# Patient Record
Sex: MALE | Race: WHITE | NOT HISPANIC OR LATINO | Employment: OTHER | URBAN - METROPOLITAN AREA
[De-identification: names, ages, dates, MRNs, and addresses within clinical notes are randomized per-mention and may not be internally consistent; named-entity substitution may affect disease eponyms.]

---

## 2019-05-17 DIAGNOSIS — Z00.00 HEALTH CARE MAINTENANCE: Primary | ICD-10-CM

## 2019-05-18 LAB
ALBUMIN SERPL-MCNC: 3.8 G/DL (ref 3.5–5.2)
ALBUMIN/GLOB SERPL: 1.6 G/DL
ALP SERPL-CCNC: 58 U/L (ref 39–117)
ALT SERPL-CCNC: 15 U/L (ref 1–41)
AST SERPL-CCNC: 17 U/L (ref 1–40)
BILIRUB SERPL-MCNC: 0.9 MG/DL (ref 0.2–1.2)
BUN SERPL-MCNC: 30 MG/DL (ref 8–23)
BUN/CREAT SERPL: 20 (ref 7–25)
CALCIUM SERPL-MCNC: 10.3 MG/DL (ref 8.6–10.5)
CHLORIDE SERPL-SCNC: 102 MMOL/L (ref 98–107)
CHOLEST SERPL-MCNC: 109 MG/DL (ref 0–200)
CHOLEST/HDLC SERPL: 2.95 {RATIO}
CO2 SERPL-SCNC: 27.8 MMOL/L (ref 22–29)
CREAT SERPL-MCNC: 1.5 MG/DL (ref 0.76–1.27)
GLOBULIN SER CALC-MCNC: 2.4 GM/DL
GLUCOSE SERPL-MCNC: 104 MG/DL (ref 65–99)
HDLC SERPL-MCNC: 37 MG/DL (ref 40–60)
LDLC SERPL CALC-MCNC: 53 MG/DL (ref 0–100)
POTASSIUM SERPL-SCNC: 4.4 MMOL/L (ref 3.5–5.2)
PROT SERPL-MCNC: 6.2 G/DL (ref 6–8.5)
SODIUM SERPL-SCNC: 139 MMOL/L (ref 136–145)
TRIGL SERPL-MCNC: 95 MG/DL (ref 0–150)
VLDLC SERPL CALC-MCNC: 19 MG/DL

## 2019-05-20 ENCOUNTER — OFFICE VISIT (OUTPATIENT)
Dept: INTERNAL MEDICINE | Facility: CLINIC | Age: 79
End: 2019-05-20

## 2019-05-20 VITALS
BODY MASS INDEX: 28.89 KG/M2 | HEIGHT: 70 IN | DIASTOLIC BLOOD PRESSURE: 72 MMHG | TEMPERATURE: 97.9 F | SYSTOLIC BLOOD PRESSURE: 112 MMHG | WEIGHT: 201.8 LBS | RESPIRATION RATE: 20 BRPM

## 2019-05-20 DIAGNOSIS — N28.9 RENAL INSUFFICIENCY: ICD-10-CM

## 2019-05-20 DIAGNOSIS — E34.9 INCREASED PTH LEVEL: ICD-10-CM

## 2019-05-20 DIAGNOSIS — E78.5 HYPERLIPIDEMIA, UNSPECIFIED HYPERLIPIDEMIA TYPE: ICD-10-CM

## 2019-05-20 DIAGNOSIS — G47.33 OSA ON CPAP: ICD-10-CM

## 2019-05-20 DIAGNOSIS — Z99.89 OSA ON CPAP: ICD-10-CM

## 2019-05-20 DIAGNOSIS — I10 ESSENTIAL HYPERTENSION: ICD-10-CM

## 2019-05-20 DIAGNOSIS — K21.9 GASTROESOPHAGEAL REFLUX DISEASE, ESOPHAGITIS PRESENCE NOT SPECIFIED: ICD-10-CM

## 2019-05-20 DIAGNOSIS — I25.10 CORONARY ARTERY DISEASE INVOLVING NATIVE CORONARY ARTERY OF NATIVE HEART WITHOUT ANGINA PECTORIS: Primary | ICD-10-CM

## 2019-05-20 PROBLEM — R79.89 INCREASED PTH LEVEL: Status: ACTIVE | Noted: 2019-05-20

## 2019-05-20 PROBLEM — I21.9 MYOCARDIAL INFARCTION (HCC): Status: ACTIVE | Noted: 2019-05-20

## 2019-05-20 PROCEDURE — 99214 OFFICE O/P EST MOD 30 MIN: CPT | Performed by: INTERNAL MEDICINE

## 2019-05-20 RX ORDER — OMEPRAZOLE 20 MG/1
20 CAPSULE, DELAYED RELEASE ORAL DAILY
COMMUNITY
Start: 2015-12-16 | End: 2021-01-29 | Stop reason: HOSPADM

## 2019-05-20 RX ORDER — ATENOLOL 25 MG/1
25 TABLET ORAL DAILY
COMMUNITY
Start: 2015-12-16 | End: 2020-06-09 | Stop reason: SDUPTHER

## 2019-05-20 RX ORDER — RANOLAZINE 1000 MG/1
1000 TABLET, EXTENDED RELEASE ORAL 2 TIMES DAILY
Refills: 0 | COMMUNITY
Start: 2019-03-30 | End: 2019-05-30 | Stop reason: SDUPTHER

## 2019-05-20 RX ORDER — ATORVASTATIN CALCIUM 80 MG/1
80 TABLET, FILM COATED ORAL DAILY
COMMUNITY
Start: 2019-05-17 | End: 2020-02-17

## 2019-05-20 RX ORDER — NITROGLYCERIN 0.4 MG/1
0.4 TABLET SUBLINGUAL AS NEEDED
Status: ON HOLD | COMMUNITY
Start: 2015-12-16 | End: 2021-05-12 | Stop reason: SDUPTHER

## 2019-05-20 RX ORDER — LOSARTAN POTASSIUM 50 MG/1
50 TABLET ORAL DAILY
COMMUNITY
End: 2020-06-09 | Stop reason: SDUPTHER

## 2019-05-20 RX ORDER — CHOLECALCIFEROL (VITAMIN D3) 125 MCG
2000 CAPSULE ORAL DAILY
COMMUNITY

## 2019-05-20 RX ORDER — CHLORAL HYDRATE 500 MG
CAPSULE ORAL
Status: ON HOLD | COMMUNITY
End: 2021-01-28

## 2019-05-20 RX ORDER — FUROSEMIDE 20 MG/1
20 TABLET ORAL DAILY
COMMUNITY
Start: 2015-12-16 | End: 2019-09-03 | Stop reason: SDUPTHER

## 2019-05-20 RX ORDER — ASPIRIN 81 MG/1
81 TABLET ORAL DAILY
COMMUNITY
Start: 2015-12-16

## 2019-05-20 RX ORDER — ISOSORBIDE MONONITRATE 30 MG/1
30 TABLET, EXTENDED RELEASE ORAL DAILY
COMMUNITY
Start: 2016-08-30 | End: 2019-08-26 | Stop reason: SDUPTHER

## 2019-05-20 NOTE — PROGRESS NOTES
Subjective        Chief Complaint   Patient presents with   • Follow-up     fup labs med refills   • Hypertension   • Hyperlipidemia       PHQ-2 Depression Screening  Little interest or pleasure in doing things? 0   Feeling down, depressed, or hopeless? 0   PHQ-2 Total Score 0         Kenn Brito is a 78 y.o. male who presents for    Patient Active Problem List   Diagnosis   • CAD (coronary artery disease)   • GERD (gastroesophageal reflux disease)   • Hx of CABG   • Hyperlipidemia   • Hypertension   • Renal insufficiency   • Increased PTH level   • EVELINA on CPAP       History of Present Illness     He was working on his camper and he twisted his left back some three days ago. He denies reflux, chest pain, dyspnea, nausea, abdominal pain, melena or hematochezia. He has not been checking his BP. He sees derm once per year. He started a CPAP machine this year; he is not snoring as much.  No Known Allergies    Current Outpatient Medications on File Prior to Visit   Medication Sig Dispense Refill   • aspirin 81 MG EC tablet Take 81 mg by mouth Daily.     • atenolol (TENORMIN) 25 MG tablet Take 25 mg by mouth Daily.     • atorvastatin (LIPITOR) 80 MG tablet Take 80 mg by mouth Daily.     • Cholecalciferol (VITAMIN D3) 2000 units tablet Take  by mouth.     • furosemide (LASIX) 20 MG tablet Take 20 mg by mouth Daily.     • isosorbide mononitrate (IMDUR) 30 MG 24 hr tablet Take 30 mg by mouth Daily.     • losartan (COZAAR) 50 MG tablet Take 50 mg by mouth Daily.     • Multiple Vitamin (MULTI-VITAMIN DAILY PO) Take  by mouth.     • nitroglycerin (NITROSTAT) 0.4 MG SL tablet Place 0.4 mg under the tongue As Needed.     • Omega-3 Fatty Acids (FISH OIL) 1000 MG capsule capsule Take  by mouth Daily With Breakfast.     • omeprazole (priLOSEC) 20 MG capsule Take 20 mg by mouth Daily.     • ranolazine (RANEXA) 1000 MG 12 hr tablet Take 1,000 mg by mouth 2 (Two) Times a Day.  0     No current facility-administered medications on  file prior to visit.        Past Medical History:   Diagnosis Date   • CAD (coronary artery disease)     stent in    • GERD (gastroesophageal reflux disease)    • Herpes zoster    • Hypercalcemia    • Hyperlipidemia    • Hypertension    • Increased PTH level    • Renal cyst    • Renal insufficiency    • Sciatica    • Tubular adenoma of colon 2014    X3       Past Surgical History:   Procedure Laterality Date   • CARDIAC CATHETERIZATION  2016   • COLONOSCOPY  10/29/2014   • CORONARY ARTERY BYPASS GRAFT         Family History   Problem Relation Age of Onset   • Throat cancer Father    • Peripheral vascular disease Brother    • Diabetes Brother    • Heart disease Brother    • Crohn's disease Daughter        Social History     Socioeconomic History   • Marital status:      Spouse name: Not on file   • Number of children: Not on file   • Years of education: Not on file   • Highest education level: Not on file   Tobacco Use   • Smoking status: Former Smoker     Packs/day: 0.50     Years: 20.00     Pack years: 10.00     Types: Cigarettes     Last attempt to quit: 1988     Years since quittin.4   • Smokeless tobacco: Never Used   Substance and Sexual Activity   • Alcohol use: Yes     Frequency: Monthly or less   • Drug use: No   • Sexual activity: Defer           The following portions of the patient's history were reviewed and updated as appropriate: problem list, allergies, current medications, past medical history, past family history, past social history and past surgical history.    Review of Systems   Respiratory: Negative for shortness of breath.    Cardiovascular: Negative for chest pain.       Immunization History   Administered Date(s) Administered   • Flu Vaccine High Dose PF 65YR+ 2018   • Pneumococcal Conjugate 13-Valent (PCV13) 2018   • Pneumococcal Polysaccharide (PPSV23) 2019   • Tdap 2018       Objective   Vitals:    19 1414   BP: 112/72   Resp: 20  "  Temp: 97.9 °F (36.6 °C)   TempSrc: Oral   Weight: 91.5 kg (201 lb 12.8 oz)   Height: 177.8 cm (70\")     Physical Exam   Constitutional: He appears well-developed and well-nourished.   HENT:   Head: Normocephalic and atraumatic.   Neck: Carotid bruit is not present.   Cardiovascular: Normal rate, regular rhythm, S1 normal, S2 normal and normal heart sounds.   Pulmonary/Chest: Effort normal and breath sounds normal.   Neurological: He is alert.   Skin: Skin is warm.   Psychiatric: He has a normal mood and affect.   Vitals reviewed.      Procedures    Assessment/Plan   Kenn was seen today for follow-up, hypertension and hyperlipidemia.    Diagnoses and all orders for this visit:    Coronary artery disease involving native coronary artery of native heart without angina pectoris    Hyperlipidemia, unspecified hyperlipidemia type    Essential hypertension    Gastroesophageal reflux disease, esophagitis presence not specified    Renal insufficiency  -     Basic Metabolic Panel; Future    Increased PTH level  -     PTH, Intact; Future    EVELINA on CPAP        BP is excellent. He saw cards recently. LDL is at goal. Cr is better; aware to avoid NSAIDS. He is using his CPAP now. He does not want to repeat a cscope.He saw cards in the last month. He is using his CPAP nightly now. Recc hep A at drug store. He is not having reflux with Prilosec.         Return in about 6 months (around 11/20/2019).  "

## 2019-05-30 RX ORDER — RANOLAZINE 1000 MG/1
TABLET, EXTENDED RELEASE ORAL
Qty: 180 TABLET | Refills: 0 | Status: SHIPPED | OUTPATIENT
Start: 2019-05-30 | End: 2019-10-29 | Stop reason: SDUPTHER

## 2019-08-23 ENCOUNTER — OFFICE VISIT (OUTPATIENT)
Dept: INTERNAL MEDICINE | Facility: CLINIC | Age: 79
End: 2019-08-23

## 2019-08-23 VITALS
HEIGHT: 71 IN | DIASTOLIC BLOOD PRESSURE: 76 MMHG | WEIGHT: 203.8 LBS | SYSTOLIC BLOOD PRESSURE: 136 MMHG | BODY MASS INDEX: 28.53 KG/M2

## 2019-08-23 DIAGNOSIS — M79.89 SWELLING OF LEFT FOOT: Primary | ICD-10-CM

## 2019-08-23 PROCEDURE — 99212 OFFICE O/P EST SF 10 MIN: CPT | Performed by: INTERNAL MEDICINE

## 2019-08-23 NOTE — PROGRESS NOTES
Subjective        Chief Complaint   Patient presents with   • Foot Injury     lt foot pain           Kenn Brito is a 78 y.o. male who presents for    Patient Active Problem List   Diagnosis   • CAD (coronary artery disease)   • GERD (gastroesophageal reflux disease)   • Hx of CABG   • Hyperlipidemia   • Hypertension   • Renal insufficiency   • Increased PTH level   • EVELINA on CPAP   • Swelling of left foot       History of Present Illness     He noticed some swelling over the lateral aspect of his left foot for two days; it has gotten better. He denies pain in the foot of chest pain. He did not injure. He has no calf pain or swelling.  No Known Allergies    Current Outpatient Medications on File Prior to Visit   Medication Sig Dispense Refill   • aspirin 81 MG EC tablet Take 81 mg by mouth Daily.     • atenolol (TENORMIN) 25 MG tablet Take 25 mg by mouth Daily.     • atorvastatin (LIPITOR) 80 MG tablet Take 80 mg by mouth Daily.     • Cholecalciferol (VITAMIN D3) 2000 units tablet Take  by mouth.     • furosemide (LASIX) 20 MG tablet Take 20 mg by mouth Daily.     • isosorbide mononitrate (IMDUR) 30 MG 24 hr tablet Take 30 mg by mouth Daily.     • losartan (COZAAR) 50 MG tablet Take 50 mg by mouth Daily.     • Multiple Vitamin (MULTI-VITAMIN DAILY PO) Take  by mouth.     • nitroglycerin (NITROSTAT) 0.4 MG SL tablet Place 0.4 mg under the tongue As Needed.     • Omega-3 Fatty Acids (FISH OIL) 1000 MG capsule capsule Take  by mouth Daily With Breakfast.     • omeprazole (priLOSEC) 20 MG capsule Take 20 mg by mouth Daily.     • ranolazine (RANEXA) 1000 MG 12 hr tablet TAKE 1 TABLET BY MOUTH EVERY 12 HOURS 180 tablet 0     No current facility-administered medications on file prior to visit.        Past Medical History:   Diagnosis Date   • 3-vessel CAD    • CAD (coronary artery disease)     stent in 2012   • GERD (gastroesophageal reflux disease)    • Herpes zoster    • Hypercalcemia    • Hyperlipidemia    •  "Hypertension    • Increased PTH level    • Renal cyst    • Renal insufficiency    • Sciatica    • Tubular adenoma of colon 2014    X3       Past Surgical History:   Procedure Laterality Date   • CARDIAC CATHETERIZATION  2016   • COLONOSCOPY  10/29/2014   • CORONARY ARTERY BYPASS GRAFT         Family History   Problem Relation Age of Onset   • Throat cancer Father    • Peripheral vascular disease Brother    • Diabetes Brother    • Heart disease Brother    • Crohn's disease Daughter        Social History     Socioeconomic History   • Marital status:      Spouse name: Not on file   • Number of children: Not on file   • Years of education: Not on file   • Highest education level: Not on file   Tobacco Use   • Smoking status: Former Smoker     Packs/day: 0.50     Years: 20.00     Pack years: 10.00     Types: Cigarettes     Last attempt to quit: 1988     Years since quittin.6   • Smokeless tobacco: Never Used   Substance and Sexual Activity   • Alcohol use: Yes     Frequency: Monthly or less   • Drug use: No   • Sexual activity: Defer           The following portions of the patient's history were reviewed and updated as appropriate: problem list, allergies, current medications, past medical history, past family history, past social history and past surgical history.    Review of Systems   Cardiovascular: Negative for chest pain.   Musculoskeletal:        Swelling on top of his left foot.       Immunization History   Administered Date(s) Administered   • Flu Vaccine High Dose PF 65YR+ 2018   • Pneumococcal Conjugate 13-Valent (PCV13) 2018   • Pneumococcal Polysaccharide (PPSV23) 2019   • Tdap 2018       Objective   Vitals:    19 1440   BP: 136/76   Weight: 92.4 kg (203 lb 12.8 oz)   Height: 180.3 cm (71\")     Physical Exam   Constitutional: He appears well-developed and well-nourished.   Musculoskeletal:   Top left foot is not swollen or tender       Vitals " reviewed.      Procedures    Assessment/Plan   Kenn was seen today for foot injury.    Diagnoses and all orders for this visit:    Swelling of left foot             The swelling has resolved. To call if reoccurs.    No Follow-up on file.

## 2019-08-26 RX ORDER — ISOSORBIDE MONONITRATE 30 MG/1
TABLET, EXTENDED RELEASE ORAL
Qty: 90 TABLET | Refills: 0 | Status: SHIPPED | OUTPATIENT
Start: 2019-08-26 | End: 2019-11-22 | Stop reason: SDUPTHER

## 2019-09-03 RX ORDER — FUROSEMIDE 20 MG/1
20 TABLET ORAL DAILY
Qty: 90 TABLET | Refills: 1 | Status: SHIPPED | OUTPATIENT
Start: 2019-09-03 | End: 2020-03-04

## 2019-10-29 RX ORDER — RANOLAZINE 1000 MG/1
TABLET, EXTENDED RELEASE ORAL
Qty: 180 TABLET | Refills: 1 | Status: SHIPPED | OUTPATIENT
Start: 2019-10-29 | End: 2020-04-29

## 2019-11-19 ENCOUNTER — RESULTS ENCOUNTER (OUTPATIENT)
Dept: INTERNAL MEDICINE | Facility: CLINIC | Age: 79
End: 2019-11-19

## 2019-11-19 DIAGNOSIS — E34.9 INCREASED PTH LEVEL: ICD-10-CM

## 2019-11-19 DIAGNOSIS — N28.9 RENAL INSUFFICIENCY: ICD-10-CM

## 2019-11-21 ENCOUNTER — TELEPHONE (OUTPATIENT)
Dept: INTERNAL MEDICINE | Facility: CLINIC | Age: 79
End: 2019-11-21

## 2019-11-21 ENCOUNTER — OFFICE VISIT (OUTPATIENT)
Dept: INTERNAL MEDICINE | Facility: CLINIC | Age: 79
End: 2019-11-21

## 2019-11-21 VITALS
HEIGHT: 70 IN | DIASTOLIC BLOOD PRESSURE: 78 MMHG | SYSTOLIC BLOOD PRESSURE: 124 MMHG | BODY MASS INDEX: 29.2 KG/M2 | WEIGHT: 204 LBS

## 2019-11-21 DIAGNOSIS — N28.9 RENAL INSUFFICIENCY: ICD-10-CM

## 2019-11-21 DIAGNOSIS — Z12.5 PROSTATE CANCER SCREENING: ICD-10-CM

## 2019-11-21 DIAGNOSIS — G47.33 OSA ON CPAP: ICD-10-CM

## 2019-11-21 DIAGNOSIS — E34.9 INCREASED PTH LEVEL: ICD-10-CM

## 2019-11-21 DIAGNOSIS — R91.8 MULTIPLE LUNG NODULES ON CT: ICD-10-CM

## 2019-11-21 DIAGNOSIS — I10 ESSENTIAL HYPERTENSION: Primary | ICD-10-CM

## 2019-11-21 DIAGNOSIS — Z99.89 OSA ON CPAP: ICD-10-CM

## 2019-11-21 PROCEDURE — 99214 OFFICE O/P EST MOD 30 MIN: CPT | Performed by: INTERNAL MEDICINE

## 2019-11-21 NOTE — PROGRESS NOTES
Subjective        Chief Complaint   Patient presents with   • Follow-up     fup abn cxr lung nodule med eval refills    • Med Refill   • Hypertension   • Hyperlipidemia           Kenn Brito is a 78 y.o. male who presents for    Patient Active Problem List   Diagnosis   • CAD (coronary artery disease)   • GERD (gastroesophageal reflux disease)   • Hx of CABG   • Hyperlipidemia   • Hypertension   • Renal insufficiency   • Increased PTH level   • EVELINA on CPAP   • Multiple lung nodules on CT       History of Present Illness     He was going to have his right knee replaced. He had a pre op CXR and a CT that showed a lung nodule about a month ago. He was told to f/u with me. He does not have a regular cough. He has dyspnea with steps that is not new. He sees his cardiologist once per year. He uses a CPAP at night. He has not checked his BP. He denies hematuria or dysuria. He has no reflux. He has been off of tobacco for 31 years. He has no complications with surgery in the past.  No Known Allergies    Current Outpatient Medications on File Prior to Visit   Medication Sig Dispense Refill   • aspirin 81 MG EC tablet Take 81 mg by mouth Daily.     • atenolol (TENORMIN) 25 MG tablet Take 25 mg by mouth Daily.     • atorvastatin (LIPITOR) 80 MG tablet Take 80 mg by mouth Daily.     • Cholecalciferol (VITAMIN D3) 2000 units tablet Take  by mouth.     • furosemide (LASIX) 20 MG tablet Take 1 tablet by mouth Daily. 90 tablet 1   • isosorbide mononitrate (IMDUR) 30 MG 24 hr tablet TAKE 1 TABLET BY MOUTH DAILY 90 tablet 0   • losartan (COZAAR) 50 MG tablet Take 50 mg by mouth Daily.     • Multiple Vitamin (MULTI-VITAMIN DAILY PO) Take  by mouth.     • nitroglycerin (NITROSTAT) 0.4 MG SL tablet Place 0.4 mg under the tongue As Needed.     • Omega-3 Fatty Acids (FISH OIL) 1000 MG capsule capsule Take  by mouth Daily With Breakfast.     • omeprazole (priLOSEC) 20 MG capsule Take 20 mg by mouth Daily.     • ranolazine (RANEXA) 1000  MG 12 hr tablet TAKE 1 TABLET BY MOUTH EVERY 12 HOURS 180 tablet 1     No current facility-administered medications on file prior to visit.        Past Medical History:   Diagnosis Date   • 3-vessel CAD    • CAD (coronary artery disease)     stent in    • GERD (gastroesophageal reflux disease)    • Herpes zoster    • Hypercalcemia    • Hyperlipidemia    • Hypertension    • Increased PTH level    • Renal cyst    • Renal insufficiency    • Sciatica    • Tubular adenoma of colon 2014    X3       Past Surgical History:   Procedure Laterality Date   • CARDIAC CATHETERIZATION  2016   • COLONOSCOPY  10/29/2014   • CORONARY ARTERY BYPASS GRAFT         Family History   Problem Relation Age of Onset   • Throat cancer Father    • Peripheral vascular disease Brother    • Diabetes Brother    • Heart disease Brother    • Crohn's disease Daughter        Social History     Socioeconomic History   • Marital status:      Spouse name: Not on file   • Number of children: Not on file   • Years of education: Not on file   • Highest education level: Not on file   Tobacco Use   • Smoking status: Former Smoker     Packs/day: 0.50     Years: 20.00     Pack years: 10.00     Types: Cigarettes     Last attempt to quit: 1988     Years since quittin.9   • Smokeless tobacco: Never Used   Substance and Sexual Activity   • Alcohol use: Yes     Frequency: Monthly or less   • Drug use: No   • Sexual activity: Defer           The following portions of the patient's history were reviewed and updated as appropriate: problem list, allergies, current medications, past medical history, past family history, past social history and past surgical history.    Review of Systems   Respiratory: Positive for cough.    Cardiovascular: Negative for chest pain.       Immunization History   Administered Date(s) Administered   • Fluzone High Dose =>65 Years ([employer] ONLY) 2018   • Pneumococcal Conjugate 13-Valent (PCV13) 2018   •  "Pneumococcal Polysaccharide (PPSV23) 02/18/2019   • Tdap 09/18/2018       Objective   Vitals:    11/21/19 0754   BP: 124/78   Weight: 92.5 kg (204 lb)   Height: 177.8 cm (70\")     Body mass index is 29.27 kg/m².  Physical Exam   Constitutional: He appears well-developed and well-nourished.   HENT:   Head: Normocephalic and atraumatic.   Neck: Carotid bruit is not present.   Cardiovascular: Normal rate, regular rhythm, S1 normal, S2 normal and normal heart sounds.   Pulmonary/Chest: Effort normal and breath sounds normal.   Neurological: He is alert.   Skin: Skin is warm.   Psychiatric: He has a normal mood and affect.   Vitals reviewed.      Procedures    Assessment/Plan   Kenn was seen today for follow-up, med refill, hypertension and hyperlipidemia.    Diagnoses and all orders for this visit:    Essential hypertension  -     Lipid Panel With / Chol / HDL Ratio; Future    EVELINA on CPAP    Renal insufficiency  -     Comprehensive Metabolic Panel; Future    Multiple lung nodules on CT    Increased PTH level    Prostate cancer screening  -     PSA Screen; Future             Labs today including PTH and bmp. Recc flu shot at drug store. BP is good. I see no contraindications for knee replacement. He will get preop cardiac clearance from Dr. Dykes. I will repeat his chest CT in one year as per the report there are no worrisome nodules. I will talk with Dr. Mueller. He uses his CPAP regularly.    I spoke with Dr. Bryn Mueller today and we are both okay with his having surgery. His chest CT shows no evidence of any worrisome lesions that would preclude joint replacement.  Return in about 6 months (around 5/21/2020).  "

## 2019-11-21 NOTE — TELEPHONE ENCOUNTER
Let his wife know that I spoke with Dr. Mueller and they are going to call to set up his knee replacment

## 2019-11-22 LAB
BUN SERPL-MCNC: 33 MG/DL (ref 8–23)
BUN/CREAT SERPL: 21.6 (ref 7–25)
CALCIUM SERPL-MCNC: 9.9 MG/DL (ref 8.6–10.5)
CHLORIDE SERPL-SCNC: 103 MMOL/L (ref 98–107)
CO2 SERPL-SCNC: 28.4 MMOL/L (ref 22–29)
CREAT SERPL-MCNC: 1.53 MG/DL (ref 0.76–1.27)
GLUCOSE SERPL-MCNC: 99 MG/DL (ref 65–99)
POTASSIUM SERPL-SCNC: 4.5 MMOL/L (ref 3.5–5.2)
PTH-INTACT SERPL-MCNC: 49 PG/ML (ref 15–65)
SODIUM SERPL-SCNC: 142 MMOL/L (ref 136–145)

## 2019-11-22 RX ORDER — ISOSORBIDE MONONITRATE 30 MG/1
TABLET, EXTENDED RELEASE ORAL
Qty: 90 TABLET | Refills: 0 | Status: SHIPPED | OUTPATIENT
Start: 2019-11-22 | End: 2020-02-18

## 2020-02-17 RX ORDER — ATORVASTATIN CALCIUM 80 MG/1
TABLET, FILM COATED ORAL
Qty: 90 TABLET | Refills: 1 | Status: SHIPPED | OUTPATIENT
Start: 2020-02-17 | End: 2020-08-20

## 2020-02-18 RX ORDER — ISOSORBIDE MONONITRATE 30 MG/1
TABLET, EXTENDED RELEASE ORAL
Qty: 90 TABLET | Refills: 0 | Status: SHIPPED | OUTPATIENT
Start: 2020-02-18 | End: 2020-05-26

## 2020-03-04 RX ORDER — FUROSEMIDE 20 MG/1
20 TABLET ORAL DAILY
Qty: 90 TABLET | Refills: 1 | Status: SHIPPED | OUTPATIENT
Start: 2020-03-04 | End: 2020-09-16 | Stop reason: SDUPTHER

## 2020-04-29 DIAGNOSIS — I51.9 HEART DISEASE: Primary | ICD-10-CM

## 2020-04-29 RX ORDER — RANOLAZINE 1000 MG/1
TABLET, EXTENDED RELEASE ORAL
Qty: 180 TABLET | Refills: 1 | Status: SHIPPED | OUTPATIENT
Start: 2020-04-29 | End: 2020-11-09

## 2020-05-14 DIAGNOSIS — N28.9 RENAL INSUFFICIENCY: ICD-10-CM

## 2020-05-14 DIAGNOSIS — I10 ESSENTIAL HYPERTENSION: ICD-10-CM

## 2020-05-14 DIAGNOSIS — Z12.5 PROSTATE CANCER SCREENING: ICD-10-CM

## 2020-05-16 LAB
ALBUMIN SERPL-MCNC: 4.1 G/DL (ref 3.5–5.2)
ALBUMIN/GLOB SERPL: 2 G/DL
ALP SERPL-CCNC: 60 U/L (ref 39–117)
ALT SERPL-CCNC: 17 U/L (ref 1–41)
AST SERPL-CCNC: 16 U/L (ref 1–40)
BILIRUB SERPL-MCNC: 0.8 MG/DL (ref 0.2–1.2)
BUN SERPL-MCNC: 32 MG/DL (ref 8–23)
BUN/CREAT SERPL: 20.5 (ref 7–25)
CALCIUM SERPL-MCNC: 10 MG/DL (ref 8.6–10.5)
CHLORIDE SERPL-SCNC: 104 MMOL/L (ref 98–107)
CHOLEST SERPL-MCNC: 111 MG/DL (ref 0–200)
CHOLEST/HDLC SERPL: 3.08 {RATIO}
CO2 SERPL-SCNC: 27.1 MMOL/L (ref 22–29)
CREAT SERPL-MCNC: 1.56 MG/DL (ref 0.76–1.27)
GLOBULIN SER CALC-MCNC: 2.1 GM/DL
GLUCOSE SERPL-MCNC: 101 MG/DL (ref 65–99)
HDLC SERPL-MCNC: 36 MG/DL (ref 40–60)
LDLC SERPL CALC-MCNC: 54 MG/DL (ref 0–100)
POTASSIUM SERPL-SCNC: 4.5 MMOL/L (ref 3.5–5.2)
PROT SERPL-MCNC: 6.2 G/DL (ref 6–8.5)
PSA SERPL-MCNC: 0.69 NG/ML (ref 0–4)
SODIUM SERPL-SCNC: 142 MMOL/L (ref 136–145)
TRIGL SERPL-MCNC: 107 MG/DL (ref 0–150)
VLDLC SERPL CALC-MCNC: 21.4 MG/DL

## 2020-05-21 ENCOUNTER — OFFICE VISIT (OUTPATIENT)
Dept: INTERNAL MEDICINE | Facility: CLINIC | Age: 80
End: 2020-05-21

## 2020-05-21 VITALS
HEART RATE: 54 BPM | TEMPERATURE: 98 F | BODY MASS INDEX: 29.78 KG/M2 | HEIGHT: 70 IN | WEIGHT: 208 LBS | SYSTOLIC BLOOD PRESSURE: 110 MMHG | DIASTOLIC BLOOD PRESSURE: 78 MMHG

## 2020-05-21 DIAGNOSIS — R91.8 MULTIPLE LUNG NODULES ON CT: ICD-10-CM

## 2020-05-21 DIAGNOSIS — N28.9 RENAL INSUFFICIENCY: ICD-10-CM

## 2020-05-21 DIAGNOSIS — Z99.89 OSA ON CPAP: ICD-10-CM

## 2020-05-21 DIAGNOSIS — G47.33 OSA ON CPAP: ICD-10-CM

## 2020-05-21 DIAGNOSIS — E78.49 OTHER HYPERLIPIDEMIA: ICD-10-CM

## 2020-05-21 DIAGNOSIS — I10 ESSENTIAL HYPERTENSION: Primary | ICD-10-CM

## 2020-05-21 PROCEDURE — 99214 OFFICE O/P EST MOD 30 MIN: CPT | Performed by: INTERNAL MEDICINE

## 2020-05-21 PROCEDURE — 93000 ELECTROCARDIOGRAM COMPLETE: CPT | Performed by: INTERNAL MEDICINE

## 2020-05-21 NOTE — PROGRESS NOTES
Subjective        Chief Complaint   Patient presents with   • Hypertension     follow up           Kenn Brito is a 79 y.o. male who presents for    Patient Active Problem List   Diagnosis   • CAD (coronary artery disease)   • GERD (gastroesophageal reflux disease)   • Hx of CABG   • Hyperlipidemia   • Hypertension   • Renal insufficiency   • Increased PTH level   • EVELINA on CPAP   • Multiple lung nodules on CT       History of Present Illness     He has not been checking his BP. He denies syncope, presyncope, chest pain, dyspnea, nausea or abdominal pain. He walks a little. He uses a CPAP machine at night for over a year. He is not having reflux. He has not needed SL nitro.   No Known Allergies  Advance Care Planning   ACP discussion was held with the patient during this visit. Patient has an advance directive (not in EMR), copy requested.    Current Outpatient Medications on File Prior to Visit   Medication Sig Dispense Refill   • aspirin 81 MG EC tablet Take 81 mg by mouth Daily.     • atenolol (TENORMIN) 25 MG tablet Take 25 mg by mouth Daily.     • atorvastatin (LIPITOR) 80 MG tablet TAKE 1 TABLET BY MOUTH EVERY DAY 90 tablet 1   • Cholecalciferol (VITAMIN D3) 2000 units tablet Take  by mouth.     • furosemide (LASIX) 20 MG tablet TAKE 1 TABLET BY MOUTH DAILY 90 tablet 1   • isosorbide mononitrate (IMDUR) 30 MG 24 hr tablet TAKE 1 TABLET BY MOUTH DAILY 90 tablet 0   • losartan (COZAAR) 50 MG tablet Take 50 mg by mouth Daily.     • Multiple Vitamin (MULTI-VITAMIN DAILY PO) Take  by mouth.     • nitroglycerin (NITROSTAT) 0.4 MG SL tablet Place 0.4 mg under the tongue As Needed.     • Omega-3 Fatty Acids (FISH OIL) 1000 MG capsule capsule Take  by mouth Daily With Breakfast.     • omeprazole (priLOSEC) 20 MG capsule Take 20 mg by mouth Daily.     • ranolazine (RANEXA) 1000 MG 12 hr tablet TAKE 1 TABLET BY MOUTH EVERY 12 HOURS 180 tablet 1     No current facility-administered medications on file prior to visit.         Past Medical History:   Diagnosis Date   • 3-vessel CAD    • CAD (coronary artery disease)     stent in    • GERD (gastroesophageal reflux disease)    • Herpes zoster    • Hypercalcemia    • Hyperlipidemia    • Hypertension    • Increased PTH level    • Renal cyst    • Renal insufficiency    • Sciatica    • Tubular adenoma of colon 2014    X3       Past Surgical History:   Procedure Laterality Date   • CARDIAC CATHETERIZATION  2016   • COLONOSCOPY  10/29/2014   • CORONARY ARTERY BYPASS GRAFT     • REPLACEMENT TOTAL KNEE Right 2019       Family History   Problem Relation Age of Onset   • Throat cancer Father    • Peripheral vascular disease Brother    • Diabetes Brother    • Heart disease Brother    • Crohn's disease Daughter        Social History     Socioeconomic History   • Marital status:      Spouse name: Not on file   • Number of children: Not on file   • Years of education: Not on file   • Highest education level: Not on file   Tobacco Use   • Smoking status: Former Smoker     Packs/day: 0.50     Years: 20.00     Pack years: 10.00     Types: Cigarettes     Last attempt to quit: 1988     Years since quittin.4   • Smokeless tobacco: Never Used   Substance and Sexual Activity   • Alcohol use: Yes     Frequency: Monthly or less   • Drug use: No   • Sexual activity: Defer           The following portions of the patient's history were reviewed and updated as appropriate: problem list, allergies, current medications, past medical history, past family history, past social history and past surgical history.    Review of Systems   Respiratory: Negative for shortness of breath.    Cardiovascular: Negative for chest pain.       Immunization History   Administered Date(s) Administered   • Fluzone High Dose =>65 Years ([employer] ONLY) 2018   • Pneumococcal Conjugate 13-Valent (PCV13) 2018   • Pneumococcal Polysaccharide (PPSV23) 2019   • Tdap 2018       Objective  "  Vitals:    05/21/20 0805   BP: 110/78   Pulse: 54   Temp: 98 °F (36.7 °C)   Weight: 94.3 kg (208 lb)   Height: 177.8 cm (70\")     Body mass index is 29.84 kg/m².  Physical Exam   Constitutional: He appears well-developed and well-nourished.   HENT:   Head: Normocephalic and atraumatic.   Neck: Carotid bruit is not present.   Cardiovascular: Regular rhythm, S1 normal, S2 normal and normal heart sounds. Bradycardia present.   Pulmonary/Chest: Effort normal and breath sounds normal.   Neurological: He is alert.   Skin: Skin is warm.   Psychiatric: He has a normal mood and affect.   Vitals reviewed.        ECG 12 Lead  Date/Time: 5/21/2020 8:42 AM  Performed by: Dante Tabor MD  Authorized by: Dante Tabor MD   Comparison: compared with previous ECG from 2/13/2018  Rhythm: sinus bradycardia  Rate: bradycardic  Conduction: right bundle branch block    Clinical impression: abnormal EKG  Comments: EKG is similar to past            Assessment/Plan   Kenn was seen today for hypertension.    Diagnoses and all orders for this visit:    Essential hypertension    EVELINA on CPAP  Comments:  Uses CPAP machine    Multiple lung nodules on CT  Comments:  Order one year follow up CT next time  Orders:  -     CT Chest Without Contrast; Future    Renal insufficiency  Comments:  Cr is stable  Orders:  -     Basic Metabolic Panel; Future    Other hyperlipidemia  Comments:  LDL is at goal. He sees cards in July             EKG is similar to past. He has no syncopal symptoms. He sees cards in July.    Return for Medicare Wellness.  "

## 2020-05-26 RX ORDER — ISOSORBIDE MONONITRATE 30 MG/1
TABLET, EXTENDED RELEASE ORAL
Qty: 90 TABLET | Refills: 0 | Status: SHIPPED | OUTPATIENT
Start: 2020-05-26 | End: 2020-08-24

## 2020-06-09 DIAGNOSIS — I10 ESSENTIAL HYPERTENSION: Primary | ICD-10-CM

## 2020-06-09 RX ORDER — LOSARTAN POTASSIUM 50 MG/1
50 TABLET ORAL DAILY
Qty: 90 TABLET | Refills: 3 | Status: SHIPPED | OUTPATIENT
Start: 2020-06-09 | End: 2021-06-08

## 2020-06-09 RX ORDER — ATENOLOL 25 MG/1
25 TABLET ORAL DAILY
Qty: 90 TABLET | Refills: 3 | Status: SHIPPED | OUTPATIENT
Start: 2020-06-09 | End: 2021-03-04

## 2020-08-20 RX ORDER — ATORVASTATIN CALCIUM 80 MG/1
TABLET, FILM COATED ORAL
Qty: 90 TABLET | Refills: 1 | Status: SHIPPED | OUTPATIENT
Start: 2020-08-20 | End: 2021-01-29 | Stop reason: HOSPADM

## 2020-08-24 RX ORDER — ISOSORBIDE MONONITRATE 30 MG/1
TABLET, EXTENDED RELEASE ORAL
Qty: 90 TABLET | Refills: 3 | Status: SHIPPED | OUTPATIENT
Start: 2020-08-24 | End: 2021-08-17

## 2020-09-10 ENCOUNTER — CLINICAL SUPPORT (OUTPATIENT)
Dept: INTERNAL MEDICINE | Facility: CLINIC | Age: 80
End: 2020-09-10

## 2020-09-10 DIAGNOSIS — Z23 NEED FOR INFLUENZA VACCINATION: Primary | ICD-10-CM

## 2020-09-10 PROCEDURE — G0008 ADMIN INFLUENZA VIRUS VAC: HCPCS | Performed by: INTERNAL MEDICINE

## 2020-09-10 PROCEDURE — 90694 VACC AIIV4 NO PRSRV 0.5ML IM: CPT | Performed by: INTERNAL MEDICINE

## 2020-09-16 DIAGNOSIS — I10 ESSENTIAL HYPERTENSION: Primary | ICD-10-CM

## 2020-09-16 RX ORDER — FUROSEMIDE 20 MG/1
20 TABLET ORAL DAILY
Qty: 90 TABLET | Refills: 1 | Status: SHIPPED | OUTPATIENT
Start: 2020-09-16 | End: 2021-03-08

## 2020-11-08 DIAGNOSIS — I51.9 HEART DISEASE: ICD-10-CM

## 2020-11-09 RX ORDER — RANOLAZINE 1000 MG/1
TABLET, EXTENDED RELEASE ORAL
Qty: 180 TABLET | Refills: 1 | Status: SHIPPED | OUTPATIENT
Start: 2020-11-09 | End: 2021-05-04

## 2020-11-17 LAB
BUN SERPL-MCNC: 36 MG/DL (ref 8–23)
BUN/CREAT SERPL: 24.2 (ref 7–25)
CALCIUM SERPL-MCNC: 9.9 MG/DL (ref 8.6–10.5)
CHLORIDE SERPL-SCNC: 102 MMOL/L (ref 98–107)
CO2 SERPL-SCNC: 28 MMOL/L (ref 22–29)
CREAT SERPL-MCNC: 1.49 MG/DL (ref 0.76–1.27)
GLUCOSE SERPL-MCNC: 99 MG/DL (ref 65–99)
POTASSIUM SERPL-SCNC: 5.1 MMOL/L (ref 3.5–5.2)
SODIUM SERPL-SCNC: 139 MMOL/L (ref 136–145)

## 2020-11-19 ENCOUNTER — APPOINTMENT (OUTPATIENT)
Dept: OTHER | Facility: HOSPITAL | Age: 80
End: 2020-11-19

## 2020-11-19 ENCOUNTER — HOSPITAL ENCOUNTER (OUTPATIENT)
Dept: CT IMAGING | Facility: HOSPITAL | Age: 80
Discharge: HOME | End: 2020-11-19

## 2020-11-19 ENCOUNTER — RESULTS ENCOUNTER (OUTPATIENT)
Dept: INTERNAL MEDICINE | Facility: CLINIC | Age: 80
End: 2020-11-19

## 2020-11-19 DIAGNOSIS — Z09 FOLLOW UP: ICD-10-CM

## 2020-11-19 DIAGNOSIS — R91.8 MULTIPLE LUNG NODULES ON CT: ICD-10-CM

## 2020-11-19 DIAGNOSIS — N28.9 RENAL INSUFFICIENCY: ICD-10-CM

## 2020-11-19 PROCEDURE — 71250 CT THORAX DX C-: CPT

## 2020-11-20 NOTE — PROGRESS NOTES
I am having radiology get USN from 2/17 [facility] for comparison

## 2020-11-23 ENCOUNTER — OFFICE VISIT (OUTPATIENT)
Dept: INTERNAL MEDICINE | Facility: CLINIC | Age: 80
End: 2020-11-23

## 2020-11-23 VITALS
TEMPERATURE: 97 F | BODY MASS INDEX: 29.49 KG/M2 | HEART RATE: 59 BPM | DIASTOLIC BLOOD PRESSURE: 76 MMHG | SYSTOLIC BLOOD PRESSURE: 116 MMHG | OXYGEN SATURATION: 98 % | WEIGHT: 206 LBS | HEIGHT: 70 IN

## 2020-11-23 DIAGNOSIS — R06.09 DYSPNEA ON EXERTION: ICD-10-CM

## 2020-11-23 DIAGNOSIS — Z00.00 MEDICARE ANNUAL WELLNESS VISIT, SUBSEQUENT: Primary | ICD-10-CM

## 2020-11-23 DIAGNOSIS — I10 ESSENTIAL HYPERTENSION: ICD-10-CM

## 2020-11-23 DIAGNOSIS — N28.9 RENAL INSUFFICIENCY: ICD-10-CM

## 2020-11-23 DIAGNOSIS — R91.8 MULTIPLE LUNG NODULES ON CT: ICD-10-CM

## 2020-11-23 DIAGNOSIS — I25.10 CORONARY ARTERY DISEASE INVOLVING NATIVE CORONARY ARTERY OF NATIVE HEART WITHOUT ANGINA PECTORIS: ICD-10-CM

## 2020-11-23 DIAGNOSIS — I49.3 PVC (PREMATURE VENTRICULAR CONTRACTION): ICD-10-CM

## 2020-11-23 DIAGNOSIS — Z11.59 NEED FOR HEPATITIS C SCREENING TEST: ICD-10-CM

## 2020-11-23 DIAGNOSIS — Z12.5 SCREENING FOR PROSTATE CANCER: ICD-10-CM

## 2020-11-23 PROBLEM — E78.49 OTHER HYPERLIPIDEMIA: Status: ACTIVE | Noted: 2019-05-20

## 2020-11-23 PROCEDURE — 99213 OFFICE O/P EST LOW 20 MIN: CPT | Performed by: INTERNAL MEDICINE

## 2020-11-23 PROCEDURE — 93000 ELECTROCARDIOGRAM COMPLETE: CPT | Performed by: INTERNAL MEDICINE

## 2020-11-23 PROCEDURE — G0439 PPPS, SUBSEQ VISIT: HCPCS | Performed by: INTERNAL MEDICINE

## 2020-11-23 NOTE — PROGRESS NOTES
The ABCs of the Annual Wellness Visit  Subsequent Medicare Wellness Visit    Chief Complaint   Patient presents with   • Annual Exam   • Hypertension       Subjective   History of Present Illness:  Kenn Brito is a 79 y.o. male who presents for a Subsequent Medicare Wellness Visit.  He denies chest pain. He was short of breath for 3-4 hours yesterday. He was working on his camper yesterday. He denies chest pain. He has no edema. In , he had elbow pain and chest pain before his CABG.   HEALTH RISK ASSESSMENT    Recent Hospitalizations:  No hospitalization(s) within the last year.    Current Medical Providers:  Patient Care Team:  Dante Tabor MD as PCP - General (Internal Medicine)    Smoking Status:  Social History     Tobacco Use   Smoking Status Former Smoker   • Packs/day: 0.50   • Years: 20.00   • Pack years: 10.00   • Types: Cigarettes   • Quit date: 1988   • Years since quittin.9   Smokeless Tobacco Never Used       Alcohol Consumption:  Social History     Substance and Sexual Activity   Alcohol Use Yes   • Frequency: Monthly or less       Depression Screen:   PHQ-2/PHQ-9 Depression Screening 2020   Little interest or pleasure in doing things 0   Feeling down, depressed, or hopeless 0   Total Score 0       Fall Risk Screen:  STEADI Fall Risk Assessment was completed, and patient is at LOW risk for falls.Assessment completed on:2020    Health Habits and Functional and Cognitive Screening:  Functional & Cognitive Status 2020   Do you have difficulty preparing food and eating? No   Do you have difficulty bathing yourself, getting dressed or grooming yourself? No   Do you have difficulty using the toilet? No   Do you have difficulty moving around from place to place? No   Do you have trouble with steps or getting out of a bed or a chair? No   Current Diet Well Balanced Diet   Dental Exam Up to date   Eye Exam Up to date   Exercise (times per week) 0 times per week    Current Exercise Activities Include None   Do you need help using the phone?  No   Are you deaf or do you have serious difficulty hearing?  No   Do you need help with transportation? No   Do you need help shopping? No   Do you need help preparing meals?  No   Do you need help with housework?  No   Do you need help with laundry? No   Do you need help taking your medications? No   Do you need help managing money? No   Do you ever drive or ride in a car without wearing a seat belt? No   Have you felt unusual stress, anger or loneliness in the last month? No   Who do you live with? Spouse   If you need help, do you have trouble finding someone available to you? No   Have you been bothered in the last four weeks by sexual problems? No   Do you have difficulty concentrating, remembering or making decisions? No         Does the patient have evidence of cognitive impairment? No    Asprin use counseling:Taking ASA appropriately as indicated    Age-appropriate Screening Schedule:  Refer to the list below for future screening recommendations based on patient's age, sex and/or medical conditions. Orders for these recommended tests are listed in the plan section. The patient has been provided with a written plan.    Health Maintenance   Topic Date Due   • ZOSTER VACCINE (1 of 2) 11/24/1991   • LIPID PANEL  05/15/2021   • TDAP/TD VACCINES (2 - Td) 09/18/2028   • INFLUENZA VACCINE  Completed          ECG 12 Lead    Date/Time: 11/23/2020 10:37 AM  Performed by: Dante Tabor MD  Authorized by: Dante Tabor MD   Comparison: compared with previous ECG from 5/21/2020  Comparison to previous ECG: Now with PVC  Rhythm: sinus rhythm  Ectopy: unifocal PVCs  Rate: normal  Conduction: right bundle branch block  QRS axis: normal    Clinical impression: abnormal EKG  Comments: RBBB with PVCs          The following portions of the patient's history were reviewed and updated as appropriate: allergies, current medications, past  family history, past medical history, past social history, past surgical history and problem list.    Outpatient Medications Prior to Visit   Medication Sig Dispense Refill   • aspirin 81 MG EC tablet Take 81 mg by mouth Daily.     • atenolol (TENORMIN) 25 MG tablet Take 1 tablet by mouth Daily. 90 tablet 3   • atorvastatin (LIPITOR) 80 MG tablet TAKE 1 TABLET BY MOUTH EVERY DAY 90 tablet 1   • Cholecalciferol (VITAMIN D3) 2000 units tablet Take  by mouth.     • furosemide (LASIX) 20 MG tablet Take 1 tablet by mouth Daily. 90 tablet 1   • isosorbide mononitrate (IMDUR) 30 MG 24 hr tablet TAKE 1 TABLET BY MOUTH DAILY 90 tablet 3   • losartan (COZAAR) 50 MG tablet Take 1 tablet by mouth Daily. 90 tablet 3   • Multiple Vitamin (MULTI-VITAMIN DAILY PO) Take  by mouth.     • nitroglycerin (NITROSTAT) 0.4 MG SL tablet Place 0.4 mg under the tongue As Needed.     • Omega-3 Fatty Acids (FISH OIL) 1000 MG capsule capsule Take  by mouth Daily With Breakfast.     • omeprazole (priLOSEC) 20 MG capsule Take 20 mg by mouth Daily.     • ranolazine (RANEXA) 1000 MG 12 hr tablet TAKE 1 TABLET BY MOUTH EVERY 12 HOURS 180 tablet 1     No facility-administered medications prior to visit.        Patient Active Problem List   Diagnosis   • CAD (coronary artery disease)   • GERD (gastroesophageal reflux disease)   • Hx of CABG   • Other hyperlipidemia   • Essential hypertension   • Renal insufficiency   • Increased PTH level   • EVELINA on CPAP   • Multiple lung nodules on CT   • Medicare annual wellness visit, subsequent   • Dyspnea on exertion   • PVC (premature ventricular contraction)       Advanced Care Planning:  ACP discussion was held with the patient during this visit. Patient has an advance directive (not in EMR), copy requested.    Review of Systems   Respiratory: Positive for shortness of breath.    Cardiovascular: Negative for chest pain.       Compared to one year ago, the patient feels his physical health is worse.  Compared to  "one year ago, the patient feels his mental health is the same.    Reviewed chart for potential of high risk medication in the elderly: yes  Reviewed chart for potential of harmful drug interactions in the elderly:yes    Objective         Vitals:    11/23/20 0958   BP: 116/76   Pulse: 59   Temp: 97 °F (36.1 °C)   SpO2: 98%   Weight: 93.4 kg (206 lb)   Height: 177.8 cm (70\")       Body mass index is 29.56 kg/m².  Discussed the patient's BMI with him. The BMI is in the acceptable range.    Physical Exam  Vitals signs reviewed.   Constitutional:       Appearance: He is well-developed.   HENT:      Head: Normocephalic and atraumatic.   Cardiovascular:      Rate and Rhythm: Normal rate and regular rhythm.      Heart sounds: Normal heart sounds, S1 normal and S2 normal.   Pulmonary:      Effort: Pulmonary effort is normal.      Breath sounds: Normal breath sounds.   Skin:     General: Skin is warm.   Neurological:      Mental Status: He is alert.   Psychiatric:         Behavior: Behavior normal.         Lab Results   Component Value Date    GLU 99 11/17/2020        Assessment/Plan   Medicare Risks and Personalized Health Plan  CMS Preventative Services Quick Reference  Advance Directive Discussion  Immunizations Discussed/Encouraged (specific immunizations; Shingrix )    The above risks/problems have been discussed with the patient.  Pertinent information has been shared with the patient in the After Visit Summary.  Follow up plans and orders are seen below in the Assessment/Plan Section.    Diagnoses and all orders for this visit:    1. Medicare annual wellness visit, subsequent (Primary)    2. Coronary artery disease involving native coronary artery of native heart without angina pectoris  -     Adult Transthoracic Echo Complete W/ Cont if Necessary Per Protocol; Future  -     Cancel: Comprehensive Metabolic Panel; Future  -     Cancel: Lipid Panel With / Chol / HDL Ratio; Future  -     Comprehensive Metabolic Panel; " Future  -     Lipid Panel With / Chol / HDL Ratio; Future  -     ECG 12 Lead    3. Essential hypertension  -     TSH    4. PVC (premature ventricular contraction)  -     Adult Transthoracic Echo Complete W/ Cont if Necessary Per Protocol; Future  -     Holter monitor - 24 hour; Future  -     CBC & Differential  -     TSH  -     Magnesium  -     ECG 12 Lead    5. Multiple lung nodules on CT    6. Renal insufficiency    7. Dyspnea on exertion  -     Adult Transthoracic Echo Complete W/ Cont if Necessary Per Protocol; Future  -     CBC & Differential  -     ECG 12 Lead    8. Need for hepatitis C screening test  -     Hepatitis C Antibody    9. Screening for prostate cancer  -     PSA Screen; Future      Follow Up:  Return in about 6 months (around 5/23/2021), or 30 minutes.     An After Visit Summary and PPPS were given to the patient.         Reviewed labs and chest CT. In process of getting renal USN from 2/17 to compare to CT; I called again today.  Recc Liliane. Lung nodules stable. Given his dyspnea and PVC, I am going to get a holter and echo. BP is good.

## 2020-11-24 LAB
BASOPHILS # BLD AUTO: 0.1 X10E3/UL (ref 0–0.2)
BASOPHILS NFR BLD AUTO: 1 %
EOSINOPHIL # BLD AUTO: 0.1 X10E3/UL (ref 0–0.4)
EOSINOPHIL NFR BLD AUTO: 1 %
ERYTHROCYTE [DISTWIDTH] IN BLOOD BY AUTOMATED COUNT: 12 % (ref 11.6–15.4)
HCT VFR BLD AUTO: 39.4 % (ref 37.5–51)
HCV AB S/CO SERPL IA: <0.1 S/CO RATIO (ref 0–0.9)
HGB BLD-MCNC: 13.4 G/DL (ref 13–17.7)
IMM GRANULOCYTES # BLD AUTO: 0 X10E3/UL (ref 0–0.1)
IMM GRANULOCYTES NFR BLD AUTO: 0 %
LYMPHOCYTES # BLD AUTO: 1.2 X10E3/UL (ref 0.7–3.1)
LYMPHOCYTES NFR BLD AUTO: 14 %
MAGNESIUM SERPL-MCNC: 2.1 MG/DL (ref 1.6–2.3)
MCH RBC QN AUTO: 32.3 PG (ref 26.6–33)
MCHC RBC AUTO-ENTMCNC: 34 G/DL (ref 31.5–35.7)
MCV RBC AUTO: 95 FL (ref 79–97)
MONOCYTES # BLD AUTO: 0.9 X10E3/UL (ref 0.1–0.9)
MONOCYTES NFR BLD AUTO: 10 %
NEUTROPHILS # BLD AUTO: 6.4 X10E3/UL (ref 1.4–7)
NEUTROPHILS NFR BLD AUTO: 74 %
PLATELET # BLD AUTO: 154 X10E3/UL (ref 150–450)
RBC # BLD AUTO: 4.15 X10E6/UL (ref 4.14–5.8)
TSH SERPL DL<=0.005 MIU/L-ACNC: 2.2 UIU/ML (ref 0.45–4.5)
WBC # BLD AUTO: 8.7 X10E3/UL (ref 3.4–10.8)

## 2020-11-30 DIAGNOSIS — N28.1 RENAL CYST: Primary | ICD-10-CM

## 2020-12-09 ENCOUNTER — HOSPITAL ENCOUNTER (OUTPATIENT)
Dept: ULTRASOUND IMAGING | Facility: HOSPITAL | Age: 80
Discharge: HOME | End: 2020-12-09
Admitting: INTERNAL MEDICINE

## 2020-12-09 DIAGNOSIS — N28.1 RENAL CYST: ICD-10-CM

## 2020-12-09 PROCEDURE — 76775 US EXAM ABDO BACK WALL LIM: CPT

## 2020-12-28 ENCOUNTER — HOSPITAL ENCOUNTER (OUTPATIENT)
Dept: CARDIOLOGY | Facility: HOSPITAL | Age: 80
Discharge: HOME | End: 2020-12-28

## 2020-12-28 DIAGNOSIS — I25.10 CORONARY ARTERY DISEASE INVOLVING NATIVE CORONARY ARTERY OF NATIVE HEART WITHOUT ANGINA PECTORIS: ICD-10-CM

## 2020-12-28 DIAGNOSIS — I49.3 PVC (PREMATURE VENTRICULAR CONTRACTION): ICD-10-CM

## 2020-12-28 DIAGNOSIS — R06.09 DYSPNEA ON EXERTION: ICD-10-CM

## 2020-12-28 DIAGNOSIS — R93.1 ABNORMAL ECHOCARDIOGRAM: Primary | ICD-10-CM

## 2020-12-28 LAB
AORTIC DIMENSIONLESS INDEX: 0.7 (DI)
BH CV ECHO MEAS - AI DEC SLOPE: 220 CM/SEC^2
BH CV ECHO MEAS - AI MAX PG: 68.9 MMHG
BH CV ECHO MEAS - AI MAX VEL: 415 CM/SEC
BH CV ECHO MEAS - AI P1/2T: 552.5 MSEC
BH CV ECHO MEAS - AO MAX PG (FULL): 0.68 MMHG
BH CV ECHO MEAS - AO MAX PG: 8.3 MMHG
BH CV ECHO MEAS - AO MEAN PG (FULL): 2 MMHG
BH CV ECHO MEAS - AO MEAN PG: 5 MMHG
BH CV ECHO MEAS - AO ROOT AREA (BSA CORRECTED): 1.4
BH CV ECHO MEAS - AO ROOT AREA: 7.1 CM^2
BH CV ECHO MEAS - AO ROOT DIAM: 3 CM
BH CV ECHO MEAS - AO V2 MAX: 144 CM/SEC
BH CV ECHO MEAS - AO V2 MEAN: 105 CM/SEC
BH CV ECHO MEAS - AO V2 VTI: 31.8 CM
BH CV ECHO MEAS - AVA(I,A): 2.6 CM^2
BH CV ECHO MEAS - AVA(I,D): 2.6 CM^2
BH CV ECHO MEAS - AVA(V,A): 3.3 CM^2
BH CV ECHO MEAS - AVA(V,D): 3.3 CM^2
BH CV ECHO MEAS - BSA(HAYCOCK): 2.2 M^2
BH CV ECHO MEAS - BSA: 2.1 M^2
BH CV ECHO MEAS - BZI_BMI: 29.6 KILOGRAMS/M^2
BH CV ECHO MEAS - BZI_METRIC_HEIGHT: 177.8 CM
BH CV ECHO MEAS - BZI_METRIC_WEIGHT: 93.4 KG
BH CV ECHO MEAS - EDV(CUBED): 157.5 ML
BH CV ECHO MEAS - EDV(MOD-SP2): 129 ML
BH CV ECHO MEAS - EDV(MOD-SP4): 137 ML
BH CV ECHO MEAS - EDV(TEICH): 141.3 ML
BH CV ECHO MEAS - EF(CUBED): 72.8 %
BH CV ECHO MEAS - EF(MOD-BP): 55.1 %
BH CV ECHO MEAS - EF(MOD-SP2): 43.4 %
BH CV ECHO MEAS - EF(MOD-SP4): 62.8 %
BH CV ECHO MEAS - EF(TEICH): 64 %
BH CV ECHO MEAS - ESV(CUBED): 42.9 ML
BH CV ECHO MEAS - ESV(MOD-SP2): 73 ML
BH CV ECHO MEAS - ESV(MOD-SP4): 51 ML
BH CV ECHO MEAS - ESV(TEICH): 50.9 ML
BH CV ECHO MEAS - FS: 35.2 %
BH CV ECHO MEAS - IVS/LVPW: 1
BH CV ECHO MEAS - IVSD: 0.8 CM
BH CV ECHO MEAS - LAT PEAK E' VEL: 8.9 CM/SEC
BH CV ECHO MEAS - LV DIASTOLIC VOL/BSA (35-75): 64.8 ML/M^2
BH CV ECHO MEAS - LV MASS(C)D: 155 GRAMS
BH CV ECHO MEAS - LV MASS(C)DI: 73.3 GRAMS/M^2
BH CV ECHO MEAS - LV MAX PG: 7.6 MMHG
BH CV ECHO MEAS - LV MEAN PG: 3 MMHG
BH CV ECHO MEAS - LV SYSTOLIC VOL/BSA (12-30): 24.1 ML/M^2
BH CV ECHO MEAS - LV V1 MAX: 138 CM/SEC
BH CV ECHO MEAS - LV V1 MEAN: 82.7 CM/SEC
BH CV ECHO MEAS - LV V1 VTI: 23.7 CM
BH CV ECHO MEAS - LVIDD: 5.4 CM
BH CV ECHO MEAS - LVIDS: 3.5 CM
BH CV ECHO MEAS - LVLD AP2: 8.3 CM
BH CV ECHO MEAS - LVLD AP4: 8.1 CM
BH CV ECHO MEAS - LVLS AP2: 6.8 CM
BH CV ECHO MEAS - LVLS AP4: 6.8 CM
BH CV ECHO MEAS - LVOT AREA (M): 3.5 CM^2
BH CV ECHO MEAS - LVOT AREA: 3.5 CM^2
BH CV ECHO MEAS - LVOT DIAM: 2.1 CM
BH CV ECHO MEAS - LVPWD: 0.8 CM
BH CV ECHO MEAS - MED PEAK E' VEL: 5.8 CM/SEC
BH CV ECHO MEAS - MV A MAX VEL: 74.6 CM/SEC
BH CV ECHO MEAS - MV DEC SLOPE: 357 CM/SEC^2
BH CV ECHO MEAS - MV DEC TIME: 161 SEC
BH CV ECHO MEAS - MV E MAX VEL: 75.4 CM/SEC
BH CV ECHO MEAS - MV E/A: 1
BH CV ECHO MEAS - MV MAX PG: 2.8 MMHG
BH CV ECHO MEAS - MV MEAN PG: 1 MMHG
BH CV ECHO MEAS - MV P1/2T MAX VEL: 83.1 CM/SEC
BH CV ECHO MEAS - MV P1/2T: 68.2 MSEC
BH CV ECHO MEAS - MV V2 MAX: 83.5 CM/SEC
BH CV ECHO MEAS - MV V2 MEAN: 53.2 CM/SEC
BH CV ECHO MEAS - MV V2 VTI: 37.8 CM
BH CV ECHO MEAS - MVA P1/2T LCG: 2.6 CM^2
BH CV ECHO MEAS - MVA(P1/2T): 3.2 CM^2
BH CV ECHO MEAS - MVA(VTI): 2.2 CM^2
BH CV ECHO MEAS - RAP SYSTOLE: 3 MMHG
BH CV ECHO MEAS - RVSP: 28 MMHG
BH CV ECHO MEAS - SI(AO): 106.3 ML/M^2
BH CV ECHO MEAS - SI(CUBED): 54.2 ML/M^2
BH CV ECHO MEAS - SI(LVOT): 38.8 ML/M^2
BH CV ECHO MEAS - SI(MOD-SP2): 26.5 ML/M^2
BH CV ECHO MEAS - SI(MOD-SP4): 40.7 ML/M^2
BH CV ECHO MEAS - SI(TEICH): 42.8 ML/M^2
BH CV ECHO MEAS - SV(AO): 224.8 ML
BH CV ECHO MEAS - SV(CUBED): 114.6 ML
BH CV ECHO MEAS - SV(LVOT): 82.1 ML
BH CV ECHO MEAS - SV(MOD-SP2): 56 ML
BH CV ECHO MEAS - SV(MOD-SP4): 86 ML
BH CV ECHO MEAS - SV(TEICH): 90.4 ML
BH CV ECHO MEAS - TAPSE (>1.6): 2.1 CM
BH CV ECHO MEAS - TR MAX VEL: 249 CM/SEC
BH CV ECHO MEASUREMENTS AVERAGE E/E' RATIO: 10.26
BH CV XLRA - RV BASE: 3.6 CM
BH CV XLRA - TDI S': 7.6 CM/SEC
LEFT ATRIUM VOLUME INDEX: 26 ML/M2
MAXIMAL PREDICTED HEART RATE: 141 BPM
STRESS TARGET HR: 120 BPM

## 2020-12-28 PROCEDURE — 93225 XTRNL ECG REC<48 HRS REC: CPT

## 2020-12-28 PROCEDURE — 25010000002 PERFLUTREN (DEFINITY) 8.476 MG IN SODIUM CHLORIDE 0.9 % 10 ML INJECTION: Performed by: INTERNAL MEDICINE

## 2020-12-28 PROCEDURE — 93306 TTE W/DOPPLER COMPLETE: CPT

## 2020-12-28 PROCEDURE — 93226 XTRNL ECG REC<48 HR SCAN A/R: CPT

## 2020-12-28 PROCEDURE — 93306 TTE W/DOPPLER COMPLETE: CPT | Performed by: INTERNAL MEDICINE

## 2020-12-28 RX ADMIN — PERFLUTREN 3 ML: 6.52 INJECTION, SUSPENSION INTRAVENOUS at 13:00

## 2020-12-30 ENCOUNTER — TELEPHONE (OUTPATIENT)
Dept: INTERNAL MEDICINE | Facility: CLINIC | Age: 80
End: 2020-12-30

## 2020-12-31 NOTE — TELEPHONE ENCOUNTER
Caller: MARIANA CORTES    Relationship: Emergency Contact    Best call back number: [phone]    PATIENT'S WIFE CALLED TO CHECK ON HIS REFERRAL TO CARDIOLOGY. SHE SAID THEY HAD NOT HEARD ANYTHING YET AND WANTED TO CHECK IF AN APPOINTMENT HAD BEEN SCHEDULED, WOULD LIKE A CALLBACK            
PT informed    
appt is on 1/5/21 with Bernadette  
Pacemaker

## 2021-01-05 ENCOUNTER — OFFICE VISIT (OUTPATIENT)
Dept: CARDIOLOGY | Facility: CLINIC | Age: 81
End: 2021-01-05

## 2021-01-05 ENCOUNTER — TRANSCRIBE ORDERS (OUTPATIENT)
Dept: ADMINISTRATIVE | Facility: HOSPITAL | Age: 81
End: 2021-01-05

## 2021-01-05 VITALS
BODY MASS INDEX: 29.2 KG/M2 | DIASTOLIC BLOOD PRESSURE: 82 MMHG | HEART RATE: 60 BPM | SYSTOLIC BLOOD PRESSURE: 140 MMHG | WEIGHT: 204 LBS | HEIGHT: 70 IN

## 2021-01-05 DIAGNOSIS — I10 ESSENTIAL HYPERTENSION: ICD-10-CM

## 2021-01-05 DIAGNOSIS — Z95.1 HX OF CABG: Primary | ICD-10-CM

## 2021-01-05 DIAGNOSIS — Z01.818 OTHER SPECIFIED PRE-OPERATIVE EXAMINATION: Primary | ICD-10-CM

## 2021-01-05 DIAGNOSIS — I49.3 PVC (PREMATURE VENTRICULAR CONTRACTION): ICD-10-CM

## 2021-01-05 PROCEDURE — 93000 ELECTROCARDIOGRAM COMPLETE: CPT | Performed by: INTERNAL MEDICINE

## 2021-01-05 PROCEDURE — 99205 OFFICE O/P NEW HI 60 MIN: CPT | Performed by: INTERNAL MEDICINE

## 2021-01-05 NOTE — PROGRESS NOTES
Subjective:     Encounter Date:01/05/2021      Patient ID: Kenn Brito is a 80 y.o. male.    Chief Complaint: Coronary artery disease, PVCs, sleep apnea, hypertension.  History of Present Illness    80-year-old gentleman who presents today for establishment of care.  Patient has been followed by cardiologist.  His son is Chris Juan Santos.  Patient complains of some shortness of breath with exertion.  He says sometimes he has a little bit of discomfort.  Patient does have a lot of muscle skeletal problems.  Patient says that he had a stent back in 2005 coronary artery bypass grafting back in 2000.  He underwent an echocardiogram that showed a segmental wall motion abnormality in the anterior wall as well as some mild regurgitation in several of his heart valves.  With that he was referred by Dr. Tabor.    Review of Systems   Musculoskeletal: Positive for muscle weakness.   All other systems reviewed and are negative.        ECG 12 Lead    Date/Time: 1/5/2021 10:15 AM  Performed by: Sam Wright MD  Authorized by: Sam Wright MD   Comparison: compared with previous ECG from 11/23/2020  Similar to previous ECG  Rhythm: sinus rhythm  Ectopy: unifocal PVCs  Conduction: right bundle branch block    Clinical impression: abnormal EKG               Objective:     Vitals signs reviewed.   Constitutional:       Appearance: Well-developed.   Eyes:      Conjunctiva/sclera: Conjunctivae normal.   HENT:      Head: Normocephalic.   Neck:      Musculoskeletal: Normal range of motion.   Pulmonary:      Breath sounds: Normal breath sounds.   Cardiovascular:      Normal rate. Regular rhythm.   Musculoskeletal: Normal range of motion.   Skin:     General: Skin is warm and dry.   Neurological:      Mental Status: Alert and oriented to person, place, and time.   Psychiatric:         Behavior: Behavior normal.         Lab Review:       Assessment:          Diagnosis Plan   1. Hx of CABG     2. Essential  hypertension     3. PVC (premature ventricular contraction)            Plan:       1.  History of coronary artery disease.  Patient has a segmental wall motion abnormality in his anterior wall.  Patient had a heart catheterization that showed a occlusion of a vein graft to the first diagonal.  I think this explains the wall motion abnormality.  Patient does have some chest discomfort/shortness of breath with exertion.  Patient is on multiple antianginals including Ranexa and Imdur which she has been on for several years.  After our visit I had spoke with his son per the patient's request.  His son said that he was told that he does not have operable coronary artery disease.  I do have a cardiac catheterization report but not the films.  He has quite extensive coronary artery disease including 30 to 40% left main 100% occluded RCA at the origin of vein graft to the obtuse marginal 100% occluded vein graft to the diagonal 100% occluded the vein graft to the RCA was patent.  He did have small to medium vessel disease but is PDL at 80% stenosis with some right to left collateral flow.  I will get a set him up for a nuclear perfusion study to assess for amount of ischemia.  If he has minimal amount of ischemia I would continue his current regimen.  2.  Mild aortic regurgitation  3.  Mild mitral regurgitation  4.  Mild tricuspid rotation with a normal RV pressure.  5.  History of PVCs.  He still has PVCs on his ECG today that are unchanged.  6.  His overall LV function still remains normal with estimated ejection fraction in the mid 50s.  This is obviously a good thing I encouraged him to stay exercising or as active as he can.  7.  If the stress test is unremarkable I see him in 6 to 12 months.      Total time spent including reviewing his old records 60 minutes

## 2021-01-06 PROCEDURE — 93227 XTRNL ECG REC<48 HR R&I: CPT | Performed by: INTERNAL MEDICINE

## 2021-01-09 ENCOUNTER — LAB (OUTPATIENT)
Dept: LAB | Facility: HOSPITAL | Age: 81
End: 2021-01-09

## 2021-01-09 DIAGNOSIS — Z01.818 OTHER SPECIFIED PRE-OPERATIVE EXAMINATION: ICD-10-CM

## 2021-01-09 PROCEDURE — U0004 COV-19 TEST NON-CDC HGH THRU: HCPCS

## 2021-01-09 PROCEDURE — C9803 HOPD COVID-19 SPEC COLLECT: HCPCS

## 2021-01-11 LAB — SARS-COV-2 RNA RESP QL NAA+PROBE: NOT DETECTED

## 2021-01-12 ENCOUNTER — HOSPITAL ENCOUNTER (OUTPATIENT)
Dept: CARDIOLOGY | Facility: HOSPITAL | Age: 81
Discharge: HOME | End: 2021-01-12
Admitting: INTERNAL MEDICINE

## 2021-01-12 VITALS — HEIGHT: 71 IN | WEIGHT: 204 LBS | BODY MASS INDEX: 28.56 KG/M2

## 2021-01-12 DIAGNOSIS — I49.3 PVC (PREMATURE VENTRICULAR CONTRACTION): ICD-10-CM

## 2021-01-12 DIAGNOSIS — Z95.1 HX OF CABG: ICD-10-CM

## 2021-01-12 DIAGNOSIS — I10 ESSENTIAL HYPERTENSION: ICD-10-CM

## 2021-01-12 LAB
BH CV NUCLEAR PRIOR STUDY: 3
BH CV STRESS BP STAGE 1: NORMAL
BH CV STRESS COMMENTS STAGE 1: NORMAL
BH CV STRESS DOSE REGADENOSON STAGE 1: 0.4
BH CV STRESS DURATION MIN STAGE 1: 0
BH CV STRESS DURATION SEC STAGE 1: 10
BH CV STRESS HR STAGE 1: 76
BH CV STRESS PROTOCOL 1: NORMAL
BH CV STRESS RECOVERY BP: NORMAL MMHG
BH CV STRESS RECOVERY HR: 63 BPM
BH CV STRESS STAGE 1: 1
LV EF NUC BP: 66 %
MAXIMAL PREDICTED HEART RATE: 141 BPM
PERCENT MAX PREDICTED HR: 53.9 %
STRESS BASELINE BP: NORMAL MMHG
STRESS BASELINE HR: 60 BPM
STRESS PERCENT HR: 63 %
STRESS POST EXERCISE DUR SEC: 10 SEC
STRESS POST PEAK BP: NORMAL MMHG
STRESS POST PEAK HR: 76 BPM
STRESS TARGET HR: 120 BPM

## 2021-01-12 PROCEDURE — A9555 RB82 RUBIDIUM: HCPCS | Performed by: INTERNAL MEDICINE

## 2021-01-12 PROCEDURE — 78492 MYOCRD IMG PET MLT RST&STRS: CPT

## 2021-01-12 PROCEDURE — 93017 CV STRESS TEST TRACING ONLY: CPT

## 2021-01-12 PROCEDURE — 93016 CV STRESS TEST SUPVJ ONLY: CPT | Performed by: INTERNAL MEDICINE

## 2021-01-12 PROCEDURE — 78492 MYOCRD IMG PET MLT RST&STRS: CPT | Performed by: INTERNAL MEDICINE

## 2021-01-12 PROCEDURE — 25010000002 REGADENOSON 0.4 MG/5ML SOLUTION: Performed by: INTERNAL MEDICINE

## 2021-01-12 PROCEDURE — 93018 CV STRESS TEST I&R ONLY: CPT | Performed by: INTERNAL MEDICINE

## 2021-01-12 PROCEDURE — 0 RUBIDIUM CHLORIDE: Performed by: INTERNAL MEDICINE

## 2021-01-12 RX ADMIN — REGADENOSON 0.4 MG: 0.08 INJECTION, SOLUTION INTRAVENOUS at 08:15

## 2021-01-14 ENCOUNTER — TELEPHONE (OUTPATIENT)
Dept: CARDIOLOGY | Facility: CLINIC | Age: 81
End: 2021-01-14

## 2021-01-14 NOTE — TELEPHONE ENCOUNTER
Spoke to Rody in Medical Records @ [facility] # [phone] about having the heart cath films from 03-25-16 sent to Dr. Wright.  She is sending the request and said it will take a few days to receive it./prt

## 2021-01-19 ENCOUNTER — TELEPHONE (OUTPATIENT)
Dept: CARDIOLOGY | Facility: CLINIC | Age: 81
End: 2021-01-19

## 2021-01-19 NOTE — TELEPHONE ENCOUNTER
Disc received from [place] that you were waiting on.  In your box to review.    Thanks,  Maritza

## 2021-01-20 ENCOUNTER — HOSPITAL ENCOUNTER (OUTPATIENT)
Dept: CARDIOLOGY | Facility: HOSPITAL | Age: 81
Discharge: HOME | End: 2021-01-20

## 2021-01-20 ENCOUNTER — TELEPHONE (OUTPATIENT)
Dept: CARDIOLOGY | Facility: CLINIC | Age: 81
End: 2021-01-20

## 2021-01-20 DIAGNOSIS — Z09 FOLLOW UP: ICD-10-CM

## 2021-01-20 DIAGNOSIS — I25.10 CORONARY ARTERY DISEASE INVOLVING NATIVE CORONARY ARTERY OF NATIVE HEART WITHOUT ANGINA PECTORIS: Primary | ICD-10-CM

## 2021-01-20 NOTE — TELEPHONE ENCOUNTER
Pt's wife is calling bk about the msg you left.  She said she wants you to call her back about the heart cath because she needs to know exactly what is going on.    Thanks,  Maritza    [phone]

## 2021-01-25 ENCOUNTER — TRANSCRIBE ORDERS (OUTPATIENT)
Dept: CARDIOLOGY | Facility: CLINIC | Age: 81
End: 2021-01-25

## 2021-01-25 ENCOUNTER — TRANSCRIBE ORDERS (OUTPATIENT)
Dept: SLEEP MEDICINE | Facility: HOSPITAL | Age: 81
End: 2021-01-25

## 2021-01-25 DIAGNOSIS — Z13.6 SCREENING FOR ISCHEMIC HEART DISEASE: ICD-10-CM

## 2021-01-25 DIAGNOSIS — Z01.810 PRE-OPERATIVE CARDIOVASCULAR EXAMINATION: Primary | ICD-10-CM

## 2021-01-25 DIAGNOSIS — Z01.818 OTHER SPECIFIED PRE-OPERATIVE EXAMINATION: Primary | ICD-10-CM

## 2021-01-25 PROBLEM — I25.10 CORONARY ARTERY DISEASE INVOLVING NATIVE CORONARY ARTERY OF NATIVE HEART WITHOUT ANGINA PECTORIS: Status: ACTIVE | Noted: 2021-01-25

## 2021-01-26 ENCOUNTER — LAB (OUTPATIENT)
Dept: LAB | Facility: HOSPITAL | Age: 81
End: 2021-01-26

## 2021-01-26 DIAGNOSIS — Z13.6 SCREENING FOR ISCHEMIC HEART DISEASE: ICD-10-CM

## 2021-01-26 DIAGNOSIS — Z01.810 PRE-OPERATIVE CARDIOVASCULAR EXAMINATION: ICD-10-CM

## 2021-01-26 DIAGNOSIS — Z01.818 OTHER SPECIFIED PRE-OPERATIVE EXAMINATION: ICD-10-CM

## 2021-01-26 LAB
ANION GAP SERPL CALCULATED.3IONS-SCNC: 7.9 MMOL/L (ref 5–15)
BASOPHILS # BLD AUTO: 0.06 10*3/MM3 (ref 0–0.2)
BASOPHILS NFR BLD AUTO: 0.8 % (ref 0–1.5)
BUN SERPL-MCNC: 32 MG/DL (ref 8–23)
BUN/CREAT SERPL: 25 (ref 7–25)
CALCIUM SPEC-SCNC: 9.8 MG/DL (ref 8.6–10.5)
CHLORIDE SERPL-SCNC: 103 MMOL/L (ref 98–107)
CO2 SERPL-SCNC: 28.1 MMOL/L (ref 22–29)
CREAT SERPL-MCNC: 1.28 MG/DL (ref 0.76–1.27)
DEPRECATED RDW RBC AUTO: 41 FL (ref 37–54)
EOSINOPHIL # BLD AUTO: 0.1 10*3/MM3 (ref 0–0.4)
EOSINOPHIL NFR BLD AUTO: 1.3 % (ref 0.3–6.2)
ERYTHROCYTE [DISTWIDTH] IN BLOOD BY AUTOMATED COUNT: 12 % (ref 12.3–15.4)
GFR SERPL CREATININE-BSD FRML MDRD: 54 ML/MIN/1.73
GLUCOSE SERPL-MCNC: 95 MG/DL (ref 65–99)
HCT VFR BLD AUTO: 40 % (ref 37.5–51)
HGB BLD-MCNC: 13.1 G/DL (ref 13–17.7)
IMM GRANULOCYTES # BLD AUTO: 0.03 10*3/MM3 (ref 0–0.05)
IMM GRANULOCYTES NFR BLD AUTO: 0.4 % (ref 0–0.5)
LYMPHOCYTES # BLD AUTO: 1.39 10*3/MM3 (ref 0.7–3.1)
LYMPHOCYTES NFR BLD AUTO: 18 % (ref 19.6–45.3)
MCH RBC QN AUTO: 30.8 PG (ref 26.6–33)
MCHC RBC AUTO-ENTMCNC: 32.8 G/DL (ref 31.5–35.7)
MCV RBC AUTO: 93.9 FL (ref 79–97)
MONOCYTES # BLD AUTO: 0.77 10*3/MM3 (ref 0.1–0.9)
MONOCYTES NFR BLD AUTO: 10 % (ref 5–12)
NEUTROPHILS NFR BLD AUTO: 5.36 10*3/MM3 (ref 1.7–7)
NEUTROPHILS NFR BLD AUTO: 69.5 % (ref 42.7–76)
NRBC BLD AUTO-RTO: 0 /100 WBC (ref 0–0.2)
PLATELET # BLD AUTO: 175 10*3/MM3 (ref 140–450)
PMV BLD AUTO: 11.9 FL (ref 6–12)
POTASSIUM SERPL-SCNC: 4.5 MMOL/L (ref 3.5–5.2)
RBC # BLD AUTO: 4.26 10*6/MM3 (ref 4.14–5.8)
SODIUM SERPL-SCNC: 139 MMOL/L (ref 136–145)
WBC # BLD AUTO: 7.71 10*3/MM3 (ref 3.4–10.8)

## 2021-01-26 PROCEDURE — 85025 COMPLETE CBC W/AUTO DIFF WBC: CPT

## 2021-01-26 PROCEDURE — C9803 HOPD COVID-19 SPEC COLLECT: HCPCS

## 2021-01-26 PROCEDURE — 80048 BASIC METABOLIC PNL TOTAL CA: CPT

## 2021-01-26 PROCEDURE — 36415 COLL VENOUS BLD VENIPUNCTURE: CPT

## 2021-01-26 PROCEDURE — U0004 COV-19 TEST NON-CDC HGH THRU: HCPCS

## 2021-01-27 LAB — SARS-COV-2 RNA RESP QL NAA+PROBE: NOT DETECTED

## 2021-01-28 ENCOUNTER — HOSPITAL ENCOUNTER (OUTPATIENT)
Facility: HOSPITAL | Age: 81
Discharge: HOME | End: 2021-01-29
Attending: INTERNAL MEDICINE | Admitting: INTERNAL MEDICINE

## 2021-01-28 DIAGNOSIS — Z99.89 OSA ON CPAP: ICD-10-CM

## 2021-01-28 DIAGNOSIS — E78.49 OTHER HYPERLIPIDEMIA: ICD-10-CM

## 2021-01-28 DIAGNOSIS — Z00.00 MEDICARE ANNUAL WELLNESS VISIT, SUBSEQUENT: ICD-10-CM

## 2021-01-28 DIAGNOSIS — I25.10 CORONARY ARTERY DISEASE INVOLVING NATIVE CORONARY ARTERY OF NATIVE HEART WITHOUT ANGINA PECTORIS: ICD-10-CM

## 2021-01-28 DIAGNOSIS — G47.33 OSA ON CPAP: ICD-10-CM

## 2021-01-28 DIAGNOSIS — I49.3 PVC (PREMATURE VENTRICULAR CONTRACTION): ICD-10-CM

## 2021-01-28 DIAGNOSIS — R91.8 MULTIPLE LUNG NODULES ON CT: ICD-10-CM

## 2021-01-28 DIAGNOSIS — Z95.1 HX OF CABG: ICD-10-CM

## 2021-01-28 DIAGNOSIS — R06.09 DYSPNEA ON EXERTION: ICD-10-CM

## 2021-01-28 DIAGNOSIS — N28.9 RENAL INSUFFICIENCY: ICD-10-CM

## 2021-01-28 DIAGNOSIS — I10 ESSENTIAL HYPERTENSION: ICD-10-CM

## 2021-01-28 DIAGNOSIS — K21.9 GASTROESOPHAGEAL REFLUX DISEASE, UNSPECIFIED WHETHER ESOPHAGITIS PRESENT: Primary | ICD-10-CM

## 2021-01-28 DIAGNOSIS — E34.9 INCREASED PTH LEVEL: ICD-10-CM

## 2021-01-28 LAB
ACT BLD: 263 SECONDS (ref 82–152)
ACT BLD: 285 SECONDS (ref 82–152)
ANION GAP SERPL CALCULATED.3IONS-SCNC: 7 MMOL/L (ref 5–15)
BASOPHILS # BLD AUTO: 0.08 10*3/MM3 (ref 0–0.2)
BASOPHILS NFR BLD AUTO: 1.1 % (ref 0–1.5)
BUN SERPL-MCNC: 29 MG/DL (ref 8–23)
BUN/CREAT SERPL: 23 (ref 7–25)
CALCIUM SPEC-SCNC: 9.1 MG/DL (ref 8.6–10.5)
CHLORIDE SERPL-SCNC: 106 MMOL/L (ref 98–107)
CO2 SERPL-SCNC: 26 MMOL/L (ref 22–29)
CREAT SERPL-MCNC: 1.26 MG/DL (ref 0.76–1.27)
DEPRECATED RDW RBC AUTO: 40.8 FL (ref 37–54)
EOSINOPHIL # BLD AUTO: 0.14 10*3/MM3 (ref 0–0.4)
EOSINOPHIL NFR BLD AUTO: 1.9 % (ref 0.3–6.2)
ERYTHROCYTE [DISTWIDTH] IN BLOOD BY AUTOMATED COUNT: 12 % (ref 12.3–15.4)
GFR SERPL CREATININE-BSD FRML MDRD: 55 ML/MIN/1.73
GLUCOSE SERPL-MCNC: 110 MG/DL (ref 65–99)
HCT VFR BLD AUTO: 36.3 % (ref 37.5–51)
HGB BLD-MCNC: 12.6 G/DL (ref 13–17.7)
IMM GRANULOCYTES # BLD AUTO: 0.04 10*3/MM3 (ref 0–0.05)
IMM GRANULOCYTES NFR BLD AUTO: 0.5 % (ref 0–0.5)
LYMPHOCYTES # BLD AUTO: 1.82 10*3/MM3 (ref 0.7–3.1)
LYMPHOCYTES NFR BLD AUTO: 24.3 % (ref 19.6–45.3)
MCH RBC QN AUTO: 32.6 PG (ref 26.6–33)
MCHC RBC AUTO-ENTMCNC: 34.7 G/DL (ref 31.5–35.7)
MCV RBC AUTO: 94 FL (ref 79–97)
MONOCYTES # BLD AUTO: 1.04 10*3/MM3 (ref 0.1–0.9)
MONOCYTES NFR BLD AUTO: 13.9 % (ref 5–12)
NEUTROPHILS NFR BLD AUTO: 4.36 10*3/MM3 (ref 1.7–7)
NEUTROPHILS NFR BLD AUTO: 58.3 % (ref 42.7–76)
NRBC BLD AUTO-RTO: 0 /100 WBC (ref 0–0.2)
PLATELET # BLD AUTO: 160 10*3/MM3 (ref 140–450)
PMV BLD AUTO: 11.7 FL (ref 6–12)
POTASSIUM SERPL-SCNC: 3.8 MMOL/L (ref 3.5–5.2)
QT INTERVAL: 537 MS
RBC # BLD AUTO: 3.86 10*6/MM3 (ref 4.14–5.8)
SODIUM SERPL-SCNC: 139 MMOL/L (ref 136–145)
WBC # BLD AUTO: 7.48 10*3/MM3 (ref 3.4–10.8)

## 2021-01-28 PROCEDURE — 93571 IV DOP VEL&/PRESS C FLO 1ST: CPT | Performed by: INTERNAL MEDICINE

## 2021-01-28 PROCEDURE — 25010000002 MIDAZOLAM PER 1 MG: Performed by: INTERNAL MEDICINE

## 2021-01-28 PROCEDURE — G0378 HOSPITAL OBSERVATION PER HR: HCPCS

## 2021-01-28 PROCEDURE — C1874 STENT, COATED/COV W/DEL SYS: HCPCS | Performed by: INTERNAL MEDICINE

## 2021-01-28 PROCEDURE — 85025 COMPLETE CBC W/AUTO DIFF WBC: CPT | Performed by: INTERNAL MEDICINE

## 2021-01-28 PROCEDURE — C1894 INTRO/SHEATH, NON-LASER: HCPCS | Performed by: INTERNAL MEDICINE

## 2021-01-28 PROCEDURE — C1725 CATH, TRANSLUMIN NON-LASER: HCPCS | Performed by: INTERNAL MEDICINE

## 2021-01-28 PROCEDURE — 99153 MOD SED SAME PHYS/QHP EA: CPT | Performed by: INTERNAL MEDICINE

## 2021-01-28 PROCEDURE — 25010000002 FENTANYL CITRATE (PF) 100 MCG/2ML SOLUTION: Performed by: INTERNAL MEDICINE

## 2021-01-28 PROCEDURE — 80048 BASIC METABOLIC PNL TOTAL CA: CPT | Performed by: INTERNAL MEDICINE

## 2021-01-28 PROCEDURE — 93005 ELECTROCARDIOGRAM TRACING: CPT | Performed by: INTERNAL MEDICINE

## 2021-01-28 PROCEDURE — A9270 NON-COVERED ITEM OR SERVICE: HCPCS | Performed by: INTERNAL MEDICINE

## 2021-01-28 PROCEDURE — 92978 ENDOLUMINL IVUS OCT C 1ST: CPT | Performed by: INTERNAL MEDICINE

## 2021-01-28 PROCEDURE — 25010000002 HEPARIN (PORCINE) PER 1000 UNITS: Performed by: INTERNAL MEDICINE

## 2021-01-28 PROCEDURE — 93459 L HRT ART/GRFT ANGIO: CPT | Performed by: INTERNAL MEDICINE

## 2021-01-28 PROCEDURE — 92928 PRQ TCAT PLMT NTRAC ST 1 LES: CPT | Performed by: INTERNAL MEDICINE

## 2021-01-28 PROCEDURE — 0 IOPAMIDOL PER 1 ML: Performed by: INTERNAL MEDICINE

## 2021-01-28 PROCEDURE — C1769 GUIDE WIRE: HCPCS | Performed by: INTERNAL MEDICINE

## 2021-01-28 PROCEDURE — 63710000001 FUROSEMIDE 20 MG TABLET: Performed by: INTERNAL MEDICINE

## 2021-01-28 PROCEDURE — 63710000001 ATORVASTATIN 20 MG TABLET: Performed by: INTERNAL MEDICINE

## 2021-01-28 PROCEDURE — C1753 CATH, INTRAVAS ULTRASOUND: HCPCS | Performed by: INTERNAL MEDICINE

## 2021-01-28 PROCEDURE — 92979 ENDOLUMINL IVUS OCT C EA: CPT | Performed by: INTERNAL MEDICINE

## 2021-01-28 PROCEDURE — C1887 CATHETER, GUIDING: HCPCS | Performed by: INTERNAL MEDICINE

## 2021-01-28 PROCEDURE — 93010 ELECTROCARDIOGRAM REPORT: CPT | Performed by: INTERNAL MEDICINE

## 2021-01-28 PROCEDURE — 85347 COAGULATION TIME ACTIVATED: CPT

## 2021-01-28 PROCEDURE — 63710000001 ISOSORBIDE MONONITRATE 30 MG TABLET SUSTAINED-RELEASE 24 HOUR: Performed by: INTERNAL MEDICINE

## 2021-01-28 PROCEDURE — 92937 PRQ TRLUML REVSC CAB GRF 1: CPT | Performed by: INTERNAL MEDICINE

## 2021-01-28 PROCEDURE — C9600 PERC DRUG-EL COR STENT SING: HCPCS | Performed by: INTERNAL MEDICINE

## 2021-01-28 PROCEDURE — 63710000001 RANOLAZINE 500 MG TABLET SUSTAINED-RELEASE 12 HOUR: Performed by: INTERNAL MEDICINE

## 2021-01-28 PROCEDURE — 99152 MOD SED SAME PHYS/QHP 5/>YRS: CPT | Performed by: INTERNAL MEDICINE

## 2021-01-28 PROCEDURE — C9604 PERC D-E COR REVASC T CABG S: HCPCS | Performed by: INTERNAL MEDICINE

## 2021-01-28 DEVICE — XIENCE SIERRA™ EVEROLIMUS ELUTING CORONARY STENT SYSTEM 3.50 MM X 15 MM / RAPID-EXCHANGE
Type: IMPLANTABLE DEVICE | Status: FUNCTIONAL
Brand: XIENCE SIERRA™

## 2021-01-28 DEVICE — XIENCE SIERRA™ EVEROLIMUS ELUTING CORONARY STENT SYSTEM 4.00 MM X 33 MM / RAPID-EXCHANGE
Type: IMPLANTABLE DEVICE | Status: FUNCTIONAL
Brand: XIENCE SIERRA™

## 2021-01-28 RX ORDER — ASPIRIN 81 MG/1
81 TABLET ORAL DAILY
Status: DISCONTINUED | OUTPATIENT
Start: 2021-01-28 | End: 2021-01-29 | Stop reason: HOSPADM

## 2021-01-28 RX ORDER — LOSARTAN POTASSIUM 50 MG/1
50 TABLET ORAL DAILY
Status: DISCONTINUED | OUTPATIENT
Start: 2021-01-29 | End: 2021-01-29 | Stop reason: HOSPADM

## 2021-01-28 RX ORDER — SODIUM CHLORIDE 9 MG/ML
75 INJECTION, SOLUTION INTRAVENOUS CONTINUOUS
Status: DISCONTINUED | OUTPATIENT
Start: 2021-01-28 | End: 2021-01-29 | Stop reason: HOSPADM

## 2021-01-28 RX ORDER — ATORVASTATIN CALCIUM 80 MG/1
80 TABLET, FILM COATED ORAL DAILY
Status: DISCONTINUED | OUTPATIENT
Start: 2021-01-28 | End: 2021-01-28

## 2021-01-28 RX ORDER — CLOPIDOGREL BISULFATE 75 MG/1
75 TABLET ORAL DAILY
Status: DISCONTINUED | OUTPATIENT
Start: 2021-01-29 | End: 2021-01-29 | Stop reason: HOSPADM

## 2021-01-28 RX ORDER — ONDANSETRON 2 MG/ML
4 INJECTION INTRAMUSCULAR; INTRAVENOUS EVERY 6 HOURS PRN
Status: DISCONTINUED | OUTPATIENT
Start: 2021-01-28 | End: 2021-01-29 | Stop reason: HOSPADM

## 2021-01-28 RX ORDER — ACETAMINOPHEN 325 MG/1
650 TABLET ORAL EVERY 4 HOURS PRN
Status: DISCONTINUED | OUTPATIENT
Start: 2021-01-28 | End: 2021-01-29 | Stop reason: HOSPADM

## 2021-01-28 RX ORDER — FENTANYL CITRATE 50 UG/ML
INJECTION, SOLUTION INTRAMUSCULAR; INTRAVENOUS AS NEEDED
Status: DISCONTINUED | OUTPATIENT
Start: 2021-01-28 | End: 2021-01-28 | Stop reason: HOSPADM

## 2021-01-28 RX ORDER — CLOPIDOGREL BISULFATE 75 MG/1
TABLET ORAL AS NEEDED
Status: DISCONTINUED | OUTPATIENT
Start: 2021-01-28 | End: 2021-01-28 | Stop reason: HOSPADM

## 2021-01-28 RX ORDER — FUROSEMIDE 20 MG/1
20 TABLET ORAL DAILY
Status: DISCONTINUED | OUTPATIENT
Start: 2021-01-28 | End: 2021-01-29 | Stop reason: HOSPADM

## 2021-01-28 RX ORDER — ASPIRIN 81 MG/1
81 TABLET ORAL DAILY
Status: DISCONTINUED | OUTPATIENT
Start: 2021-01-28 | End: 2021-01-28 | Stop reason: SDUPTHER

## 2021-01-28 RX ORDER — ATORVASTATIN CALCIUM 20 MG/1
40 TABLET, FILM COATED ORAL NIGHTLY
Status: DISCONTINUED | OUTPATIENT
Start: 2021-01-28 | End: 2021-01-29 | Stop reason: HOSPADM

## 2021-01-28 RX ORDER — LIDOCAINE HYDROCHLORIDE 10 MG/ML
0.1 INJECTION, SOLUTION EPIDURAL; INFILTRATION; INTRACAUDAL; PERINEURAL ONCE AS NEEDED
Status: DISCONTINUED | OUTPATIENT
Start: 2021-01-28 | End: 2021-01-28

## 2021-01-28 RX ORDER — SODIUM CHLORIDE 9 MG/ML
125 INJECTION, SOLUTION INTRAVENOUS CONTINUOUS
Status: ACTIVE | OUTPATIENT
Start: 2021-01-28 | End: 2021-01-28

## 2021-01-28 RX ORDER — HYDROCODONE BITARTRATE AND ACETAMINOPHEN 5; 325 MG/1; MG/1
1 TABLET ORAL EVERY 4 HOURS PRN
Status: DISCONTINUED | OUTPATIENT
Start: 2021-01-28 | End: 2021-01-29 | Stop reason: HOSPADM

## 2021-01-28 RX ORDER — ONDANSETRON 4 MG/1
4 TABLET, FILM COATED ORAL EVERY 6 HOURS PRN
Status: DISCONTINUED | OUTPATIENT
Start: 2021-01-28 | End: 2021-01-29 | Stop reason: HOSPADM

## 2021-01-28 RX ORDER — ISOSORBIDE MONONITRATE 30 MG/1
30 TABLET, EXTENDED RELEASE ORAL DAILY
Status: DISCONTINUED | OUTPATIENT
Start: 2021-01-28 | End: 2021-01-29 | Stop reason: HOSPADM

## 2021-01-28 RX ORDER — SODIUM CHLORIDE 0.9 % (FLUSH) 0.9 %
10 SYRINGE (ML) INJECTION AS NEEDED
Status: DISCONTINUED | OUTPATIENT
Start: 2021-01-28 | End: 2021-01-28

## 2021-01-28 RX ORDER — RANOLAZINE 500 MG/1
1000 TABLET, EXTENDED RELEASE ORAL EVERY 12 HOURS
Status: DISCONTINUED | OUTPATIENT
Start: 2021-01-28 | End: 2021-01-29 | Stop reason: HOSPADM

## 2021-01-28 RX ORDER — HEPARIN SODIUM 1000 [USP'U]/ML
INJECTION, SOLUTION INTRAVENOUS; SUBCUTANEOUS AS NEEDED
Status: DISCONTINUED | OUTPATIENT
Start: 2021-01-28 | End: 2021-01-28 | Stop reason: HOSPADM

## 2021-01-28 RX ORDER — ATENOLOL 25 MG/1
25 TABLET ORAL DAILY
Status: DISCONTINUED | OUTPATIENT
Start: 2021-01-29 | End: 2021-01-29 | Stop reason: HOSPADM

## 2021-01-28 RX ORDER — MIDAZOLAM HYDROCHLORIDE 1 MG/ML
INJECTION INTRAMUSCULAR; INTRAVENOUS AS NEEDED
Status: DISCONTINUED | OUTPATIENT
Start: 2021-01-28 | End: 2021-01-28 | Stop reason: HOSPADM

## 2021-01-28 RX ORDER — SODIUM CHLORIDE 0.9 % (FLUSH) 0.9 %
3 SYRINGE (ML) INJECTION EVERY 12 HOURS SCHEDULED
Status: DISCONTINUED | OUTPATIENT
Start: 2021-01-28 | End: 2021-01-28

## 2021-01-28 RX ORDER — LIDOCAINE HYDROCHLORIDE 20 MG/ML
INJECTION, SOLUTION INFILTRATION; PERINEURAL AS NEEDED
Status: DISCONTINUED | OUTPATIENT
Start: 2021-01-28 | End: 2021-01-28 | Stop reason: HOSPADM

## 2021-01-28 RX ADMIN — ATORVASTATIN CALCIUM 40 MG: 20 TABLET, FILM COATED ORAL at 20:52

## 2021-01-28 RX ADMIN — Medication 3 ML: at 12:48

## 2021-01-28 RX ADMIN — SODIUM CHLORIDE 75 ML/HR: 9 INJECTION, SOLUTION INTRAVENOUS at 08:55

## 2021-01-28 RX ADMIN — SODIUM CHLORIDE 125 ML/HR: 9 INJECTION, SOLUTION INTRAVENOUS at 13:56

## 2021-01-28 RX ADMIN — RANOLAZINE 1000 MG: 500 TABLET, FILM COATED, EXTENDED RELEASE ORAL at 20:52

## 2021-01-28 RX ADMIN — FUROSEMIDE 20 MG: 20 TABLET ORAL at 14:22

## 2021-01-28 RX ADMIN — ISOSORBIDE MONONITRATE 30 MG: 30 TABLET ORAL at 14:22

## 2021-01-29 ENCOUNTER — READMISSION MANAGEMENT (OUTPATIENT)
Dept: CALL CENTER | Facility: HOSPITAL | Age: 81
End: 2021-01-29

## 2021-01-29 ENCOUNTER — APPOINTMENT (OUTPATIENT)
Dept: CARDIOLOGY | Facility: HOSPITAL | Age: 81
End: 2021-01-29

## 2021-01-29 VITALS
TEMPERATURE: 97.9 F | OXYGEN SATURATION: 96 % | RESPIRATION RATE: 16 BRPM | HEIGHT: 71 IN | DIASTOLIC BLOOD PRESSURE: 64 MMHG | SYSTOLIC BLOOD PRESSURE: 103 MMHG | WEIGHT: 204 LBS | HEART RATE: 57 BPM | BODY MASS INDEX: 28.56 KG/M2

## 2021-01-29 LAB
ANION GAP SERPL CALCULATED.3IONS-SCNC: 7.1 MMOL/L (ref 5–15)
BUN SERPL-MCNC: 26 MG/DL (ref 8–23)
BUN/CREAT SERPL: 18.7 (ref 7–25)
CALCIUM SPEC-SCNC: 9.4 MG/DL (ref 8.6–10.5)
CHLORIDE SERPL-SCNC: 107 MMOL/L (ref 98–107)
CHOLEST SERPL-MCNC: 75 MG/DL (ref 0–200)
CO2 SERPL-SCNC: 23.9 MMOL/L (ref 22–29)
CREAT SERPL-MCNC: 1.39 MG/DL (ref 0.76–1.27)
DEPRECATED RDW RBC AUTO: 42 FL (ref 37–54)
ERYTHROCYTE [DISTWIDTH] IN BLOOD BY AUTOMATED COUNT: 12.2 % (ref 12.3–15.4)
GFR SERPL CREATININE-BSD FRML MDRD: 49 ML/MIN/1.73
GLUCOSE SERPL-MCNC: 99 MG/DL (ref 65–99)
HBA1C MFR BLD: 5.35 % (ref 4.8–5.6)
HCT VFR BLD AUTO: 33.8 % (ref 37.5–51)
HDLC SERPL-MCNC: 30 MG/DL (ref 40–60)
HGB BLD-MCNC: 11.7 G/DL (ref 13–17.7)
LDLC SERPL CALC-MCNC: 26 MG/DL (ref 0–100)
LDLC/HDLC SERPL: 0.85 {RATIO}
MCH RBC QN AUTO: 32.4 PG (ref 26.6–33)
MCHC RBC AUTO-ENTMCNC: 34.6 G/DL (ref 31.5–35.7)
MCV RBC AUTO: 93.6 FL (ref 79–97)
PLATELET # BLD AUTO: 148 10*3/MM3 (ref 140–450)
PMV BLD AUTO: 11.9 FL (ref 6–12)
POTASSIUM SERPL-SCNC: 4.1 MMOL/L (ref 3.5–5.2)
QT INTERVAL: 495 MS
QT INTERVAL: 503 MS
RBC # BLD AUTO: 3.61 10*6/MM3 (ref 4.14–5.8)
SODIUM SERPL-SCNC: 138 MMOL/L (ref 136–145)
TRIGL SERPL-MCNC: 98 MG/DL (ref 0–150)
VLDLC SERPL-MCNC: 19 MG/DL (ref 5–40)
WBC # BLD AUTO: 6.62 10*3/MM3 (ref 3.4–10.8)

## 2021-01-29 PROCEDURE — 80061 LIPID PANEL: CPT | Performed by: INTERNAL MEDICINE

## 2021-01-29 PROCEDURE — 63710000001 ISOSORBIDE MONONITRATE 30 MG TABLET SUSTAINED-RELEASE 24 HOUR: Performed by: INTERNAL MEDICINE

## 2021-01-29 PROCEDURE — 85027 COMPLETE CBC AUTOMATED: CPT | Performed by: INTERNAL MEDICINE

## 2021-01-29 PROCEDURE — 63710000001 ATENOLOL 25 MG TABLET: Performed by: INTERNAL MEDICINE

## 2021-01-29 PROCEDURE — 63710000001 CLOPIDOGREL 75 MG TABLET: Performed by: INTERNAL MEDICINE

## 2021-01-29 PROCEDURE — 63710000001 FUROSEMIDE 20 MG TABLET: Performed by: INTERNAL MEDICINE

## 2021-01-29 PROCEDURE — A9270 NON-COVERED ITEM OR SERVICE: HCPCS | Performed by: INTERNAL MEDICINE

## 2021-01-29 PROCEDURE — 80048 BASIC METABOLIC PNL TOTAL CA: CPT | Performed by: INTERNAL MEDICINE

## 2021-01-29 PROCEDURE — 63710000001 LOSARTAN 50 MG TABLET: Performed by: INTERNAL MEDICINE

## 2021-01-29 PROCEDURE — 93246 EXT ECG>7D<15D RECORDING: CPT

## 2021-01-29 PROCEDURE — 83036 HEMOGLOBIN GLYCOSYLATED A1C: CPT | Performed by: INTERNAL MEDICINE

## 2021-01-29 PROCEDURE — G0378 HOSPITAL OBSERVATION PER HR: HCPCS

## 2021-01-29 PROCEDURE — 99217 PR OBSERVATION CARE DISCHARGE MANAGEMENT: CPT | Performed by: NURSE PRACTITIONER

## 2021-01-29 PROCEDURE — 93005 ELECTROCARDIOGRAM TRACING: CPT | Performed by: INTERNAL MEDICINE

## 2021-01-29 PROCEDURE — 93010 ELECTROCARDIOGRAM REPORT: CPT | Performed by: INTERNAL MEDICINE

## 2021-01-29 PROCEDURE — 63710000001 ASPIRIN 81 MG TABLET DELAYED-RELEASE: Performed by: INTERNAL MEDICINE

## 2021-01-29 PROCEDURE — 63710000001 RANOLAZINE 500 MG TABLET SUSTAINED-RELEASE 12 HOUR: Performed by: INTERNAL MEDICINE

## 2021-01-29 RX ORDER — PANTOPRAZOLE SODIUM 40 MG/1
40 TABLET, DELAYED RELEASE ORAL DAILY
Qty: 30 TABLET | Refills: 11 | Status: SHIPPED | OUTPATIENT
Start: 2021-01-29 | End: 2022-01-21 | Stop reason: SDUPTHER

## 2021-01-29 RX ORDER — ATORVASTATIN CALCIUM 40 MG/1
40 TABLET, FILM COATED ORAL NIGHTLY
Qty: 30 TABLET | Refills: 3 | Status: SHIPPED | OUTPATIENT
Start: 2021-01-29 | End: 2021-02-02

## 2021-01-29 RX ORDER — CLOPIDOGREL BISULFATE 75 MG/1
75 TABLET ORAL DAILY
Qty: 30 TABLET | Refills: 11 | Status: SHIPPED | OUTPATIENT
Start: 2021-01-30 | End: 2022-01-17

## 2021-01-29 RX ADMIN — SODIUM CHLORIDE 75 ML/HR: 9 INJECTION, SOLUTION INTRAVENOUS at 02:18

## 2021-01-29 RX ADMIN — LOSARTAN POTASSIUM 50 MG: 50 TABLET, FILM COATED ORAL at 08:18

## 2021-01-29 RX ADMIN — ATENOLOL 25 MG: 25 TABLET ORAL at 08:18

## 2021-01-29 RX ADMIN — FUROSEMIDE 20 MG: 20 TABLET ORAL at 08:18

## 2021-01-29 RX ADMIN — RANOLAZINE 1000 MG: 500 TABLET, FILM COATED, EXTENDED RELEASE ORAL at 08:18

## 2021-01-29 RX ADMIN — ISOSORBIDE MONONITRATE 30 MG: 30 TABLET ORAL at 08:18

## 2021-01-29 RX ADMIN — ASPIRIN 81 MG: 81 TABLET, COATED ORAL at 08:18

## 2021-01-29 RX ADMIN — CLOPIDOGREL 75 MG: 75 TABLET, FILM COATED ORAL at 08:18

## 2021-01-29 NOTE — OUTREACH NOTE
Prep Survey      Responses   [facility] patient discharged from?  [place]   Is LACE score < 7 ?  Yes   Emergency Room discharge w/ pulse ox?  No   Eligibility  [facility]   Date of Admission  01/28/21   Date of Discharge  01/29/21   Discharge Disposition  Home or Self Care   Discharge diagnosis  Coronary artery disease involving native coronary artery of native heart without angina pectoris,  left heart cath   Does the patient have one of the following disease processes/diagnoses(primary or secondary)?  Other   Does the patient have Home health ordered?  No   Is there a DME ordered?  No   Prep survey completed?  Yes          Ladonna Alvarado RN

## 2021-02-01 ENCOUNTER — TRANSITIONAL CARE MANAGEMENT TELEPHONE ENCOUNTER (OUTPATIENT)
Dept: CALL CENTER | Facility: HOSPITAL | Age: 81
End: 2021-02-01

## 2021-02-01 NOTE — OUTREACH NOTE
Call Center TCM Note      Responses   [facility] patient discharged from?  [place]   Does the patient have one of the following disease processes/diagnoses(primary or secondary)?  Other   TCM attempt successful?  Yes   Call start time  [phone]   Call end time  [phone]   Discharge diagnosis  Coronary artery disease involving native coronary artery of native heart without angina pectoris,  left heart cath   Is patient permission given to speak with other caregiver?  Yes   List who call center can speak with  spouse, Erin Brito.   Person spoke with today (if not patient) and relationship  spouse   Meds reviewed with patient/caregiver?  Yes   Is the patient having any side effects they believe may be caused by any medication additions or changes?  No   Does the patient have all medications ordered at discharge?  Yes   Is the patient taking all medications as directed (includes completed medication regime)?  Yes   Comments regarding appointments  Hospital Follow Up with [facility] Friday Feb 5, 2021 1:00 PM    Does the patient have a primary care provider?   Yes   Does the patient have an appointment with their PCP within 7 days of discharge?  Yes   Comments regarding PCP  PCP Dr Dante Tabor. Hospital follow up in place for tomorrow 2/2/21.   Has the patient kept scheduled appointments due by today?  N/A   Has home health visited the patient within 72 hours of discharge?  N/A   Psychosocial issues?  No   Did the patient receive a copy of their discharge instructions?  Yes   Nursing interventions  Reviewed instructions with patient   What is the patient's perception of their health status since discharge?  Improving   Is the patient/caregiver able to teach back signs and symptoms related to disease process for when to call PCP?  Yes   Is the patient/caregiver able to teach back the hierarchy of who to call/visit for symptoms/problems? PCP, Specialist, Home health nurse, Urgent Care, ED,  [phone]  Yes   If the patient is a current smoker, are they able to teach back resources for cessation?  Not a smoker   TCM call completed?  Yes          Carrie Umana RN    2/1/2021, 08:57 EST

## 2021-02-02 ENCOUNTER — OFFICE VISIT (OUTPATIENT)
Dept: INTERNAL MEDICINE | Facility: CLINIC | Age: 81
End: 2021-02-02

## 2021-02-02 VITALS
DIASTOLIC BLOOD PRESSURE: 76 MMHG | SYSTOLIC BLOOD PRESSURE: 110 MMHG | TEMPERATURE: 97.8 F | HEIGHT: 71 IN | WEIGHT: 203 LBS | BODY MASS INDEX: 28.42 KG/M2

## 2021-02-02 DIAGNOSIS — I25.10 CORONARY ARTERY DISEASE INVOLVING NATIVE CORONARY ARTERY OF NATIVE HEART WITHOUT ANGINA PECTORIS: Primary | ICD-10-CM

## 2021-02-02 DIAGNOSIS — I10 ESSENTIAL HYPERTENSION: Chronic | ICD-10-CM

## 2021-02-02 DIAGNOSIS — Z12.5 SCREENING FOR PROSTATE CANCER: ICD-10-CM

## 2021-02-02 DIAGNOSIS — E78.49 OTHER HYPERLIPIDEMIA: Chronic | ICD-10-CM

## 2021-02-02 PROCEDURE — 1111F DSCHRG MED/CURRENT MED MERGE: CPT | Performed by: INTERNAL MEDICINE

## 2021-02-02 PROCEDURE — 99496 TRANSJ CARE MGMT HIGH F2F 7D: CPT | Performed by: INTERNAL MEDICINE

## 2021-02-02 RX ORDER — CHLORAL HYDRATE 500 MG
1200 CAPSULE ORAL DAILY
COMMUNITY

## 2021-02-02 RX ORDER — ATORVASTATIN CALCIUM 80 MG/1
80 TABLET, FILM COATED ORAL DAILY
Qty: 90 TABLET | Refills: 3
Start: 2021-02-02 | End: 2021-02-22

## 2021-02-02 NOTE — PROGRESS NOTES
Transitional Care Follow Up Visit  Subjective     Kenn Brito is a 80 y.o. male who presents for a transitional care management visit.    Within 48 business hours after discharge our office contacted him via telephone to coordinate his care and needs.      I reviewed and discussed the details of that call along with the discharge summary, hospital problems, inpatient lab results, inpatient diagnostic studies, and consultation reports with Kenn.     Current outpatient and discharge medications have been reconciled for the patient.  Reviewed by: Dante Tabor MD      Date of TCM Phone Call 1/29/2021   [facility]   Date of Admission 1/28/2021   Date of Discharge 1/29/2021   Discharge Disposition Home or Self Care     Risk for Readmission (LACE) Score: 1 (1/29/2021  6:00 AM)      History of Present Illness He was seen by Dr. Wright for PVCs. He had a nuclear stress test which was abnormal. He had a heart cath and stent placement in his left main and RCA. He spent one night in the hospital. He denies chest pain, dyspnea or palpitations. He does not feel any different. He was started on Plavix. He had his Lipitor decreased b/c he was told that his chol was too low. He has not felt dizzy or light headed. He was sent home with a 3 week holter monitor.   Course During Hospital Stay:  [facility]       The following portions of the patient's history were reviewed and updated as appropriate: allergies, current medications, past family history, past medical history, past social history, past surgical history and problem list.    Review of Systems  /76  Objective   Physical Exam  Vitals signs reviewed.   Constitutional:       Appearance: He is well-developed.   HENT:      Head: Normocephalic and atraumatic.   Cardiovascular:      Rate and Rhythm: Normal rate and regular rhythm.      Heart sounds: Normal heart sounds, S1 normal and S2 normal.   Pulmonary:      Effort: Pulmonary effort is  normal.      Breath sounds: Normal breath sounds.   Skin:     General: Skin is warm.   Neurological:      Mental Status: He is alert.   Psychiatric:         Behavior: Behavior normal.         Assessment/Plan   Diagnoses and all orders for this visit:    1. Coronary artery disease involving native coronary artery of native heart without angina pectoris (Primary)    2. Essential hypertension  -     CBC & Differential; Future    3. Other hyperlipidemia  -     atorvastatin (Lipitor) 80 MG tablet; Take 1 tablet by mouth Daily.  Dispense: 90 tablet; Refill: 3  -     Comprehensive Metabolic Panel; Future  -     Lipid Panel With / Chol / HDL Ratio; Future    4. Screening for prostate cancer  -     PSA Screen; Future             Reviewed stress test, cath, labs, and DC summary. I think it is fine to be on 80 mg of lipitor so he will go back to that dose. He is aware to continue the aspirin with plavix.

## 2021-02-05 ENCOUNTER — OFFICE VISIT (OUTPATIENT)
Dept: CARDIOLOGY | Facility: CLINIC | Age: 81
End: 2021-02-05

## 2021-02-05 VITALS
SYSTOLIC BLOOD PRESSURE: 118 MMHG | DIASTOLIC BLOOD PRESSURE: 74 MMHG | BODY MASS INDEX: 28.42 KG/M2 | OXYGEN SATURATION: 96 % | HEART RATE: 60 BPM | HEIGHT: 71 IN | WEIGHT: 203 LBS

## 2021-02-05 DIAGNOSIS — I25.10 CORONARY ARTERY DISEASE INVOLVING NATIVE CORONARY ARTERY OF NATIVE HEART WITHOUT ANGINA PECTORIS: Primary | ICD-10-CM

## 2021-02-05 DIAGNOSIS — Z95.1 HX OF CABG: ICD-10-CM

## 2021-02-05 DIAGNOSIS — I49.3 PVC (PREMATURE VENTRICULAR CONTRACTION): ICD-10-CM

## 2021-02-05 DIAGNOSIS — I10 ESSENTIAL HYPERTENSION: ICD-10-CM

## 2021-02-05 DIAGNOSIS — R06.09 DYSPNEA ON EXERTION: ICD-10-CM

## 2021-02-05 PROCEDURE — 99214 OFFICE O/P EST MOD 30 MIN: CPT | Performed by: NURSE PRACTITIONER

## 2021-02-05 NOTE — PROGRESS NOTES
CARDIOLOGY        Patient Name: Kenn Brito  :1940  Age: 80 y.o.  Primary Cardiologist: Sam Wright MD  Encounter Provider:  FALLON Liu    Date of Service: 21          CHIEF COMPLAINT / REASON FOR OFFICE VISIT     Hospital Follow Up Visit (Hx of cabg / BHE 1wk f/u)      HISTORY OF PRESENT ILLNESS       HPI  Kenn Brito is a 80 y.o. male who presents today for hospital follow-up.     Pt has a  history significant for PVCs, CAD history of CABG, hyperlipidemia, EVELINA on CPAP.    Review of medical records reveals patient hospitalized -2020.  Patient was seen in the office and had an abnormal stress test and underwent coronary angiography and was found to have a widely patent LIMA, SVG to diagonal occluded, SVG to OM1 occluded, SVG to RCA is widely patent and at the distal portion 70-80% lesion.  Patient underwent successful multivessel RANCHO at the origin of the left main into the saphenous vein graft to the RCA.    Patient reports since being released from the hospital he has done well.  He is no longer experiencing any dyspnea with exertion.  He was able to walk the mall today without any limiting symptoms.  He denies any chest discomfort, shortness of breath at rest, shortness of breath with exertion, palpitations, lightheadedness, lower extremity edema, fatigue.  He is concerned about the recommendation and decreasing his Lipitor.  He states that in the past when he was on Lipitor 40 his cholesterol levels were not controlled.  He has spoken with his family physician about this who recommended patient continue with Lipitor 80 mg/day.      The following portions of the patient's history were reviewed and updated as appropriate: allergies, current medications, past family history, past medical history, past social history, past surgical history and problem list.      VITAL SIGNS     There were no vitals taken for this visit.      Wt Readings from Last 3 Encounters:    02/02/21 92.1 kg (203 lb)   01/28/21 92.5 kg (204 lb)   01/12/21 92.5 kg (204 lb)     There is no height or weight on file to calculate BMI.      REVIEW OF SYSTEMS   Review of Systems   Constitution: Negative for chills, fever, weight gain and weight loss.   Cardiovascular: Negative for leg swelling.   Respiratory: Negative for cough, snoring and wheezing.    Hematologic/Lymphatic: Negative for bleeding problem. Does not bruise/bleed easily.   Skin: Negative for color change.   Musculoskeletal: Negative for falls, joint pain and myalgias.   Gastrointestinal: Negative for melena.   Genitourinary: Negative for hematuria.   Neurological: Negative for excessive daytime sleepiness.   Psychiatric/Behavioral: Negative for depression. The patient is not nervous/anxious.            PHYSICAL EXAMINATION     Constitutional:       Appearance: Normal appearance. Well-developed.   Eyes:      Conjunctiva/sclera: Conjunctivae normal.   Neck:      Vascular: No carotid bruit.   Pulmonary:      Effort: Pulmonary effort is normal.      Breath sounds: Normal breath sounds.   Cardiovascular:      Normal rate. Regular rhythm. Normal S1. Normal S2.      Murmurs: There is no murmur.      No gallop. No click. No rub.   Musculoskeletal: Normal range of motion.      Comments: No pedal edema   Skin:     General: Skin is warm and dry.   Neurological:      Mental Status: Alert and oriented to person, place, and time.      GCS: GCS eye subscore is 4. GCS verbal subscore is 5. GCS motor subscore is 6.   Psychiatric:         Speech: Speech normal.         Behavior: Behavior normal.         Thought Content: Thought content normal.         Judgment: Judgment normal.           REVIEWED DATA     Procedures    Cardiac Procedures:  1. Myocardial perfusion PET stress/12/2021.  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  Additional medium sized, moderate to severe area of ischemia in the apex.  Impressions consistent with  high risk study.  2. Cardiac catheterization 1/28/2021.  Left main 70% origin lesion with dampening of the catheter.  LAD 10-20% proximal disease, normal at midportion and distally there is first diagonal that is occluded.  Ramus is a large vessel with 30-40% disease.  Circumflex a small vessel with 70-80% lesion in the midportion not amenable to PCI.  RCA is dominant vessel 100% occluded.  LIMA widely patent not used his bypass.  SVG to diagonal was occluded.  SVG to OM1 is occluded.  SVG to RCA is widely patent but the distal portion there is a 70-80% degenerated lesion, native RCA has some luminal irregularities.  Patient treated with PCI and RANCHO.    Lipid Panel    Lipid Panel 5/15/20 1/29/21   Total Cholesterol 111    Triglycerides 107 98   HDL Cholesterol 36 (A) 30 (A)   VLDL Cholesterol 21.4 19   LDL Cholesterol  54 26   LDL/HDL Ratio  0.85   (A) Abnormal value                ASSESSMENT & PLAN      Diagnosis Plan   1. Coronary artery disease involving native coronary artery of native heart without angina pectoris     2. Hx of CABG     3. Essential hypertension     4. PVC (premature ventricular contraction)     5. Dyspnea on exertion           SUMMARY/DISCUSSION  1. CAD with history of CABG.  Patient recently was experiencing dyspnea with exertion and he underwent echocardiogram as well as stress testing by his PCP.  Stress testing was abnormal.  Patient was subsequently sent for cardiac catheterization where patient had complex PCI with RANCHO to the left main to the RCA graft.  Patient was hospitalized overnight and then discharged the next day.  During hospitalization it was recommended that his Lipitor be decreased to 40 mg/day for low cholesterol levels.  Patient states that when he has only taken 40 mg of Lipitor in the past his cholesterol is not controlled.  He did discuss this with primary care physician who recommended continuing Lipitor 80 mg/day.  Patient will also continue DAPT with aspirin and  Plavix.  He is on long-acting nitrates with Imdur as well as Ranexa.  Patient and I did discuss cardiac rehab during appointment and he states that he does not wish to participate.  Benefits of cardiac rehab were explained to the patient.  2. Hypertension.  Blood pressure currently controlled at 118/74.  Can continue atenolol 25 mg/day, furosemide 20 mg/day, losartan 50 mg/day.  3. PVCs.  Currently denies palpitations.  Continue beta-blocker therapy.  4. Dyspnea with exertion.  Resolved after stenting.  5. Follow-up with Dr. Wright in 3 months.  Sooner for problems or complications.        MEDICATIONS         Discharge Medications          Accurate as of February 5, 2021  1:07 PM. If you have any questions, ask your nurse or doctor.            Continue These Medications      Instructions Start Date   aspirin 81 MG EC tablet   81 mg, Oral, Daily      atenolol 25 MG tablet  Commonly known as: TENORMIN   25 mg, Oral, Daily      atorvastatin 80 MG tablet  Commonly known as: Lipitor   80 mg, Oral, Daily      clopidogrel 75 MG tablet  Commonly known as: PLAVIX   75 mg, Oral, Daily      furosemide 20 MG tablet  Commonly known as: LASIX   20 mg, Oral, Daily      isosorbide mononitrate 30 MG 24 hr tablet  Commonly known as: IMDUR   TAKE 1 TABLET BY MOUTH DAILY      losartan 50 MG tablet  Commonly known as: COZAAR   50 mg, Oral, Daily      nitroglycerin 0.4 MG SL tablet  Commonly known as: NITROSTAT   0.4 mg, Sublingual, As Needed      Omega-3 1000 MG capsule   1,000 mg, Oral, Daily      pantoprazole 40 MG EC tablet  Commonly known as: Protonix   40 mg, Oral, Daily      ranolazine 1000 MG 12 hr tablet  Commonly known as: RANEXA   TAKE 1 TABLET BY MOUTH EVERY 12 HOURS      Vitamin D3 50 MCG (2000 UT) tablet   Oral                 **Dragon Disclaimer:   Much of this encounter note is an electronic transcription/translation of spoken language to printed text. The electronic translation of spoken language may permit erroneous,  or at times, nonsensical words or phrases to be inadvertently transcribed. Although I have reviewed the note for such errors, some may still exist.

## 2021-02-22 DIAGNOSIS — E78.49 OTHER HYPERLIPIDEMIA: Chronic | ICD-10-CM

## 2021-02-22 RX ORDER — ATORVASTATIN CALCIUM 80 MG/1
TABLET, FILM COATED ORAL
Qty: 90 TABLET | Refills: 3 | Status: SHIPPED | OUTPATIENT
Start: 2021-02-22 | End: 2022-02-11

## 2021-02-24 PROCEDURE — 93248 EXT ECG>7D<15D REV&INTERPJ: CPT | Performed by: INTERNAL MEDICINE

## 2021-03-02 ENCOUNTER — OFFICE VISIT (OUTPATIENT)
Dept: CARDIOLOGY | Facility: CLINIC | Age: 81
End: 2021-03-02

## 2021-03-02 VITALS
HEIGHT: 71 IN | RESPIRATION RATE: 16 BRPM | OXYGEN SATURATION: 99 % | HEART RATE: 54 BPM | DIASTOLIC BLOOD PRESSURE: 82 MMHG | WEIGHT: 202 LBS | BODY MASS INDEX: 28.28 KG/M2 | SYSTOLIC BLOOD PRESSURE: 130 MMHG

## 2021-03-02 DIAGNOSIS — I10 ESSENTIAL HYPERTENSION: Chronic | ICD-10-CM

## 2021-03-02 DIAGNOSIS — I25.10 CORONARY ARTERY DISEASE INVOLVING NATIVE CORONARY ARTERY OF NATIVE HEART WITHOUT ANGINA PECTORIS: Primary | ICD-10-CM

## 2021-03-02 DIAGNOSIS — I45.5 SINUS PAUSE: ICD-10-CM

## 2021-03-02 DIAGNOSIS — I49.3 PVC (PREMATURE VENTRICULAR CONTRACTION): ICD-10-CM

## 2021-03-02 DIAGNOSIS — Z95.1 HX OF CABG: ICD-10-CM

## 2021-03-02 PROCEDURE — 99215 OFFICE O/P EST HI 40 MIN: CPT | Performed by: INTERNAL MEDICINE

## 2021-03-04 ENCOUNTER — TELEPHONE (OUTPATIENT)
Dept: CARDIAC REHAB | Facility: HOSPITAL | Age: 81
End: 2021-03-04

## 2021-03-04 PROBLEM — I45.5 SINUS PAUSE: Status: ACTIVE | Noted: 2021-03-04

## 2021-03-04 NOTE — PROGRESS NOTES
"      CARDIOLOGY    Sam Wright MD    ENCOUNTER DATE:  03/02/2021    Kenn Brito / 80 y.o. / male        CHIEF COMPLAINT / REASON FOR OFFICE VISIT     Follow-up (1 mth )  Coronary artery disease status post coronary artery bypass grafting.  pVC  Hypertension  Sinus pauses.    HISTORY OF PRESENT ILLNESS       HPI  Kenn Brito is a 80 y.o. male who presents today for reevaluation.  Patient underwent a cardiac catheterization and has revascularization.  Patient is doing better.  Most of the symptoms have resolved.  He denies chest pain shortness of breath palpitations lightheadedness swelling or fatigue.  After his cath he did have a near syncopal episode so a monitor was placed.  Patient's monitor showed a 5.4-second pause while he was asleep.  Ever since he was revascularized he is actually been doing very well.  He has not had any further syncopal or near syncopal episodes.  With the knowledge of his monitor I cut his atenolol in half.      The following portions of the patient's history were reviewed and updated as appropriate: allergies, current medications, past family history, past medical history, past social history, past surgical history and problem list.      VITAL SIGNS     Visit Vitals  /82   Pulse 54   Resp 16   Ht 180.3 cm (71\")   Wt 91.6 kg (202 lb)   SpO2 99%   BMI 28.17 kg/m²         Wt Readings from Last 3 Encounters:   03/02/21 91.6 kg (202 lb)   02/05/21 92.1 kg (203 lb)   02/02/21 92.1 kg (203 lb)     Body mass index is 28.17 kg/m².      REVIEW OF SYSTEMS   ROS        PHYSICAL EXAMINATION     Constitutional:       Appearance: Healthy appearance.   Pulmonary:      Effort: Pulmonary effort is normal.      Breath sounds: Normal breath sounds.   Cardiovascular:      PMI at left midclavicular line. Normal rate. Regular rhythm. Normal S1. Normal S2.      Murmurs: There is no murmur.      No gallop. No click. No rub.   Pulses:     Intact distal pulses.   Edema:     Peripheral edema " absent.           REVIEWED DATA     Procedures    Cardiac Procedures:  CORONARY ANGIOGRAPHY:1/28/2021  Left main:70% origin lesion with damping of the catheter.  Densely calcified plaque in the superior surface of the left main  Left anterior descending: 10 to 20% proximal disease normal in the midportion and distally there is a first diagonal that is occluded and seen small  Ramus intermedius: Large ramus with about 30 to 40% disease in it  Circumflex: Small vessel with a 70-80% lesion in the midportion not amenable to PCI  RCA: Is a dominant vessel.  100% occluded proximally     LIMA is widely patent and not used as a bypass     SVG to diagonal is occluded     SVG to OM1 is occluded     SVG to RCA is widely patent and in the distal portion is a 70 to 80% degenerated lesion native RCA has some luminal irregularities     SUMMARY: New shortness of breath abnormal stress test new disease in the origin left main and in the vein graft to the RCA       Holter monitor 2/24/2021   patient diary was not submitted.No symptoms reported during the monitoring period. No complications noted. The predominant rhythm noted during the testing period was sinus rhythm. Premature atrial contractions occured occasionally. 5.  Nonsustained episodes of supraventricular tachycardia, the longest lasting 6 beats, and the fastest with a rate of 133 bpm. Premature ventricular contractions occured frequently. Ventricular couplets, bigeminy and trigeminy. There was a 4 view episode of nonsustained ventricular tachycardia with a rate of 141 bpm. Pauses were noted in the sinoatrial node. There were 4 pauses. Longest pause lasted 5.3 seconds.  All of these pauses occurred in the early morning hours of 2/8/2021 around 0449.    Lipid Panel    Lipid Panel 5/15/20 1/29/21   Total Cholesterol  75   Total Cholesterol 111    Triglycerides 107 98   HDL Cholesterol 36 (A) 30 (A)   VLDL Cholesterol 21.4 19   LDL Cholesterol  54 26   LDL/HDL Ratio  0.85   (A)  Abnormal value                ASSESSMENT & PLAN      Diagnosis Plan   1. Coronary artery disease involving native coronary artery of native heart without angina pectoris     2. PVC (premature ventricular contraction)     3. Essential hypertension     4. Hx of CABG     5. Sinus pause           SUMMARY/DISCUSSION  1. Coronary disease status post coronary artery bypass grafting.  Patient had a intervention as described above.  Clinically he is doing significantly better and feeling good.  2. Holter monitor showed a 5.3-second pause but patient is asymptomatic.  Cut his beta-blocker in half after I found this and he presents today doing well.  His blood pressure is fine but his heart rate is still low at 54 bpm.  My that I am actually going to stop his atenolol altogether.  I did briefly discuss his case with Dr. Quevedo who again reconfirmed that if patient is not symptomatic there is no indication for pacemaker at this time.  3. Patient to be enrolled in cardiac rehab we can follow his rhythms and his blood pressure through that process.  Again I told him to stop his atenolol.  4. PVCs we will see if these worsen off the beta-blocker.  5. We will reassess in 6 to 12 months sooner course if he has issues with his blood pressure goes up.    Total time spent was 42 minutes this includes reviewing case with Dr. Quevedo.    MEDICATIONS         Discharge Medications          Accurate as of March 2, 2021 11:59 PM. If you have any questions, ask your nurse or doctor.            Continue These Medications      Instructions Start Date   aspirin 81 MG EC tablet   81 mg, Oral, Daily      atorvastatin 80 MG tablet  Commonly known as: LIPITOR   TAKE 1 TABLET BY MOUTH EVERY DAY      clopidogrel 75 MG tablet  Commonly known as: PLAVIX   75 mg, Oral, Daily      furosemide 20 MG tablet  Commonly known as: LASIX   20 mg, Oral, Daily      isosorbide mononitrate 30 MG 24 hr tablet  Commonly known as: IMDUR   TAKE 1 TABLET BY MOUTH DAILY       losartan 50 MG tablet  Commonly known as: COZAAR   50 mg, Oral, Daily      nitroglycerin 0.4 MG SL tablet  Commonly known as: NITROSTAT   0.4 mg, Sublingual, As Needed      Omega-3 1000 MG capsule   1,000 mg, Oral, Daily      pantoprazole 40 MG EC tablet  Commonly known as: Protonix   40 mg, Oral, Daily      ranolazine 1000 MG 12 hr tablet  Commonly known as: RANEXA   TAKE 1 TABLET BY MOUTH EVERY 12 HOURS      Vitamin D3 50 MCG (2000 UT) tablet   Oral         Stop These Medications    atenolol 25 MG tablet  Commonly known as: TENORMIN                **[employer] Disclaimer:   Much of this encounter note is an electronic transcription/translation of spoken language to printed text. The electronic translation of spoken language may permit erroneous, or at times, nonsensical words or phrases to be inadvertently transcribed. Although I have reviewed the note for such errors, some may still exist.

## 2021-03-06 DIAGNOSIS — I10 ESSENTIAL HYPERTENSION: ICD-10-CM

## 2021-03-08 RX ORDER — FUROSEMIDE 20 MG/1
20 TABLET ORAL DAILY
Qty: 90 TABLET | Refills: 1 | Status: SHIPPED | OUTPATIENT
Start: 2021-03-08 | End: 2021-06-17

## 2021-04-07 ENCOUNTER — OFFICE VISIT (OUTPATIENT)
Dept: CARDIOLOGY | Facility: CLINIC | Age: 81
End: 2021-04-07

## 2021-04-07 VITALS
HEART RATE: 67 BPM | HEIGHT: 71 IN | OXYGEN SATURATION: 99 % | WEIGHT: 203 LBS | BODY MASS INDEX: 28.42 KG/M2 | SYSTOLIC BLOOD PRESSURE: 130 MMHG | DIASTOLIC BLOOD PRESSURE: 86 MMHG

## 2021-04-07 DIAGNOSIS — I10 ESSENTIAL HYPERTENSION: ICD-10-CM

## 2021-04-07 DIAGNOSIS — I25.10 CORONARY ARTERY DISEASE INVOLVING NATIVE CORONARY ARTERY OF NATIVE HEART WITHOUT ANGINA PECTORIS: ICD-10-CM

## 2021-04-07 DIAGNOSIS — I49.3 PVC (PREMATURE VENTRICULAR CONTRACTION): ICD-10-CM

## 2021-04-07 DIAGNOSIS — I45.5 SINUS PAUSE: Primary | ICD-10-CM

## 2021-04-07 PROCEDURE — 99214 OFFICE O/P EST MOD 30 MIN: CPT | Performed by: NURSE PRACTITIONER

## 2021-04-07 NOTE — PROGRESS NOTES
"    CARDIOLOGY        Patient Name: Kenn Brito  :1940  Age: 80 y.o.  Primary Cardiologist: Sam Wright MD  Encounter Provider:  FALLON Liu    Date of Service: 21          CHIEF COMPLAINT / REASON FOR OFFICE VISIT     Coronary Artery Disease (1 month f/u )      HISTORY OF PRESENT ILLNESS       Coronary Artery Disease  Pertinent negatives include no leg swelling or weight gain.     Kenn Brito is a 80 y.o. male who presents today for hospital follow-up.     Pt has a  history significant for PVCs, CAD history of CABG, hyperlipidemia, EVELINA on CPAP.    Review of medical records reveals patient hospitalized -2020.  Patient was seen in the office and had an abnormal stress test and underwent coronary angiography and was found to have a widely patent LIMA, SVG to diagonal occluded, SVG to OM1 occluded, SVG to RCA is widely patent and at the distal portion 70-80% lesion.  Patient underwent successful multivessel RANCHO at the origin of the left main into the saphenous vein graft to the RCA.    Patient recently had a zio monitor that revealed 4 sinus pauses with the longest lasting 5.3 seconds. As a result the BB was stopped.  Patient does not recall having any episodes of lightheadedness or dizziness.  He has not noted any high or low episodes of HR.  He states that he is overall feeling well.  He is exercising and denies rate limiting symptoms.  He denies any episodes of chest pain, shortness of breath, palpitations, lightheadedness, swelling or fatigue.      The following portions of the patient's history were reviewed and updated as appropriate: allergies, current medications, past family history, past medical history, past social history, past surgical history and problem list.      VITAL SIGNS     Visit Vitals  /86 (BP Location: Right arm, Patient Position: Sitting, Cuff Size: Adult)   Pulse 67   Ht 180.3 cm (71\")   Wt 92.1 kg (203 lb)   SpO2 99%   BMI 28.31 kg/m² "         Wt Readings from Last 3 Encounters:   04/07/21 92.1 kg (203 lb)   03/02/21 91.6 kg (202 lb)   02/05/21 92.1 kg (203 lb)     Body mass index is 28.31 kg/m².      REVIEW OF SYSTEMS   Review of Systems   Constitutional: Negative for chills, fever, weight gain and weight loss.   Cardiovascular: Negative for leg swelling.   Respiratory: Negative for cough, snoring and wheezing.    Hematologic/Lymphatic: Negative for bleeding problem. Does not bruise/bleed easily.   Skin: Negative for color change.   Musculoskeletal: Negative for falls, joint pain and myalgias.   Gastrointestinal: Negative for melena.   Genitourinary: Negative for hematuria.   Neurological: Negative for excessive daytime sleepiness.   Psychiatric/Behavioral: Negative for depression. The patient is not nervous/anxious.            PHYSICAL EXAMINATION     Constitutional:       Appearance: Normal appearance. Well-developed.   Eyes:      Conjunctiva/sclera: Conjunctivae normal.   Neck:      Vascular: No carotid bruit.   Pulmonary:      Effort: Pulmonary effort is normal.      Breath sounds: Normal breath sounds.   Cardiovascular:      Normal rate. Regular rhythm. Normal S1. Normal S2.      Murmurs: There is no murmur.      No gallop. No click. No rub.   Musculoskeletal: Normal range of motion.      Comments: No pedal edema Skin:     General: Skin is warm and dry.   Neurological:      Mental Status: Alert and oriented to person, place, and time.      GCS: GCS eye subscore is 4. GCS verbal subscore is 5. GCS motor subscore is 6.   Psychiatric:         Speech: Speech normal.         Behavior: Behavior normal.         Thought Content: Thought content normal.         Judgment: Judgment normal.           REVIEWED DATA     Procedures    Cardiac Procedures:  1. Myocardial perfusion PET stress/12/2021.  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  Additional medium sized, moderate to severe area of ischemia in the apex.   Impressions consistent with high risk study.  2. Cardiac catheterization 1/28/2021.  Left main 70% origin lesion with dampening of the catheter.  LAD 10-20% proximal disease, normal at midportion and distally there is first diagonal that is occluded.  Ramus is a large vessel with 30-40% disease.  Circumflex a small vessel with 70-80% lesion in the midportion not amenable to PCI.  RCA is dominant vessel 100% occluded.  LIMA widely patent not used his bypass.  SVG to diagonal was occluded.  SVG to OM1 is occluded.  SVG to RCA is widely patent but the distal portion there is a 70-80% degenerated lesion, native RCA has some luminal irregularities.  Patient treated with PCI and RANCHO.    Lipid Panel    Lipid Panel 5/15/20 1/29/21   Total Cholesterol  75   Total Cholesterol 111    Triglycerides 107 98   HDL Cholesterol 36 (A) 30 (A)   VLDL Cholesterol 21.4 19   LDL Cholesterol  54 26   LDL/HDL Ratio  0.85   (A) Abnormal value                ASSESSMENT & PLAN      Diagnosis Plan   1. Sinus pause     2. Coronary artery disease involving native coronary artery of native heart without angina pectoris     3. Essential hypertension     4. PVC (premature ventricular contraction)           SUMMARY/DISCUSSION  1. Sinus pause. Patient had a sinus pause of 5.3 seconds noted on a ZIO monitor. Patient asymptomatic. Beta-blocker therapy was subsequently stopped. Patient reports that he remains asymptomatic and denies any episodes of lightheadedness, dizziness, near syncope. Heart rate has remained stable and he has not been tachycardic. Denies any palpitations.  2. CAD with history of CABG. denies any angina, dyspnea. Patient will continue guideline directed therapy with aspirin, atorvastatin, clopidogrel, Imdur, losartan, Ranexa.  3. Hypertension. Blood pressure currently controlled at 130/86. Patient will continue losartan 50 mg/day.  4. PVCs.  Currently denies palpitations.  BB was stopped secondary to sinus pauses.   5. Follow-up  with Dr. Wright in four-6 months.  Sooner for problems or complications.        MEDICATIONS         Discharge Medications          Accurate as of April 7, 2021  9:51 AM. If you have any questions, ask your nurse or doctor.            Continue These Medications      Instructions Start Date   aspirin 81 MG EC tablet   81 mg, Oral, Daily      atorvastatin 80 MG tablet  Commonly known as: LIPITOR   TAKE 1 TABLET BY MOUTH EVERY DAY      clopidogrel 75 MG tablet  Commonly known as: PLAVIX   75 mg, Oral, Daily      furosemide 20 MG tablet  Commonly known as: LASIX   20 mg, Oral, Daily      isosorbide mononitrate 30 MG 24 hr tablet  Commonly known as: IMDUR   TAKE 1 TABLET BY MOUTH DAILY      losartan 50 MG tablet  Commonly known as: COZAAR   50 mg, Oral, Daily      nitroglycerin 0.4 MG SL tablet  Commonly known as: NITROSTAT   0.4 mg, Sublingual, As Needed      Omega-3 1000 MG capsule   1,000 mg, Oral, Daily      pantoprazole 40 MG EC tablet  Commonly known as: Protonix   40 mg, Oral, Daily      ranolazine 1000 MG 12 hr tablet  Commonly known as: RANEXA   TAKE 1 TABLET BY MOUTH EVERY 12 HOURS      Vitamin D3 50 MCG (2000 UT) tablet   Oral                 **Dragon Disclaimer:   Much of this encounter note is an electronic transcription/translation of spoken language to printed text. The electronic translation of spoken language may permit erroneous, or at times, nonsensical words or phrases to be inadvertently transcribed. Although I have reviewed the note for such errors, some may still exist.

## 2021-05-04 ENCOUNTER — TELEPHONE (OUTPATIENT)
Dept: CARDIOLOGY | Facility: CLINIC | Age: 81
End: 2021-05-04

## 2021-05-04 DIAGNOSIS — I51.9 HEART DISEASE: ICD-10-CM

## 2021-05-04 RX ORDER — RANOLAZINE 1000 MG/1
TABLET, EXTENDED RELEASE ORAL
Qty: 180 TABLET | Refills: 3 | Status: SHIPPED | OUTPATIENT
Start: 2021-05-04 | End: 2022-04-25

## 2021-05-04 NOTE — TELEPHONE ENCOUNTER
Jesus Caceres is calling in today reporting SOA with exertion.  He denies edema, denies chest pain, denies wt gain, denies feeling dizzy or lightheaded.    You just saw him last month and he said these symptoms are new since then.    He is not checking hr or bp.  He is having pain to both shoulders, but this is not new.    Cardiac meds  furosemide 20mg daily  Clopidogrel  Ranolazine 1000mg BID  Isosorbide mono 30mg daily  Losartan 50mg daily    Please advise on how to proceed  Thanks  Samina Alatorre RN  Triage nurse

## 2021-05-04 NOTE — TELEPHONE ENCOUNTER
Please have him double furosemide for the next 2 days and let me know if that improves his symptoms.  If it does not improve his symptoms we will need to get him into the office for reevaluation.

## 2021-05-11 ENCOUNTER — HOSPITAL ENCOUNTER (OUTPATIENT)
Facility: HOSPITAL | Age: 81
Discharge: HOME | End: 2021-05-12
Attending: EMERGENCY MEDICINE | Admitting: INTERNAL MEDICINE

## 2021-05-11 ENCOUNTER — APPOINTMENT (OUTPATIENT)
Dept: GENERAL RADIOLOGY | Facility: HOSPITAL | Age: 81
End: 2021-05-11

## 2021-05-11 DIAGNOSIS — R07.9 ACUTE CHEST PAIN: Primary | ICD-10-CM

## 2021-05-11 DIAGNOSIS — R77.8 ELEVATED TROPONIN: ICD-10-CM

## 2021-05-11 DIAGNOSIS — R79.89 ELEVATED SERUM CREATININE: ICD-10-CM

## 2021-05-11 LAB
ALBUMIN SERPL-MCNC: 4.4 G/DL (ref 3.5–5.2)
ALBUMIN/GLOB SERPL: 1.9 G/DL
ALP SERPL-CCNC: 67 U/L (ref 39–117)
ALT SERPL W P-5'-P-CCNC: 15 U/L (ref 1–41)
ANION GAP SERPL CALCULATED.3IONS-SCNC: 10 MMOL/L (ref 5–15)
APTT PPP: 23.9 SECONDS (ref 22.7–35.4)
AST SERPL-CCNC: 22 U/L (ref 1–40)
BASOPHILS # BLD AUTO: 0.05 10*3/MM3 (ref 0–0.2)
BASOPHILS NFR BLD AUTO: 0.6 % (ref 0–1.5)
BILIRUB SERPL-MCNC: 0.9 MG/DL (ref 0–1.2)
BUN SERPL-MCNC: 31 MG/DL (ref 8–23)
BUN/CREAT SERPL: 23.5 (ref 7–25)
CALCIUM SPEC-SCNC: 10.1 MG/DL (ref 8.6–10.5)
CHLORIDE SERPL-SCNC: 102 MMOL/L (ref 98–107)
CO2 SERPL-SCNC: 27 MMOL/L (ref 22–29)
CREAT SERPL-MCNC: 1.32 MG/DL (ref 0.76–1.27)
DEPRECATED RDW RBC AUTO: 40.9 FL (ref 37–54)
EOSINOPHIL # BLD AUTO: 0.03 10*3/MM3 (ref 0–0.4)
EOSINOPHIL NFR BLD AUTO: 0.4 % (ref 0.3–6.2)
ERYTHROCYTE [DISTWIDTH] IN BLOOD BY AUTOMATED COUNT: 12.2 % (ref 12.3–15.4)
GFR SERPL CREATININE-BSD FRML MDRD: 52 ML/MIN/1.73
GLOBULIN UR ELPH-MCNC: 2.3 GM/DL
GLUCOSE SERPL-MCNC: 108 MG/DL (ref 65–99)
HCT VFR BLD AUTO: 42 % (ref 37.5–51)
HGB BLD-MCNC: 14.3 G/DL (ref 13–17.7)
HOLD SPECIMEN: NORMAL
HOLD SPECIMEN: NORMAL
IMM GRANULOCYTES # BLD AUTO: 0.03 10*3/MM3 (ref 0–0.05)
IMM GRANULOCYTES NFR BLD AUTO: 0.4 % (ref 0–0.5)
INR PPP: 1.09 (ref 0.9–1.1)
LYMPHOCYTES # BLD AUTO: 1.13 10*3/MM3 (ref 0.7–3.1)
LYMPHOCYTES NFR BLD AUTO: 13.3 % (ref 19.6–45.3)
MCH RBC QN AUTO: 31.5 PG (ref 26.6–33)
MCHC RBC AUTO-ENTMCNC: 34 G/DL (ref 31.5–35.7)
MCV RBC AUTO: 92.5 FL (ref 79–97)
MONOCYTES # BLD AUTO: 0.88 10*3/MM3 (ref 0.1–0.9)
MONOCYTES NFR BLD AUTO: 10.3 % (ref 5–12)
NEUTROPHILS NFR BLD AUTO: 6.39 10*3/MM3 (ref 1.7–7)
NEUTROPHILS NFR BLD AUTO: 75 % (ref 42.7–76)
NRBC BLD AUTO-RTO: 0 /100 WBC (ref 0–0.2)
NT-PROBNP SERPL-MCNC: 197.7 PG/ML (ref 0–1800)
PLATELET # BLD AUTO: 183 10*3/MM3 (ref 140–450)
PMV BLD AUTO: 11 FL (ref 6–12)
POTASSIUM SERPL-SCNC: 4.1 MMOL/L (ref 3.5–5.2)
PROT SERPL-MCNC: 6.7 G/DL (ref 6–8.5)
PROTHROMBIN TIME: 13.9 SECONDS (ref 11.7–14.2)
QT INTERVAL: 454 MS
RBC # BLD AUTO: 4.54 10*6/MM3 (ref 4.14–5.8)
SARS-COV-2 RNA RESP QL NAA+PROBE: NOT DETECTED
SODIUM SERPL-SCNC: 139 MMOL/L (ref 136–145)
TROPONIN T SERPL-MCNC: 0.04 NG/ML (ref 0–0.03)
TROPONIN T SERPL-MCNC: 0.07 NG/ML (ref 0–0.03)
TROPONIN T SERPL-MCNC: 0.11 NG/ML (ref 0–0.03)
WBC # BLD AUTO: 8.51 10*3/MM3 (ref 3.4–10.8)
WHOLE BLOOD HOLD SPECIMEN: NORMAL
WHOLE BLOOD HOLD SPECIMEN: NORMAL

## 2021-05-11 PROCEDURE — 25010000002 MIDAZOLAM PER 1 MG: Performed by: INTERNAL MEDICINE

## 2021-05-11 PROCEDURE — 85610 PROTHROMBIN TIME: CPT | Performed by: EMERGENCY MEDICINE

## 2021-05-11 PROCEDURE — 93010 ELECTROCARDIOGRAM REPORT: CPT | Performed by: INTERNAL MEDICINE

## 2021-05-11 PROCEDURE — 93005 ELECTROCARDIOGRAM TRACING: CPT | Performed by: INTERNAL MEDICINE

## 2021-05-11 PROCEDURE — C9604 PERC D-E COR REVASC T CABG S: HCPCS | Performed by: INTERNAL MEDICINE

## 2021-05-11 PROCEDURE — 80053 COMPREHEN METABOLIC PANEL: CPT | Performed by: EMERGENCY MEDICINE

## 2021-05-11 PROCEDURE — C1894 INTRO/SHEATH, NON-LASER: HCPCS | Performed by: INTERNAL MEDICINE

## 2021-05-11 PROCEDURE — A9270 NON-COVERED ITEM OR SERVICE: HCPCS | Performed by: INTERNAL MEDICINE

## 2021-05-11 PROCEDURE — 25010000002 FENTANYL CITRATE (PF) 100 MCG/2ML SOLUTION: Performed by: INTERNAL MEDICINE

## 2021-05-11 PROCEDURE — G0378 HOSPITAL OBSERVATION PER HR: HCPCS

## 2021-05-11 PROCEDURE — 85730 THROMBOPLASTIN TIME PARTIAL: CPT | Performed by: EMERGENCY MEDICINE

## 2021-05-11 PROCEDURE — 84484 ASSAY OF TROPONIN QUANT: CPT | Performed by: INTERNAL MEDICINE

## 2021-05-11 PROCEDURE — 93005 ELECTROCARDIOGRAM TRACING: CPT | Performed by: EMERGENCY MEDICINE

## 2021-05-11 PROCEDURE — C1725 CATH, TRANSLUMIN NON-LASER: HCPCS | Performed by: INTERNAL MEDICINE

## 2021-05-11 PROCEDURE — 25010000002 BH (CUPID ONLY) ADENOSINE 6 MG/100ML MIXTURE: Performed by: INTERNAL MEDICINE

## 2021-05-11 PROCEDURE — C1769 GUIDE WIRE: HCPCS | Performed by: INTERNAL MEDICINE

## 2021-05-11 PROCEDURE — 63710000001 RANOLAZINE 500 MG TABLET SUSTAINED-RELEASE 12 HOUR: Performed by: INTERNAL MEDICINE

## 2021-05-11 PROCEDURE — 93455 CORONARY ART/GRFT ANGIO S&I: CPT | Performed by: INTERNAL MEDICINE

## 2021-05-11 PROCEDURE — C1874 STENT, COATED/COV W/DEL SYS: HCPCS | Performed by: INTERNAL MEDICINE

## 2021-05-11 PROCEDURE — C1887 CATHETER, GUIDING: HCPCS | Performed by: INTERNAL MEDICINE

## 2021-05-11 PROCEDURE — 85347 COAGULATION TIME ACTIVATED: CPT

## 2021-05-11 PROCEDURE — 99153 MOD SED SAME PHYS/QHP EA: CPT | Performed by: INTERNAL MEDICINE

## 2021-05-11 PROCEDURE — 25010000002 HEPARIN (PORCINE) PER 1000 UNITS: Performed by: INTERNAL MEDICINE

## 2021-05-11 PROCEDURE — 92937 PRQ TRLUML REVSC CAB GRF 1: CPT | Performed by: INTERNAL MEDICINE

## 2021-05-11 PROCEDURE — 63710000001 ISOSORBIDE MONONITRATE 30 MG TABLET SUSTAINED-RELEASE 24 HOUR: Performed by: INTERNAL MEDICINE

## 2021-05-11 PROCEDURE — 63710000001 ATORVASTATIN 80 MG TABLET: Performed by: INTERNAL MEDICINE

## 2021-05-11 PROCEDURE — 85025 COMPLETE CBC W/AUTO DIFF WBC: CPT | Performed by: EMERGENCY MEDICINE

## 2021-05-11 PROCEDURE — U0003 INFECTIOUS AGENT DETECTION BY NUCLEIC ACID (DNA OR RNA); SEVERE ACUTE RESPIRATORY SYNDROME CORONAVIRUS 2 (SARS-COV-2) (CORONAVIRUS DISEASE [COVID-19]), AMPLIFIED PROBE TECHNIQUE, MAKING USE OF HIGH THROUGHPUT TECHNOLOGIES AS DESCRIBED BY CMS-2020-01-R: HCPCS | Performed by: EMERGENCY MEDICINE

## 2021-05-11 PROCEDURE — 99152 MOD SED SAME PHYS/QHP 5/>YRS: CPT | Performed by: INTERNAL MEDICINE

## 2021-05-11 PROCEDURE — U0005 INFEC AGEN DETEC AMPLI PROBE: HCPCS | Performed by: EMERGENCY MEDICINE

## 2021-05-11 PROCEDURE — 99284 EMERGENCY DEPT VISIT MOD MDM: CPT

## 2021-05-11 PROCEDURE — 63710000001 NITROGLYCERIN 0.4 MG SUBLINGUAL TABLET 25 EACH BOTTLE: Performed by: INTERNAL MEDICINE

## 2021-05-11 PROCEDURE — 99220 PR INITIAL OBSERVATION CARE/DAY 70 MINUTES: CPT | Performed by: INTERNAL MEDICINE

## 2021-05-11 PROCEDURE — C9803 HOPD COVID-19 SPEC COLLECT: HCPCS

## 2021-05-11 PROCEDURE — 83880 ASSAY OF NATRIURETIC PEPTIDE: CPT | Performed by: EMERGENCY MEDICINE

## 2021-05-11 PROCEDURE — 0 IOPAMIDOL PER 1 ML: Performed by: INTERNAL MEDICINE

## 2021-05-11 PROCEDURE — 71045 X-RAY EXAM CHEST 1 VIEW: CPT

## 2021-05-11 PROCEDURE — 84484 ASSAY OF TROPONIN QUANT: CPT | Performed by: EMERGENCY MEDICINE

## 2021-05-11 PROCEDURE — 36415 COLL VENOUS BLD VENIPUNCTURE: CPT | Performed by: INTERNAL MEDICINE

## 2021-05-11 PROCEDURE — 63710000001 CLOPIDOGREL 75 MG TABLET: Performed by: INTERNAL MEDICINE

## 2021-05-11 DEVICE — XIENCE SIERRA™ EVEROLIMUS ELUTING CORONARY STENT SYSTEM 2.75 MM X 18 MM / RAPID-EXCHANGE
Type: IMPLANTABLE DEVICE | Status: FUNCTIONAL
Brand: XIENCE SIERRA™

## 2021-05-11 RX ORDER — NITROGLYCERIN 0.4 MG/1
0.4 TABLET SUBLINGUAL
Status: DISCONTINUED | OUTPATIENT
Start: 2021-05-11 | End: 2021-05-11 | Stop reason: SDUPTHER

## 2021-05-11 RX ORDER — SODIUM CHLORIDE 9 MG/ML
100 INJECTION, SOLUTION INTRAVENOUS CONTINUOUS
Status: DISCONTINUED | OUTPATIENT
Start: 2021-05-11 | End: 2021-05-12 | Stop reason: HOSPADM

## 2021-05-11 RX ORDER — HEPARIN SODIUM 1000 [USP'U]/ML
INJECTION, SOLUTION INTRAVENOUS; SUBCUTANEOUS AS NEEDED
Status: DISCONTINUED | OUTPATIENT
Start: 2021-05-11 | End: 2021-05-11 | Stop reason: HOSPADM

## 2021-05-11 RX ORDER — SODIUM CHLORIDE 0.9 % (FLUSH) 0.9 %
10 SYRINGE (ML) INJECTION AS NEEDED
Status: DISCONTINUED | OUTPATIENT
Start: 2021-05-11 | End: 2021-05-11

## 2021-05-11 RX ORDER — SODIUM CHLORIDE 0.9 % (FLUSH) 0.9 %
10 SYRINGE (ML) INJECTION AS NEEDED
Status: DISCONTINUED | OUTPATIENT
Start: 2021-05-11 | End: 2021-05-12 | Stop reason: HOSPADM

## 2021-05-11 RX ORDER — ONDANSETRON 4 MG/1
4 TABLET, FILM COATED ORAL EVERY 6 HOURS PRN
Status: DISCONTINUED | OUTPATIENT
Start: 2021-05-11 | End: 2021-05-12 | Stop reason: HOSPADM

## 2021-05-11 RX ORDER — ISOSORBIDE MONONITRATE 30 MG/1
30 TABLET, EXTENDED RELEASE ORAL NIGHTLY
Status: DISCONTINUED | OUTPATIENT
Start: 2021-05-11 | End: 2021-05-12 | Stop reason: HOSPADM

## 2021-05-11 RX ORDER — NITROGLYCERIN 0.4 MG/1
0.4 TABLET SUBLINGUAL
Status: DISCONTINUED | OUTPATIENT
Start: 2021-05-11 | End: 2021-05-11

## 2021-05-11 RX ORDER — ACETAMINOPHEN 325 MG/1
650 TABLET ORAL EVERY 6 HOURS PRN
Status: DISCONTINUED | OUTPATIENT
Start: 2021-05-11 | End: 2021-05-12 | Stop reason: SDUPTHER

## 2021-05-11 RX ORDER — HYDROMORPHONE HYDROCHLORIDE 1 MG/ML
0.25 INJECTION, SOLUTION INTRAMUSCULAR; INTRAVENOUS; SUBCUTANEOUS
Status: DISCONTINUED | OUTPATIENT
Start: 2021-05-11 | End: 2021-05-12 | Stop reason: HOSPADM

## 2021-05-11 RX ORDER — LOSARTAN POTASSIUM 50 MG/1
50 TABLET ORAL DAILY
Status: DISCONTINUED | OUTPATIENT
Start: 2021-05-11 | End: 2021-05-12 | Stop reason: HOSPADM

## 2021-05-11 RX ORDER — RANOLAZINE 500 MG/1
1000 TABLET, EXTENDED RELEASE ORAL EVERY 12 HOURS SCHEDULED
Status: DISCONTINUED | OUTPATIENT
Start: 2021-05-11 | End: 2021-05-12 | Stop reason: HOSPADM

## 2021-05-11 RX ORDER — SODIUM CHLORIDE 0.9 % (FLUSH) 0.9 %
10 SYRINGE (ML) INJECTION EVERY 12 HOURS SCHEDULED
Status: DISCONTINUED | OUTPATIENT
Start: 2021-05-11 | End: 2021-05-12 | Stop reason: HOSPADM

## 2021-05-11 RX ORDER — CLOPIDOGREL BISULFATE 75 MG/1
75 TABLET ORAL DAILY
Status: DISCONTINUED | OUTPATIENT
Start: 2021-05-11 | End: 2021-05-12 | Stop reason: HOSPADM

## 2021-05-11 RX ORDER — MORPHINE SULFATE 2 MG/ML
1 INJECTION, SOLUTION INTRAMUSCULAR; INTRAVENOUS EVERY 4 HOURS PRN
Status: DISCONTINUED | OUTPATIENT
Start: 2021-05-11 | End: 2021-05-12 | Stop reason: HOSPADM

## 2021-05-11 RX ORDER — MELATONIN
2000 DAILY
Status: DISCONTINUED | OUTPATIENT
Start: 2021-05-11 | End: 2021-05-12 | Stop reason: HOSPADM

## 2021-05-11 RX ORDER — ASPIRIN 81 MG/1
81 TABLET ORAL DAILY
Status: DISCONTINUED | OUTPATIENT
Start: 2021-05-11 | End: 2021-05-12 | Stop reason: HOSPADM

## 2021-05-11 RX ORDER — PANTOPRAZOLE SODIUM 40 MG/1
40 TABLET, DELAYED RELEASE ORAL DAILY
Status: DISCONTINUED | OUTPATIENT
Start: 2021-05-11 | End: 2021-05-12 | Stop reason: HOSPADM

## 2021-05-11 RX ORDER — CLOPIDOGREL BISULFATE 75 MG/1
TABLET ORAL AS NEEDED
Status: DISCONTINUED | OUTPATIENT
Start: 2021-05-11 | End: 2021-05-11 | Stop reason: HOSPADM

## 2021-05-11 RX ORDER — SODIUM CHLORIDE 9 MG/ML
50 INJECTION, SOLUTION INTRAVENOUS CONTINUOUS
Status: ACTIVE | OUTPATIENT
Start: 2021-05-11 | End: 2021-05-12

## 2021-05-11 RX ORDER — NALOXONE HCL 0.4 MG/ML
0.4 VIAL (ML) INJECTION
Status: DISCONTINUED | OUTPATIENT
Start: 2021-05-11 | End: 2021-05-12 | Stop reason: HOSPADM

## 2021-05-11 RX ORDER — NITROGLYCERIN 0.4 MG/1
0.4 TABLET SUBLINGUAL
Status: DISCONTINUED | OUTPATIENT
Start: 2021-05-11 | End: 2021-05-12 | Stop reason: HOSPADM

## 2021-05-11 RX ORDER — ONDANSETRON 2 MG/ML
4 INJECTION INTRAMUSCULAR; INTRAVENOUS EVERY 6 HOURS PRN
Status: DISCONTINUED | OUTPATIENT
Start: 2021-05-11 | End: 2021-05-12 | Stop reason: SDUPTHER

## 2021-05-11 RX ORDER — MIDAZOLAM HYDROCHLORIDE 1 MG/ML
INJECTION INTRAMUSCULAR; INTRAVENOUS AS NEEDED
Status: DISCONTINUED | OUTPATIENT
Start: 2021-05-11 | End: 2021-05-11 | Stop reason: HOSPADM

## 2021-05-11 RX ORDER — ATORVASTATIN CALCIUM 80 MG/1
80 TABLET, FILM COATED ORAL NIGHTLY
Status: DISCONTINUED | OUTPATIENT
Start: 2021-05-11 | End: 2021-05-12 | Stop reason: HOSPADM

## 2021-05-11 RX ORDER — LIDOCAINE HYDROCHLORIDE 20 MG/ML
INJECTION, SOLUTION INFILTRATION; PERINEURAL AS NEEDED
Status: DISCONTINUED | OUTPATIENT
Start: 2021-05-11 | End: 2021-05-11 | Stop reason: HOSPADM

## 2021-05-11 RX ORDER — ACETAMINOPHEN 325 MG/1
650 TABLET ORAL EVERY 4 HOURS PRN
Status: DISCONTINUED | OUTPATIENT
Start: 2021-05-11 | End: 2021-05-12 | Stop reason: HOSPADM

## 2021-05-11 RX ORDER — ASPIRIN 81 MG/1
162 TABLET, CHEWABLE ORAL ONCE
Status: COMPLETED | OUTPATIENT
Start: 2021-05-11 | End: 2021-05-11

## 2021-05-11 RX ORDER — FENTANYL CITRATE 50 UG/ML
INJECTION, SOLUTION INTRAMUSCULAR; INTRAVENOUS AS NEEDED
Status: DISCONTINUED | OUTPATIENT
Start: 2021-05-11 | End: 2021-05-11 | Stop reason: HOSPADM

## 2021-05-11 RX ORDER — ONDANSETRON 2 MG/ML
4 INJECTION INTRAMUSCULAR; INTRAVENOUS EVERY 6 HOURS PRN
Status: DISCONTINUED | OUTPATIENT
Start: 2021-05-11 | End: 2021-05-12 | Stop reason: HOSPADM

## 2021-05-11 RX ORDER — HYDROCODONE BITARTRATE AND ACETAMINOPHEN 5; 325 MG/1; MG/1
1 TABLET ORAL EVERY 4 HOURS PRN
Status: DISCONTINUED | OUTPATIENT
Start: 2021-05-11 | End: 2021-05-12 | Stop reason: HOSPADM

## 2021-05-11 RX ADMIN — ATORVASTATIN CALCIUM 80 MG: 80 TABLET, FILM COATED ORAL at 21:01

## 2021-05-11 RX ADMIN — SODIUM CHLORIDE, PRESERVATIVE FREE 10 ML: 5 INJECTION INTRAVENOUS at 21:02

## 2021-05-11 RX ADMIN — ISOSORBIDE MONONITRATE 30 MG: 30 TABLET ORAL at 22:27

## 2021-05-11 RX ADMIN — NITROGLYCERIN 0.4 MG: 0.4 TABLET, ORALLY DISINTEGRATING SUBLINGUAL at 12:59

## 2021-05-11 RX ADMIN — ASPIRIN 162 MG: 81 TABLET, CHEWABLE ORAL at 11:27

## 2021-05-11 RX ADMIN — RANOLAZINE 1000 MG: 500 TABLET, FILM COATED, EXTENDED RELEASE ORAL at 22:27

## 2021-05-12 ENCOUNTER — READMISSION MANAGEMENT (OUTPATIENT)
Dept: CALL CENTER | Facility: HOSPITAL | Age: 81
End: 2021-05-12

## 2021-05-12 VITALS
RESPIRATION RATE: 18 BRPM | SYSTOLIC BLOOD PRESSURE: 105 MMHG | TEMPERATURE: 98.6 F | OXYGEN SATURATION: 96 % | DIASTOLIC BLOOD PRESSURE: 62 MMHG | BODY MASS INDEX: 27.52 KG/M2 | HEIGHT: 71 IN | HEART RATE: 67 BPM | WEIGHT: 196.6 LBS

## 2021-05-12 LAB
ACT BLD: 279 SECONDS (ref 82–152)
ANION GAP SERPL CALCULATED.3IONS-SCNC: 10.2 MMOL/L (ref 5–15)
BUN SERPL-MCNC: 23 MG/DL (ref 8–23)
BUN/CREAT SERPL: 18.5 (ref 7–25)
CALCIUM SPEC-SCNC: 9.3 MG/DL (ref 8.6–10.5)
CHLORIDE SERPL-SCNC: 103 MMOL/L (ref 98–107)
CHOLEST SERPL-MCNC: 103 MG/DL (ref 0–200)
CO2 SERPL-SCNC: 22.8 MMOL/L (ref 22–29)
CREAT SERPL-MCNC: 1.24 MG/DL (ref 0.76–1.27)
DEPRECATED RDW RBC AUTO: 41.6 FL (ref 37–54)
ERYTHROCYTE [DISTWIDTH] IN BLOOD BY AUTOMATED COUNT: 12.1 % (ref 12.3–15.4)
GFR SERPL CREATININE-BSD FRML MDRD: 56 ML/MIN/1.73
GLUCOSE SERPL-MCNC: 101 MG/DL (ref 65–99)
HBA1C MFR BLD: 5.2 % (ref 4.8–5.6)
HCT VFR BLD AUTO: 42.2 % (ref 37.5–51)
HDLC SERPL-MCNC: 33 MG/DL (ref 40–60)
HGB BLD-MCNC: 14 G/DL (ref 13–17.7)
LDLC SERPL CALC-MCNC: 48 MG/DL (ref 0–100)
LDLC/HDLC SERPL: 1.39 {RATIO}
MCH RBC QN AUTO: 31.4 PG (ref 26.6–33)
MCHC RBC AUTO-ENTMCNC: 33.2 G/DL (ref 31.5–35.7)
MCV RBC AUTO: 94.6 FL (ref 79–97)
PLATELET # BLD AUTO: 178 10*3/MM3 (ref 140–450)
PMV BLD AUTO: 11.5 FL (ref 6–12)
POTASSIUM SERPL-SCNC: 4.4 MMOL/L (ref 3.5–5.2)
QT INTERVAL: 484 MS
QT INTERVAL: 511 MS
RBC # BLD AUTO: 4.46 10*6/MM3 (ref 4.14–5.8)
SODIUM SERPL-SCNC: 136 MMOL/L (ref 136–145)
TRIGL SERPL-MCNC: 120 MG/DL (ref 0–150)
TROPONIN T SERPL-MCNC: 0.37 NG/ML (ref 0–0.03)
VLDLC SERPL-MCNC: 22 MG/DL (ref 5–40)
WBC # BLD AUTO: 8.44 10*3/MM3 (ref 3.4–10.8)

## 2021-05-12 PROCEDURE — G0378 HOSPITAL OBSERVATION PER HR: HCPCS

## 2021-05-12 PROCEDURE — A9270 NON-COVERED ITEM OR SERVICE: HCPCS | Performed by: INTERNAL MEDICINE

## 2021-05-12 PROCEDURE — 80048 BASIC METABOLIC PNL TOTAL CA: CPT | Performed by: INTERNAL MEDICINE

## 2021-05-12 PROCEDURE — 93010 ELECTROCARDIOGRAM REPORT: CPT | Performed by: INTERNAL MEDICINE

## 2021-05-12 PROCEDURE — 84484 ASSAY OF TROPONIN QUANT: CPT | Performed by: INTERNAL MEDICINE

## 2021-05-12 PROCEDURE — 63710000001 PANTOPRAZOLE 40 MG TABLET DELAYED-RELEASE: Performed by: INTERNAL MEDICINE

## 2021-05-12 PROCEDURE — 99217 PR OBSERVATION CARE DISCHARGE MANAGEMENT: CPT | Performed by: NURSE PRACTITIONER

## 2021-05-12 PROCEDURE — 63710000001 ASPIRIN 81 MG TABLET DELAYED-RELEASE: Performed by: INTERNAL MEDICINE

## 2021-05-12 PROCEDURE — 83036 HEMOGLOBIN GLYCOSYLATED A1C: CPT | Performed by: INTERNAL MEDICINE

## 2021-05-12 PROCEDURE — 63710000001 RANOLAZINE 500 MG TABLET SUSTAINED-RELEASE 12 HOUR: Performed by: INTERNAL MEDICINE

## 2021-05-12 PROCEDURE — 93005 ELECTROCARDIOGRAM TRACING: CPT | Performed by: INTERNAL MEDICINE

## 2021-05-12 PROCEDURE — 63710000001 CHOLECALCIFEROL 25 MCG (1000 UT) TABLET: Performed by: INTERNAL MEDICINE

## 2021-05-12 PROCEDURE — 63710000001 LOSARTAN 50 MG TABLET: Performed by: INTERNAL MEDICINE

## 2021-05-12 PROCEDURE — 80061 LIPID PANEL: CPT | Performed by: INTERNAL MEDICINE

## 2021-05-12 PROCEDURE — 85027 COMPLETE CBC AUTOMATED: CPT | Performed by: INTERNAL MEDICINE

## 2021-05-12 PROCEDURE — 63710000001 CLOPIDOGREL 75 MG TABLET: Performed by: INTERNAL MEDICINE

## 2021-05-12 RX ORDER — NITROGLYCERIN 0.4 MG/1
0.4 TABLET SUBLINGUAL AS NEEDED
Qty: 25 TABLET | Refills: 1 | Status: SHIPPED | OUTPATIENT
Start: 2021-05-12

## 2021-05-12 RX ADMIN — PANTOPRAZOLE SODIUM 40 MG: 40 TABLET, DELAYED RELEASE ORAL at 08:58

## 2021-05-12 RX ADMIN — ASPIRIN 81 MG: 81 TABLET, COATED ORAL at 08:57

## 2021-05-12 RX ADMIN — LOSARTAN POTASSIUM 50 MG: 50 TABLET, FILM COATED ORAL at 08:57

## 2021-05-12 RX ADMIN — RANOLAZINE 1000 MG: 500 TABLET, FILM COATED, EXTENDED RELEASE ORAL at 08:57

## 2021-05-12 RX ADMIN — Medication 2000 UNITS: at 08:57

## 2021-05-12 RX ADMIN — CLOPIDOGREL 75 MG: 75 TABLET, FILM COATED ORAL at 08:57

## 2021-05-12 NOTE — OUTREACH NOTE
Prep Survey      Responses   [facility] patient discharged from?  [place]   Is LACE score < 7 ?  Yes   Emergency Room discharge w/ pulse ox?  No   Eligibility  [facility]   Date of Admission  05/11/21   Date of Discharge  05/12/21   Discharge Disposition  Home or Self Care   Discharge diagnosis  chest pain,   cardiac cath    Does the patient have one of the following disease processes/diagnoses(primary or secondary)?  Other   Does the patient have Home health ordered?  No   Is there a DME ordered?  No   Prep survey completed?  Yes          Diana Reynolds RN

## 2021-05-13 ENCOUNTER — TRANSITIONAL CARE MANAGEMENT TELEPHONE ENCOUNTER (OUTPATIENT)
Dept: CALL CENTER | Facility: HOSPITAL | Age: 81
End: 2021-05-13

## 2021-05-13 NOTE — OUTREACH NOTE
Call Center TCM Note      Responses   [facility] patient discharged from?  [place]   Does the patient have one of the following disease processes/diagnoses(primary or secondary)?  Other   TCM attempt successful?  Yes   Call start time  1348   Call end time  1350   Discharge diagnosis  chest pain,   cardiac cath    Meds reviewed with patient/caregiver?  Yes   Is the patient having any side effects they believe may be caused by any medication additions or changes?  No   Does the patient have all medications ordered at discharge?  N/A   Is the patient taking all medications as directed (includes completed medication regime)?  Yes   Does the patient have a primary care provider?   Yes   Does the patient have an appointment with their PCP within 7 days of discharge?  Greater than 7 days   Comments regarding PCP  PCP APPOINTMENT IS 5/24/21   What is preventing the patient from scheduling follow up appointments within 7 days of discharge?  Earlier appointment not available   Nursing Interventions  Verified appointment date/time/provider   Has the patient kept scheduled appointments due by today?  N/A   Has home health visited the patient within 72 hours of discharge?  N/A   Psychosocial issues?  No   Did the patient receive a copy of their discharge instructions?  Yes   Nursing interventions  Reviewed instructions with patient   What is the patient's perception of their health status since discharge?  Improving   Is the patient/caregiver able to teach back signs and symptoms related to disease process for when to call PCP?  Yes   Is the patient/caregiver able to teach back signs and symptoms related to disease process for when to call 911?  Yes   Is the patient/caregiver able to teach back the hierarchy of who to call/visit for symptoms/problems? PCP, Specialist, Home health nurse, Urgent Care, ED, 911  Yes   If the patient is a current smoker, are they able to teach back resources for cessation?  Not a smoker    TCM call completed?  Yes          Cornelia Frazier LPN    5/13/2021, 13:52 EDT

## 2021-05-20 ENCOUNTER — OFFICE VISIT (OUTPATIENT)
Dept: CARDIOLOGY | Facility: CLINIC | Age: 81
End: 2021-05-20

## 2021-05-20 VITALS
SYSTOLIC BLOOD PRESSURE: 118 MMHG | BODY MASS INDEX: 27.3 KG/M2 | WEIGHT: 195 LBS | OXYGEN SATURATION: 98 % | HEIGHT: 71 IN | HEART RATE: 70 BPM | DIASTOLIC BLOOD PRESSURE: 74 MMHG

## 2021-05-20 DIAGNOSIS — I10 ESSENTIAL HYPERTENSION: ICD-10-CM

## 2021-05-20 DIAGNOSIS — E78.49 OTHER HYPERLIPIDEMIA: ICD-10-CM

## 2021-05-20 DIAGNOSIS — I45.5 SINUS PAUSE: ICD-10-CM

## 2021-05-20 DIAGNOSIS — Z95.1 HX OF CABG: ICD-10-CM

## 2021-05-20 DIAGNOSIS — I25.10 CORONARY ARTERY DISEASE INVOLVING NATIVE CORONARY ARTERY OF NATIVE HEART WITHOUT ANGINA PECTORIS: Primary | ICD-10-CM

## 2021-05-20 PROCEDURE — 99214 OFFICE O/P EST MOD 30 MIN: CPT | Performed by: NURSE PRACTITIONER

## 2021-05-20 NOTE — PROGRESS NOTES
CARDIOLOGY        Patient Name: Kenn Brito  :1940  Age: 80 y.o.  Primary Cardiologist: Sam Wright MD  Encounter Provider:  FALLON Liu    Date of Service: 21          CHIEF COMPLAINT / REASON FOR OFFICE VISIT     Coronary Artery Disease (sinus pause / [facility] 1 wk f/u )      HISTORY OF PRESENT ILLNESS       HPI  Kenn Brito is a 80 y.o. male who presents today for hospital follow-up.     Pt has a  history significant for CAD with previous bypass, EVELINA, hypertension, sinus bradycardia.    Review of medical records reveals patient hospitalized with discharge date 2021.  Patient was admitted with complaints of chest pain and arm pain intermittent for the past several weeks that was made worse by exertion.  Troponin was elevated so he subsequently underwent left cardiac catheterization.  His previous placed stent was patent but he had a 95% distal to the vein graft anastomosis occlusion that was ulcerated.  Patient had PCI with RANCHO placed.  Medications were not changed.  He remains on DAPT.    Patient reports that he always gets achiness in his bilateral upper extremities when he is having cardiac episodes.  He states that since this most recent stent was placed he has not experienced any further cardiac episodes.  He states that in the past he has done cardiac rehab and did not have a good experience and he is declining to return to cardiac rehab.  He reports that he remains active working in the yard and he does walk somewhat frequently but does not exercise routinely.  He is compliant with all of his medications and not experiencing any adverse effects.  Denies any chest pain, shortness of breath, dyspnea with exertion, palpitations, lightheadedness, edema, fatigue.      The following portions of the patient's history were reviewed and updated as appropriate: allergies, current medications, past family history, past medical history, past social history, past surgical  "history and problem list.      VITAL SIGNS     Visit Vitals  /74 (BP Location: Left arm, Patient Position: Sitting, Cuff Size: Adult)   Pulse 70   Ht 180.3 cm (71\")   Wt 88.5 kg (195 lb)   SpO2 98%   BMI 27.20 kg/m²         Wt Readings from Last 3 Encounters:   05/20/21 88.5 kg (195 lb)   05/11/21 89.2 kg (196 lb 9.6 oz)   04/07/21 92.1 kg (203 lb)     Body mass index is 27.2 kg/m².      REVIEW OF SYSTEMS   Review of Systems   Constitutional: Negative for chills, fever, weight gain and weight loss.   Cardiovascular: Negative for leg swelling.   Respiratory: Negative for cough, snoring and wheezing.    Hematologic/Lymphatic: Negative for bleeding problem. Does not bruise/bleed easily.   Skin: Negative for color change.   Musculoskeletal: Negative for falls, joint pain and myalgias.   Gastrointestinal: Negative for melena.   Genitourinary: Negative for hematuria.   Neurological: Negative for excessive daytime sleepiness.   Psychiatric/Behavioral: Negative for depression. The patient is not nervous/anxious.            PHYSICAL EXAMINATION     Constitutional:       Appearance: Normal appearance. Well-developed.   Eyes:      Conjunctiva/sclera: Conjunctivae normal.   Neck:      Vascular: No carotid bruit.   Pulmonary:      Effort: Pulmonary effort is normal.      Breath sounds: Normal breath sounds.   Cardiovascular:      Normal rate. Regular rhythm. Normal S1. Normal S2.      Murmurs: There is no murmur.      No gallop. No click. No rub.   Edema:     Peripheral edema absent.   Musculoskeletal: Normal range of motion.      Comments: No pedal edema Skin:     General: Skin is warm and dry.   Neurological:      Mental Status: Alert and oriented to person, place, and time.      GCS: GCS eye subscore is 4. GCS verbal subscore is 5. GCS motor subscore is 6.   Psychiatric:         Speech: Speech normal.         Behavior: Behavior normal.         Thought Content: Thought content normal.         Judgment: Judgment normal. "           REVIEWED DATA     Procedures    Cardiac Procedures:  1. Myocardial perfusion PET stress/12/2021.  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  Additional medium sized, moderate to severe area of ischemia in the apex.  Impressions consistent with high risk study.  2. Cardiac catheterization 1/28/2021.  Left main 70% origin lesion with dampening of the catheter.  LAD 10-20% proximal disease, normal at midportion and distally there is first diagonal that is occluded.  Ramus is a large vessel with 30-40% disease.  Circumflex a small vessel with 70-80% lesion in the midportion not amenable to PCI.  RCA is dominant vessel 100% occluded.  LIMA widely patent not used his bypass.  SVG to diagonal was occluded.  SVG to OM1 is occluded.  SVG to RCA is widely patent but the distal portion there is a 70-80% degenerated lesion, native RCA has some luminal irregularities.  Patient treated with PCI and RANCHO.  3. ZIO monitor 2/24/2021.  Predominant rhythm was sinus.  PACs occurred occasionally.  PVCs occurred frequently with ventricular couplets bigeminy and trigeminy.  There was a 4 beat episode of nonsustained V. tach.  4 pauses with the longest pause lasting 5.3 seconds.  4. Cardiac catheterization 5/11/2021.  Left main.  It previously placed stent with a 10-20% in-stent waist.  LAD with 20% mid vessel stenosis.  Very small caliber D2 branch with discrete 90% ostial stenosis.  Left circumflex with  mid segment.  Fills via left to left collaterals.  OM1 small caliber with discrete 80% stenosis.  RCA  proximal segment with discrete 95% distal stenosis that is ulcerated with KIERA II flow.  SVG to RCA is normal.  SVG to diagonal and OM are known to be occluded.  LIMA not used.  Successful PCI of the distal RCA through the SVG to RCA graft with RANCHO.    Lipid Panel    Lipid Panel 1/29/21 5/12/21   Total Cholesterol 75 103   Triglycerides 98 120   HDL Cholesterol 30 (A) 33 (A)   VLDL  Cholesterol 19 22   LDL Cholesterol  26 48   LDL/HDL Ratio 0.85 1.39   (A) Abnormal value                ASSESSMENT & PLAN      Diagnosis Plan   1. Coronary artery disease involving native coronary artery of native heart without angina pectoris     2. Hx of CABG     3. Essential hypertension     4. Other hyperlipidemia     5. Sinus pause           SUMMARY/DISCUSSION  1. CAD with prior CABG and multiple drug-eluting stents placed.  Patient has had 2 bouts of episodes this year.  First episode was in January where he required to drug-eluting stents to be placed.  Patient recently had PCI of the distal RCA through the SVG to RCA graft treated with RANCHO.  His cardiac symptom is always upper extremity achiness.  States that since stent placement he has had no subsequent symptoms.  He is compliant with DAPT with aspirin and Plavix.  He is on statin therapy with atorvastatin.  He will continue isosorbide mononitrate, Ranexa, losartan.  History of sinus pauses with bradycardia on beta-blocker therapy.  2. Hypertension.  Blood pressure currently controlled at 118/74.  Continue losartan 50 mg/day, furosemide 40 mg/day.  3. Hyperlipidemia.  LDL 48.  Goal LDL less than 70.  Continue atorvastatin.  4. History of sinus pause.  This occurred while on beta-blocker therapy and patient was asymptomatic.  No subsequent episodes since beta-blockers have been discontinued.  5. Follow-up with Dr. Wright in 3-4 months.  Sooner for any problems or complications.        MEDICATIONS         Discharge Medications          Accurate as of May 20, 2021  9:11 AM. If you have any questions, ask your nurse or doctor.            Changes to Medications      Instructions Start Date   atorvastatin 80 MG tablet  Commonly known as: LIPITOR  What changed: when to take this   TAKE 1 TABLET BY MOUTH EVERY DAY      furosemide 20 MG tablet  Commonly known as: LASIX  What changed: how much to take   20 mg, Oral, Daily      isosorbide mononitrate 30 MG 24 hr  tablet  Commonly known as: IMDUR  What changed: when to take this   TAKE 1 TABLET BY MOUTH DAILY      ranolazine 1000 MG 12 hr tablet  Commonly known as: RANEXA  What changed: when to take this   TAKE 1 TABLET BY MOUTH EVERY 12 HOURS         Continue These Medications      Instructions Start Date   aspirin 81 MG EC tablet   81 mg, Oral, Daily      clopidogrel 75 MG tablet  Commonly known as: PLAVIX   75 mg, Oral, Daily      losartan 50 MG tablet  Commonly known as: COZAAR   50 mg, Oral, Daily      nitroglycerin 0.4 MG SL tablet  Commonly known as: NITROSTAT   0.4 mg, Sublingual, As Needed      Omega-3 1000 MG capsule   1,000 mg, Oral, Daily      pantoprazole 40 MG EC tablet  Commonly known as: Protonix   40 mg, Oral, Daily      Vitamin D3 50 MCG (2000 UT) tablet   2,000 Units, Oral, Daily                 **Dragon Disclaimer:   Much of this encounter note is an electronic transcription/translation of spoken language to printed text. The electronic translation of spoken language may permit erroneous, or at times, nonsensical words or phrases to be inadvertently transcribed. Although I have reviewed the note for such errors, some may still exist.

## 2021-06-08 DIAGNOSIS — I10 ESSENTIAL HYPERTENSION: ICD-10-CM

## 2021-06-08 RX ORDER — LOSARTAN POTASSIUM 50 MG/1
50 TABLET ORAL DAILY
Qty: 90 TABLET | Refills: 3 | Status: SHIPPED | OUTPATIENT
Start: 2021-06-08 | End: 2021-06-10

## 2021-06-10 DIAGNOSIS — I10 ESSENTIAL HYPERTENSION: ICD-10-CM

## 2021-06-10 RX ORDER — LOSARTAN POTASSIUM 50 MG/1
50 TABLET ORAL DAILY
Qty: 90 TABLET | Refills: 3 | Status: SHIPPED | OUTPATIENT
Start: 2021-06-10 | End: 2022-06-06

## 2021-06-17 ENCOUNTER — OFFICE VISIT (OUTPATIENT)
Dept: INTERNAL MEDICINE | Facility: CLINIC | Age: 81
End: 2021-06-17

## 2021-06-17 VITALS
WEIGHT: 197 LBS | DIASTOLIC BLOOD PRESSURE: 76 MMHG | TEMPERATURE: 97.8 F | BODY MASS INDEX: 27.58 KG/M2 | SYSTOLIC BLOOD PRESSURE: 122 MMHG | HEIGHT: 71 IN

## 2021-06-17 DIAGNOSIS — I25.10 CORONARY ARTERY DISEASE INVOLVING NATIVE CORONARY ARTERY OF NATIVE HEART WITHOUT ANGINA PECTORIS: ICD-10-CM

## 2021-06-17 DIAGNOSIS — E78.49 OTHER HYPERLIPIDEMIA: Primary | Chronic | ICD-10-CM

## 2021-06-17 DIAGNOSIS — I10 ESSENTIAL HYPERTENSION: Chronic | ICD-10-CM

## 2021-06-17 DIAGNOSIS — N18.31 STAGE 3A CHRONIC KIDNEY DISEASE (HCC): Chronic | ICD-10-CM

## 2021-06-17 DIAGNOSIS — R91.8 MULTIPLE LUNG NODULES ON CT: ICD-10-CM

## 2021-06-17 PROBLEM — R79.89 INCREASED PTH LEVEL: Status: RESOLVED | Noted: 2019-05-20 | Resolved: 2021-06-17

## 2021-06-17 PROBLEM — E34.9 INCREASED PTH LEVEL: Status: RESOLVED | Noted: 2019-05-20 | Resolved: 2021-06-17

## 2021-06-17 PROCEDURE — 99214 OFFICE O/P EST MOD 30 MIN: CPT | Performed by: INTERNAL MEDICINE

## 2021-06-17 RX ORDER — FUROSEMIDE 40 MG/1
40 TABLET ORAL DAILY
COMMUNITY
End: 2021-06-17 | Stop reason: SDUPTHER

## 2021-06-17 RX ORDER — FUROSEMIDE 40 MG/1
40 TABLET ORAL DAILY
Qty: 90 TABLET | Refills: 3 | Status: SHIPPED | OUTPATIENT
Start: 2021-06-17 | End: 2021-08-23 | Stop reason: SDUPTHER

## 2021-06-17 NOTE — PROGRESS NOTES
Subjective        Chief Complaint   Patient presents with   • Hypertension   • Coronary Artery Disease           Kenn Brito is a 80 y.o. male who presents for    Patient Active Problem List   Diagnosis   • CAD (coronary artery disease)   • GERD (gastroesophageal reflux disease)   • Hx of CABG   • Other hyperlipidemia   • Essential hypertension   • EVELINA on CPAP   • Multiple lung nodules on CT   • PVC (premature ventricular contraction)   • Coronary artery disease involving native coronary artery of native heart without angina pectoris   • Sinus pause   • Stage 3a chronic kidney disease (CMS/HCC)       History of Present Illness     He denies chest pain. He had an ache in his arms. He went to the Er and he had a bump in his troponin. He was taken to the cath lab and he had a a drug eluting stent placed in a vein graft anastomosis. He is not having pain now. He only has dyspnea if he over does it but that is not new. He has checked his BP twice. It was 120/76. He had his lasix increased from 20 mg daily to 40 mg daily. [place]  No Known Allergies    Current Outpatient Medications on File Prior to Visit   Medication Sig Dispense Refill   • aspirin 81 MG EC tablet Take 81 mg by mouth Daily.     • atorvastatin (LIPITOR) 80 MG tablet TAKE 1 TABLET BY MOUTH EVERY DAY (Patient taking differently: Take 80 mg by mouth Every Night.) 90 tablet 3   • Cholecalciferol (VITAMIN D3) 2000 units tablet Take 2,000 Units by mouth Daily.     • clopidogrel (PLAVIX) 75 MG tablet Take 1 tablet by mouth Daily. 30 tablet 11   • isosorbide mononitrate (IMDUR) 30 MG 24 hr tablet TAKE 1 TABLET BY MOUTH DAILY (Patient taking differently: Take 30 mg by mouth Every Night.) 90 tablet 3   • losartan (COZAAR) 50 MG tablet TAKE 1 TABLET BY MOUTH DAILY 90 tablet 3   • nitroglycerin (NITROSTAT) 0.4 MG SL tablet Place 1 tablet under the tongue As Needed for Chest Pain. 25 tablet 1   • Omega-3 1000 MG capsule Take 1,000  mg by mouth Daily.     • pantoprazole (Protonix) 40 MG EC tablet Take 1 tablet by mouth Daily. 30 tablet 11   • ranolazine (RANEXA) 1000 MG 12 hr tablet TAKE 1 TABLET BY MOUTH EVERY 12 HOURS (Patient taking differently: Take 1,000 mg by mouth Every 12 (Twelve) Hours.) 180 tablet 3   • [DISCONTINUED] furosemide (LASIX) 20 MG tablet TAKE 1 TABLET BY MOUTH DAILY (Patient taking differently: Take 40 mg by mouth Daily.) 90 tablet 1   • [DISCONTINUED] furosemide (LASIX) 40 MG tablet Take 40 mg by mouth Daily.       No current facility-administered medications on file prior to visit.       Past Medical History:   Diagnosis Date   • GERD (gastroesophageal reflux disease)    • Herpes zoster    • Hypercalcemia    • Increased PTH level 5/20/2019   • PVC (premature ventricular contraction)    • Renal cyst    • Sciatica    • Tubular adenoma of colon 2014    X3       Past Surgical History:   Procedure Laterality Date   • CARDIAC CATHETERIZATION  03/2016   • CARDIAC CATHETERIZATION Left 03/26/2013    x1 stent [place], Dr. Andrei Lutz   • CARDIAC CATHETERIZATION N/A 1/28/2021    Procedure: Coronary angiography;  Surgeon: Jaguar Umana MD;  Location: [facility] CATH INVASIVE LOCATION;  Service: Cardiovascular;  Laterality: N/A;   • CARDIAC CATHETERIZATION N/A 1/28/2021    Procedure: Left heart cath;  Surgeon: Jaguar Umana MD;  Location: [facility] CATH INVASIVE LOCATION;  Service: Cardiovascular;  Laterality: N/A;   • CARDIAC CATHETERIZATION N/A 1/28/2021    Procedure: Left ventriculography;  Surgeon: Jaguar Umana MD;  Location: [facility] CATH INVASIVE LOCATION;  Service: Cardiovascular;  Laterality: N/A;   • CARDIAC CATHETERIZATION N/A 1/28/2021    Procedure: Saphenous Vein Graft;  Surgeon: Jaguar Umana MD;  Location: [facility] CATH INVASIVE LOCATION;  Service: Cardiovascular;  Laterality: N/A;   • CARDIAC CATHETERIZATION N/A 1/28/2021    Procedure: Resting Full Cycle Ratio;  Surgeon: Jaguar Umana MD;   Location:  [facility] CATH INVASIVE LOCATION;  Service: Cardiovascular;  Laterality: N/A;   • CARDIAC CATHETERIZATION N/A 1/28/2021    Procedure: Stent RANCHO coronary;  Surgeon: Jaguar Umana MD;  Location:  [facility] CATH INVASIVE LOCATION;  Service: Cardiovascular;  Laterality: N/A;   • CARDIAC CATHETERIZATION N/A 1/28/2021    Procedure: Stent RANCHO bypass graft-RCA;  Surgeon: Jaguar Umana MD;  Location:  [facility] CATH INVASIVE LOCATION;  Service: Cardiovascular;  Laterality: N/A;   • CARDIAC CATHETERIZATION N/A 5/11/2021    Procedure: CORONARY ANGIOGRAPHY;  Surgeon: Freedom Mccollum MD;  Location:  [facility] CATH INVASIVE LOCATION;  Service: Cardiovascular;  Laterality: N/A;   • CARDIAC CATHETERIZATION N/A 5/11/2021    Procedure: Stent RANCHO bypass graft;  Surgeon: Freedom Mccollum MD;  Location:  [facility] CATH INVASIVE LOCATION;  Service: Cardiovascular;  Laterality: N/A;   • CARDIAC CATHETERIZATION  5/11/2021    Procedure: Saphenous Vein Graft;  Surgeon: Freedom Mccollum MD;  Location: [facility] C[facility] INVASIVE LOC[facility];  Service: Cardiovascular;;   • COLONOSCOPY  10/29/2014   • CORONARY ARTERY BYPASS GRAFT  2000    x3 vessel   • INTERVENTIONAL RADIOLOGY PROCEDURE N/A 1/28/2021    Procedure: Intravascular Ultrasound;  Surgeon: Jaguar Umana MD;  Location: [facility] CATH INVASIVE LOCATION;  Service: Cardiovascular;  Laterality: N/A;   • REPLACEMENT TOTAL KNEE Right 12/2019       Family History   Problem Relation Age of Onset   • Throat cancer Father    • Peripheral vascular disease Brother    • Diabetes Brother    • Heart disease Brother    • Crohn's disease Daughter        Social History     Socioeconomic History   • Marital status:      Spouse name: Not on file   • Number of children: Not on file   • Years of education: Not on file   • Highest education level: Not on file   Tobacco Use   • Smoking status: Former Smoker     Packs/day: 0.50     Years: 20.00     Pack years: 10.00     Types: Cigarettes     Quit  "date: 1988     Years since quittin.4   • Smokeless tobacco: Never Used   Substance and Sexual Activity   • Alcohol use: Yes     Alcohol/week: 0.0 - 1.0 standard drinks     Comment: rare social   • Drug use: No   • Sexual activity: Defer           The following portions of the patient's history were reviewed and updated as appropriate: problem list, allergies, current medications, past medical history, past family history, past social history and past surgical history.    Review of Systems    Immunization History   Administered Date(s) Administered   • COVID-19 (MODERNA) 2021, 2021   • Fluad Quad 65+ 09/10/2020   • Fluzone High Dose =>65 Years ([employer] ONLY) 2018   • Pneumococcal Conjugate 13-Valent (PCV13) 2018   • Pneumococcal Polysaccharide (PPSV23) 2019   • Tdap 2018       Objective   Vitals:    21 0754   BP: 122/76   Temp: 97.8 °F (36.6 °C)   Weight: 89.4 kg (197 lb)   Height: 180.3 cm (71\")     Body mass index is 27.48 kg/m².  Physical Exam  Vitals reviewed.   Constitutional:       Appearance: He is well-developed.   HENT:      Head: Normocephalic and atraumatic.   Cardiovascular:      Rate and Rhythm: Normal rate and regular rhythm. Occasional extrasystoles are present.     Heart sounds: Normal heart sounds, S1 normal and S2 normal.   Pulmonary:      Effort: Pulmonary effort is normal.      Breath sounds: Normal breath sounds.   Skin:     General: Skin is warm.   Neurological:      Mental Status: He is alert.   Psychiatric:         Behavior: Behavior normal.         Procedures    Assessment/Plan   Diagnoses and all orders for this visit:    1. Other hyperlipidemia (Primary)    2. Essential hypertension    3. Coronary artery disease involving native coronary artery of native heart without angina pectoris    4. Stage 3a chronic kidney disease (CMS/HCC)  -     Basic Metabolic Panel; Future    5. Multiple lung nodules on CT  -     CT Chest Without Contrast " Diagnostic; Future    Other orders  -     furosemide (LASIX) 40 MG tablet; Take 1 tablet by mouth Daily.  Dispense: 90 tablet; Refill: 3               Reviewed cmp, cbc, psa, and lipid panel. LDL is at goal. BP is good and his cardiac symptoms have resolved.  Return in about 6 months (around 12/17/2021) for Medicare Wellness, Lab Before FUP.

## 2021-07-20 ENCOUNTER — OFFICE VISIT (OUTPATIENT)
Dept: CARDIOLOGY | Facility: CLINIC | Age: 81
End: 2021-07-20

## 2021-07-20 VITALS
HEIGHT: 71 IN | BODY MASS INDEX: 28.39 KG/M2 | DIASTOLIC BLOOD PRESSURE: 74 MMHG | OXYGEN SATURATION: 98 % | WEIGHT: 202.8 LBS | HEART RATE: 64 BPM | SYSTOLIC BLOOD PRESSURE: 120 MMHG

## 2021-07-20 DIAGNOSIS — Z99.89 OSA ON CPAP: ICD-10-CM

## 2021-07-20 DIAGNOSIS — I25.10 CORONARY ARTERY DISEASE INVOLVING NATIVE CORONARY ARTERY OF NATIVE HEART WITHOUT ANGINA PECTORIS: Primary | ICD-10-CM

## 2021-07-20 DIAGNOSIS — I49.3 PVC (PREMATURE VENTRICULAR CONTRACTION): ICD-10-CM

## 2021-07-20 DIAGNOSIS — G47.33 OSA ON CPAP: ICD-10-CM

## 2021-07-20 PROCEDURE — 99214 OFFICE O/P EST MOD 30 MIN: CPT | Performed by: INTERNAL MEDICINE

## 2021-07-20 NOTE — PROGRESS NOTES
"      CARDIOLOGY    Sam Wright MD    ENCOUNTER DATE:  07/20/2021    Kenn Brito / 80 y.o. / male        CHIEF COMPLAINT / REASON FOR OFFICE VISIT     Coronary Artery Disease      HISTORY OF PRESENT ILLNESS       HPI  Knen Brito is a 80 y.o. male who presents today for reevaluation.  Patient was found to have coronary artery disease status post angioplasty and follows up today doing well.  He has no chest pain shortness of breath palpitations swelling or fatigue.  He says he is feeling very good he is able to do what he wants to without the chest discomfort.  Blood pressures also been running excellent.      The following portions of the patient's history were reviewed and updated as appropriate: allergies, current medications, past family history, past medical history, past social history, past surgical history and problem list.      VITAL SIGNS     Visit Vitals  /74 (BP Location: Left arm)   Pulse 64   Ht 180.3 cm (71\")   Wt 92 kg (202 lb 12.8 oz)   SpO2 98%   BMI 28.28 kg/m²         Wt Readings from Last 3 Encounters:   07/20/21 92 kg (202 lb 12.8 oz)   06/17/21 89.4 kg (197 lb)   05/20/21 88.5 kg (195 lb)     Body mass index is 28.28 kg/m².      REVIEW OF SYSTEMS   Review of Systems   All other systems reviewed and are negative.          PHYSICAL EXAMINATION     Vitals reviewed.   Constitutional:       Appearance: Healthy appearance.   Pulmonary:      Effort: Pulmonary effort is normal.      Breath sounds: Normal breath sounds.   Cardiovascular:      Normal rate. Regular rhythm. Normal S1. Normal S2.      Murmurs: There is no murmur.      No gallop. No click. No rub.   Pulses:     Intact distal pulses.   Edema:     Peripheral edema absent.   Neurological:      Mental Status: Alert and oriented to person, place and time.           REVIEWED DATA     Procedures    Cardiac Procedures:  1.     Lipid Panel    Lipid Panel 1/29/21 5/12/21 6/10/21   Total Cholesterol 75 103    Total Cholesterol   " 108   Triglycerides 98 120 85   HDL Cholesterol 30 (A) 33 (A) 35 (A)   VLDL Cholesterol 19 22 17   LDL Cholesterol  26 48 56   LDL/HDL Ratio 0.85 1.39    (A) Abnormal value       Comments are available for some flowsheets but are not being displayed.               ASSESSMENT & PLAN      Diagnosis Plan   1. Coronary artery disease involving native coronary artery of native heart without angina pectoris     2. PVC (premature ventricular contraction)     3. EVELINA on CPAP           SUMMARY/DISCUSSION  1. Coronary artery disease.  Patient is doing very well on his current regimen regiment.  Encouraged to continue to be as active as possible.  2. He did ask about his blood pressure.  Sometimes it runs down to 113/63.  He does not describe being symptomatic with that therefore I am okay with it I explained that to them.  3. PVCs asymptomatic  4. Follow-up 1 year sooner if issues        MEDICATIONS         Discharge Medications          Accurate as of July 20, 2021  1:06 PM. If you have any questions, ask your nurse or doctor.            Changes to Medications      Instructions Start Date   atorvastatin 80 MG tablet  Commonly known as: LIPITOR  What changed: when to take this   TAKE 1 TABLET BY MOUTH EVERY DAY      isosorbide mononitrate 30 MG 24 hr tablet  Commonly known as: IMDUR  What changed: when to take this   TAKE 1 TABLET BY MOUTH DAILY      ranolazine 1000 MG 12 hr tablet  Commonly known as: RANEXA  What changed: when to take this   TAKE 1 TABLET BY MOUTH EVERY 12 HOURS         Continue These Medications      Instructions Start Date   aspirin 81 MG EC tablet   81 mg, Oral, Daily      clopidogrel 75 MG tablet  Commonly known as: PLAVIX   75 mg, Oral, Daily      furosemide 40 MG tablet  Commonly known as: LASIX   40 mg, Oral, Daily      losartan 50 MG tablet  Commonly known as: COZAAR   50 mg, Oral, Daily      nitroglycerin 0.4 MG SL tablet  Commonly known as: NITROSTAT   0.4 mg, Sublingual, As Needed      Omega-3 1000  MG capsule   1,000 mg, Oral, Daily      pantoprazole 40 MG EC tablet  Commonly known as: Protonix   40 mg, Oral, Daily      Vitamin D3 50 MCG (2000 UT) tablet   2,000 Units, Oral, Daily                 **Dragon Disclaimer:   Much of this encounter note is an electronic transcription/translation of spoken language to printed text. The electronic translation of spoken language may permit erroneous, or at times, nonsensical words or phrases to be inadvertently transcribed. Although I have reviewed the note for such errors, some may still exist.

## 2021-08-17 RX ORDER — ISOSORBIDE MONONITRATE 30 MG/1
TABLET, EXTENDED RELEASE ORAL
Qty: 90 TABLET | Refills: 0 | Status: SHIPPED | OUTPATIENT
Start: 2021-08-17 | End: 2021-11-15

## 2021-08-21 DIAGNOSIS — I10 ESSENTIAL HYPERTENSION: ICD-10-CM

## 2021-08-23 RX ORDER — FUROSEMIDE 20 MG/1
20 TABLET ORAL DAILY
Qty: 90 TABLET | Refills: 1 | OUTPATIENT
Start: 2021-08-23

## 2021-08-23 RX ORDER — FUROSEMIDE 40 MG/1
40 TABLET ORAL DAILY
Qty: 90 TABLET | Refills: 3 | Status: SHIPPED | OUTPATIENT
Start: 2021-08-23 | End: 2022-07-08

## 2021-11-15 RX ORDER — ISOSORBIDE MONONITRATE 30 MG/1
TABLET, EXTENDED RELEASE ORAL
Qty: 90 TABLET | Refills: 0 | Status: SHIPPED | OUTPATIENT
Start: 2021-11-15 | End: 2022-02-11

## 2021-11-22 ENCOUNTER — HOSPITAL ENCOUNTER (OUTPATIENT)
Dept: CT IMAGING | Facility: HOSPITAL | Age: 81
Discharge: HOME | End: 2021-11-22
Admitting: INTERNAL MEDICINE

## 2021-11-22 DIAGNOSIS — R91.8 MULTIPLE LUNG NODULES ON CT: ICD-10-CM

## 2021-11-22 PROCEDURE — 71250 CT THORAX DX C-: CPT

## 2022-01-17 ENCOUNTER — TELEPHONE (OUTPATIENT)
Dept: INTERNAL MEDICINE | Facility: CLINIC | Age: 82
End: 2022-01-17

## 2022-01-17 RX ORDER — CLOPIDOGREL BISULFATE 75 MG/1
75 TABLET ORAL DAILY
Qty: 30 TABLET | Refills: 11 | Status: SHIPPED | OUTPATIENT
Start: 2022-01-17 | End: 2023-01-09

## 2022-01-21 RX ORDER — PANTOPRAZOLE SODIUM 40 MG/1
40 TABLET, DELAYED RELEASE ORAL DAILY
Qty: 30 TABLET | Refills: 6 | Status: SHIPPED | OUTPATIENT
Start: 2022-01-21 | End: 2022-08-05

## 2022-02-11 DIAGNOSIS — E78.49 OTHER HYPERLIPIDEMIA: Chronic | ICD-10-CM

## 2022-02-11 RX ORDER — ATORVASTATIN CALCIUM 80 MG/1
TABLET, FILM COATED ORAL
Qty: 90 TABLET | Refills: 3 | Status: SHIPPED | OUTPATIENT
Start: 2022-02-11 | End: 2023-04-06

## 2022-02-11 RX ORDER — ISOSORBIDE MONONITRATE 30 MG/1
TABLET, EXTENDED RELEASE ORAL
Qty: 90 TABLET | Refills: 0 | Status: SHIPPED | OUTPATIENT
Start: 2022-02-11 | End: 2022-05-16

## 2022-02-15 ENCOUNTER — OFFICE VISIT (OUTPATIENT)
Dept: INTERNAL MEDICINE | Facility: CLINIC | Age: 82
End: 2022-02-15

## 2022-02-15 VITALS
DIASTOLIC BLOOD PRESSURE: 80 MMHG | BODY MASS INDEX: 27.58 KG/M2 | SYSTOLIC BLOOD PRESSURE: 120 MMHG | HEIGHT: 71 IN | WEIGHT: 197 LBS | TEMPERATURE: 97.5 F

## 2022-02-15 DIAGNOSIS — E78.49 OTHER HYPERLIPIDEMIA: Chronic | ICD-10-CM

## 2022-02-15 DIAGNOSIS — N18.31 STAGE 3A CHRONIC KIDNEY DISEASE: Chronic | ICD-10-CM

## 2022-02-15 DIAGNOSIS — Z00.00 MEDICARE ANNUAL WELLNESS VISIT, SUBSEQUENT: Primary | ICD-10-CM

## 2022-02-15 DIAGNOSIS — Z12.5 SCREENING FOR PROSTATE CANCER: ICD-10-CM

## 2022-02-15 DIAGNOSIS — I10 ESSENTIAL HYPERTENSION: Chronic | ICD-10-CM

## 2022-02-15 DIAGNOSIS — I25.10 CORONARY ARTERY DISEASE INVOLVING NATIVE CORONARY ARTERY OF NATIVE HEART WITHOUT ANGINA PECTORIS: ICD-10-CM

## 2022-02-15 PROCEDURE — G0439 PPPS, SUBSEQ VISIT: HCPCS | Performed by: INTERNAL MEDICINE

## 2022-02-15 PROCEDURE — 1159F MED LIST DOCD IN RCRD: CPT | Performed by: INTERNAL MEDICINE

## 2022-02-15 NOTE — PROGRESS NOTES
The ABCs of the Annual Wellness Visit  Subsequent Medicare Wellness Visit    Chief Complaint   Patient presents with   • Medicare Wellness-subsequent      Subjective    History of Present Illness:  Kenn Brito is a 81 y.o. male who presents for a Subsequent Medicare Wellness Visit.  He denies chest pain or dyspnea. He has not been checking his BP.  The following portions of the patient's history were reviewed and   updated as appropriate: allergies, current medications, past family history, past medical history, past social history, past surgical history and problem list.    Compared to one year ago, the patient feels his physical   health is the same.    Compared to one year ago, the patient feels his mental   health is the same.    Recent Hospitalizations:  He was admitted within the past 365 days at [facility], [place], operated by [facility].       Current Medical Providers:  Patient Care Team:  Dante Tabor MD as PCP - General (Internal Medicine)    Outpatient Medications Prior to Visit   Medication Sig Dispense Refill   • aspirin 81 MG EC tablet Take 81 mg by mouth Daily.     • atorvastatin (LIPITOR) 80 MG tablet TAKE 1 TABLET BY MOUTH EVERY DAY 90 tablet 3   • Cholecalciferol (VITAMIN D3) 2000 units tablet Take 2,000 Units by mouth Daily.     • clopidogrel (PLAVIX) 75 MG tablet TAKE 1 TABLET BY MOUTH DAILY 30 tablet 11   • furosemide (LASIX) 40 MG tablet Take 1 tablet by mouth Daily. 90 tablet 3   • isosorbide mononitrate (IMDUR) 30 MG 24 hr tablet TAKE 1 TABLET BY MOUTH DAILY 90 tablet 0   • losartan (COZAAR) 50 MG tablet TAKE 1 TABLET BY MOUTH DAILY 90 tablet 3   • nitroglycerin (NITROSTAT) 0.4 MG SL tablet Place 1 tablet under the tongue As Needed for Chest Pain. 25 tablet 1   • Omega-3 1000 MG capsule Take 1,000 mg by mouth Daily.     • pantoprazole (Protonix) 40 MG EC tablet Take 1 tablet by mouth Daily. 30 tablet 6   • ranolazine (RANEXA) 1000 MG 12 hr tablet TAKE 1 TABLET BY MOUTH EVERY 12 HOURS (Patient taking  "differently: Take 1,000 mg by mouth Every 12 (Twelve) Hours.) 180 tablet 3     No facility-administered medications prior to visit.       No opioid medication identified on active medication list. I have reviewed chart for other potential  high risk medication/s and harmful drug interactions in the elderly.          Aspirin is on active medication list. Aspirin use is indicated based on review of current medical condition/s. Pros and cons of this therapy have been discussed today. Benefits of this medication outweigh potential harm.  Patient has been encouraged to continue taking this medication.  .      Patient Active Problem List   Diagnosis   • CAD (coronary artery disease)   • GERD (gastroesophageal reflux disease)   • Other hyperlipidemia   • Essential hypertension   • EVELINA on CPAP   • Multiple lung nodules on CT   • PVC (premature ventricular contraction)   • Coronary artery disease involving native coronary artery of native heart without angina pectoris   • Sinus pause   • Stage 3a chronic kidney disease (HCC)     Advance Care Planning  Advance Directive is not on file.  ACP discussion was held with the patient during this visit. Patient has an advance directive (not in EMR), copy requested.          Objective    Vitals:    02/15/22 1536   BP: 120/80   Temp: 97.5 °F (36.4 °C)   Weight: 89.4 kg (197 lb)   Height: 180.3 cm (71\")     BMI Readings from Last 1 Encounters:   02/15/22 27.48 kg/m²   BMI is above normal parameters. Recommendations include: exercise counseling    Does the patient have evidence of cognitive impairment? No    Physical Exam  Vitals reviewed.   Constitutional:       Appearance: He is well-developed.   HENT:      Head: Normocephalic and atraumatic.   Neck:      Vascular: No carotid bruit.   Cardiovascular:      Rate and Rhythm: Normal rate and regular rhythm.      Heart sounds: Normal heart sounds, S1 normal and S2 normal.   Pulmonary:      Effort: Pulmonary effort is normal.      Breath " sounds: Normal breath sounds.   Skin:     General: Skin is warm.   Neurological:      Mental Status: He is alert.   Psychiatric:         Behavior: Behavior normal.                 HEALTH RISK ASSESSMENT    Smoking Status:  Social History     Tobacco Use   Smoking Status Former Smoker   • Packs/day: 0.50   • Years: 20.00   • Pack years: 10.00   • Types: Cigarettes   • Quit date: 1988   • Years since quittin.1   Smokeless Tobacco Never Used     Alcohol Consumption:  Social History     Substance and Sexual Activity   Alcohol Use Yes   • Alcohol/week: 0.0 - 1.0 standard drinks    Comment: rare social     Fall Risk Screen:    STEADI Fall Risk Assessment was completed, and patient is at LOW risk for falls.Assessment completed on:2/15/2022    Depression Screening:  PHQ-2/PHQ-9 Depression Screening 2/15/2022   Little interest or pleasure in doing things 0   Feeling down, depressed, or hopeless 0   Total Score 0       Health Habits and Functional and Cognitive Screening:  Functional & Cognitive Status 2/15/2022   Do you have difficulty preparing food and eating? No   Do you have difficulty bathing yourself, getting dressed or grooming yourself? No   Do you have difficulty using the toilet? No   Do you have difficulty moving around from place to place? No   Do you have trouble with steps or getting out of a bed or a chair? No   Current Diet Well Balanced Diet   Dental Exam Up to date   Eye Exam Up to date   Exercise (times per week) 7 times per week   Current Exercises Include Walking   Current Exercise Activities Include -   Do you need help using the phone?  No   Are you deaf or do you have serious difficulty hearing?  No   Do you need help with transportation? No   Do you need help shopping? No   Do you need help preparing meals?  No   Do you need help with housework?  No   Do you need help with laundry? No   Do you need help taking your medications? No   Do you need help managing money? No   Do you ever drive or  ride in a car without wearing a seat belt? No   Have you felt unusual stress, anger or loneliness in the last month? No   Who do you live with? Spouse   If you need help, do you have trouble finding someone available to you? No   Have you been bothered in the last four weeks by sexual problems? No   Do you have difficulty concentrating, remembering or making decisions? -       Age-appropriate Screening Schedule:  Refer to the list below for future screening recommendations based on patient's age, sex and/or medical conditions. Orders for these recommended tests are listed in the plan section. The patient has been provided with a written plan.    Health Maintenance   Topic Date Due   • ZOSTER VACCINE (1 of 2) Never done   • LIPID PANEL  06/10/2022   • TDAP/TD VACCINES (2 - Td or Tdap) 09/18/2028   • INFLUENZA VACCINE  Completed              Assessment/Plan   CMS Preventative Services Quick Reference  Risk Factors Identified During Encounter  Immunizations Discussed/Encouraged (specific Immunizations; Shingrix  The above risks/problems have been discussed with the patient.  Follow up actions/plans if indicated are seen below in the Assessment/Plan Section.  Pertinent information has been shared with the patient in the After Visit Summary.    Diagnoses and all orders for this visit:    1. Medicare annual wellness visit, subsequent (Primary)    2. Stage 3a chronic kidney disease (HCC)  -     Comprehensive Metabolic Panel; Future    3. Essential hypertension    4. Other hyperlipidemia  -     Lipid Panel With / Chol / HDL Ratio; Future    5. Coronary artery disease involving native coronary artery of native heart without angina pectoris    6. Screening for prostate cancer  -     PSA Screen; Future        Follow Up:   Return in about 6 months (around 8/15/2022) for Lab Before FUP.     An After Visit Summary and PPPS were made available to the patient.               Reviewed bmp and CT from last fall. BP is good.

## 2022-04-23 DIAGNOSIS — I51.9 HEART DISEASE: ICD-10-CM

## 2022-04-25 RX ORDER — RANOLAZINE 1000 MG/1
TABLET, EXTENDED RELEASE ORAL
Qty: 180 TABLET | Refills: 3 | Status: SHIPPED | OUTPATIENT
Start: 2022-04-25

## 2022-05-16 RX ORDER — ISOSORBIDE MONONITRATE 30 MG/1
TABLET, EXTENDED RELEASE ORAL
Qty: 90 TABLET | Refills: 3 | Status: SHIPPED | OUTPATIENT
Start: 2022-05-16

## 2022-06-06 DIAGNOSIS — I10 ESSENTIAL HYPERTENSION: ICD-10-CM

## 2022-06-06 RX ORDER — LOSARTAN POTASSIUM 50 MG/1
50 TABLET ORAL DAILY
Qty: 90 TABLET | Refills: 3 | Status: SHIPPED | OUTPATIENT
Start: 2022-06-06

## 2022-07-08 RX ORDER — FUROSEMIDE 40 MG/1
40 TABLET ORAL DAILY
Qty: 90 TABLET | Refills: 3 | Status: SHIPPED | OUTPATIENT
Start: 2022-07-08 | End: 2022-10-03

## 2022-07-26 ENCOUNTER — OFFICE VISIT (OUTPATIENT)
Dept: CARDIOLOGY | Facility: CLINIC | Age: 82
End: 2022-07-26

## 2022-07-26 VITALS
HEART RATE: 67 BPM | WEIGHT: 199 LBS | BODY MASS INDEX: 27.86 KG/M2 | DIASTOLIC BLOOD PRESSURE: 80 MMHG | OXYGEN SATURATION: 97 % | HEIGHT: 71 IN | SYSTOLIC BLOOD PRESSURE: 142 MMHG

## 2022-07-26 DIAGNOSIS — G47.33 OSA ON CPAP: ICD-10-CM

## 2022-07-26 DIAGNOSIS — Z99.89 OSA ON CPAP: ICD-10-CM

## 2022-07-26 DIAGNOSIS — I25.10 CORONARY ARTERY DISEASE INVOLVING NATIVE CORONARY ARTERY OF NATIVE HEART WITHOUT ANGINA PECTORIS: Primary | ICD-10-CM

## 2022-07-26 DIAGNOSIS — I10 ESSENTIAL HYPERTENSION: ICD-10-CM

## 2022-07-26 DIAGNOSIS — Z95.1 HX OF CABG: ICD-10-CM

## 2022-07-26 DIAGNOSIS — E78.49 OTHER HYPERLIPIDEMIA: ICD-10-CM

## 2022-07-26 PROCEDURE — 93000 ELECTROCARDIOGRAM COMPLETE: CPT | Performed by: NURSE PRACTITIONER

## 2022-07-26 PROCEDURE — 99214 OFFICE O/P EST MOD 30 MIN: CPT | Performed by: NURSE PRACTITIONER

## 2022-07-26 NOTE — PROGRESS NOTES
"    CARDIOLOGY        Patient Name: Kenn Brito  :1940  Age: 81 y.o.  Primary Cardiologist: Sam Wright MD  Encounter Provider:  FALLON Liu    Date of Service: 22            CHIEF COMPLAINT / REASON FOR OFFICE VISIT     Coronary Artery Disease (1 yr f/u)      HISTORY OF PRESENT ILLNESS       HPI  Kenn Brito is a 81 y.o. male who presents today for annual follow up.     Pt has a  history significant for CAD, hyperlipidemia, PVC, hypertension, stage III renal disease.    Patient reports that he has done well over the past year.  He denies chest pain, shortness of breath, palpitations, lightheadedness, fatigue, or edema.  He remains active and does not have any limiting symptoms.  He is tolerating all medications without adverse effects.  He does bruise easily and is questioning if he can come off Plavix.     The following portions of the patient's history were reviewed and updated as appropriate: allergies, current medications, past family history, past medical history, past social history, past surgical history and problem list.      VITAL SIGNS     Visit Vitals  /80 (BP Location: Left arm, Patient Position: Sitting, Cuff Size: Adult)   Pulse 67   Ht 180.3 cm (71\")   Wt 90.3 kg (199 lb)   SpO2 97%   BMI 27.75 kg/m²         Wt Readings from Last 3 Encounters:   22 90.3 kg (199 lb)   02/15/22 89.4 kg (197 lb)   21 92 kg (202 lb 12.8 oz)     Body mass index is 27.75 kg/m².      REVIEW OF SYSTEMS   Review of Systems   Constitutional: Negative for chills, fever, weight gain and weight loss.   Cardiovascular: Negative for leg swelling.   Respiratory: Negative for cough, snoring and wheezing.    Hematologic/Lymphatic: Negative for bleeding problem. Does not bruise/bleed easily.   Skin: Negative for color change.   Musculoskeletal: Negative for falls, joint pain and myalgias.   Gastrointestinal: Negative for melena.   Genitourinary: Negative for hematuria. "   Neurological: Negative for excessive daytime sleepiness.   Psychiatric/Behavioral: Negative for depression. The patient is not nervous/anxious.            PHYSICAL EXAMINATION     Constitutional:       Appearance: Normal appearance. Well-developed.   Eyes:      Conjunctiva/sclera: Conjunctivae normal.   Neck:      Vascular: No carotid bruit.   Pulmonary:      Effort: Pulmonary effort is normal.      Breath sounds: Normal breath sounds.   Cardiovascular:      Normal rate. Regular rhythm. Normal S1. Normal S2.      Murmurs: There is no murmur.      No gallop. No click. No rub.   Edema:     Peripheral edema absent.   Musculoskeletal: Normal range of motion. Skin:     General: Skin is warm and dry.   Neurological:      Mental Status: Alert and oriented to person, place, and time.      GCS: GCS eye subscore is 4. GCS verbal subscore is 5. GCS motor subscore is 6.   Psychiatric:         Speech: Speech normal.         Behavior: Behavior normal.         Thought Content: Thought content normal.         Judgment: Judgment normal.           REVIEWED DATA       ECG 12 Lead    Date/Time: 7/26/2022 10:04 AM  Performed by: Yenny Tolliver APRN  Authorized by: Yenny Tolliver APRN   Comparison: compared with previous ECG from 5/12/2021  Rhythm: sinus rhythm  Ectopy: atrial premature contractions  Rate: normal  BPM: 62  ST Segments: ST segments normal  T Waves: T waves normal  QRS axis: normal    Clinical impression: abnormal EKG            Cardiac Procedures:  1. Myocardial perfusion PET stress/12/2021.  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  Additional medium sized, moderate to severe area of ischemia in the apex.  Impressions consistent with high risk study.  2. Cardiac catheterization 1/28/2021.  Left main 70% origin lesion with dampening of the catheter.  LAD 10-20% proximal disease, normal at midportion and distally there is first diagonal that is occluded.  Ramus is a large vessel  with 30-40% disease.  Circumflex a small vessel with 70-80% lesion in the midportion not amenable to PCI.  RCA is dominant vessel 100% occluded.  LIMA widely patent not used his bypass.  SVG to diagonal was occluded.  SVG to OM1 is occluded.  SVG to RCA is widely patent but the distal portion there is a 70-80% degenerated lesion, native RCA has some luminal irregularities.  Patient treated with PCI and RANCHO.  3. ZIO monitor 2/24/2021.  Predominant rhythm was sinus.  PACs occurred occasionally.  PVCs occurred frequently with ventricular couplets bigeminy and trigeminy.  There was a 4 beat episode of nonsustained V. tach.  4 pauses with the longest pause lasting 5.3 seconds.  4. Cardiac catheterization 5/11/2021.  Left main.  It previously placed stent with a 10-20% in-stent waist.  LAD with 20% mid vessel stenosis.  Very small caliber D2 branch with discrete 90% ostial stenosis.  Left circumflex with  mid segment.  Fills via left to left collaterals.  OM1 small caliber with discrete 80% stenosis.  RCA  proximal segment with discrete 95% distal stenosis that is ulcerated with KIERA II flow.  SVG to RCA is normal.  SVG to diagonal and OM are known to be occluded.  LIMA not used.  Successful PCI of the distal RCA through the SVG to RCA graft with RANCHO.        BUN   Date Value Ref Range Status   02/11/2022 30 (H) 8 - 27 mg/dL Final   05/12/2021 23 8 - 23 mg/dL Final   12/28/2019 37 (H) 7 - 20 mg/dL Final     Creatinine   Date Value Ref Range Status   02/11/2022 1.43 (H) 0.76 - 1.27 mg/dL Final   05/12/2021 1.24 0.76 - 1.27 mg/dL Final   12/28/2019 1.5 0.7 - 1.5 mg/dL Final     Potassium   Date Value Ref Range Status   02/11/2022 4.4 3.5 - 5.2 mmol/L Final   05/12/2021 4.4 3.5 - 5.2 mmol/L Final     Comment:     Specimen hemolyzed.  Results may be affected.   12/28/2019 4.4 3.5 - 5.1 mmol/L Final     ALT (SGPT)   Date Value Ref Range Status   06/10/2021 18 1 - 41 U/L Final   05/11/2021 15 1 - 41 U/L Final   10/18/2019  24 13 - 69 U/L Final     AST (SGOT)   Date Value Ref Range Status   06/10/2021 18 1 - 40 U/L Final   05/11/2021 22 1 - 40 U/L Final   10/18/2019 21 15 - 46 U/L Final           ASSESSMENT & PLAN     Diagnoses and all orders for this visit:    1. Coronary artery disease involving native coronary artery of native heart without angina pectoris (Primary)  2. Hx of CABG  · Also history of stenting to the bypass graft in 2021  · Denies angina or dyspnea  · Some bruising with DAPT although we discussed the necessity to remain on DAPT indefinitely unless there are contraindications.  Patient agreeable.  Continue aspirin and Plavix  · Continue GDMT with atorvastatin, Imdur, losartan, Ranexa    3. Essential hypertension  · BP controlled today at 142/80  · Continue losartan 50 mg/day, furosemide 40 mg/day    4. EVELINA on CPAP    5. Other hyperlipidemia  · Continue atorvastatin      Follow-up with Dr. Wright in 1 year    Future Appointments       Provider Department Center    8/23/2022 8:00 AM [facility] PRIMARY [facility]ARE [facility]    8/30/2022 8:15 AM Dante Tabor MD [facility] PRIMARY CARE [facility]    7/26/2023 8:20 AM Sam Wright MD [facility] CARDIOLOGY [facility]                MEDICATIONS         Discharge Medications          Accurate as of July 26, 2022  3:17 PM. If you have any questions, ask your nurse or doctor.            Continue These Medications      Instructions Start Date   aspirin 81 MG EC tablet   81 mg, Oral, Daily      atorvastatin 80 MG tablet  Commonly known as: LIPITOR   TAKE 1 TABLET BY MOUTH EVERY DAY      clopidogrel 75 MG tablet  Commonly known as: PLAVIX   75 mg, Oral, Daily      furosemide 40 MG tablet  Commonly known as: LASIX   40 mg, Oral, Daily      isosorbide mononitrate 30 MG 24 hr tablet  Commonly known as: IMDUR   TAKE 1 TABLET BY MOUTH DAILY      losartan 50 MG tablet  Commonly known as: COZAAR   50 mg, Oral, Daily       nitroglycerin 0.4 MG SL tablet  Commonly known as: NITROSTAT   0.4 mg, Sublingual, As Needed      Omega-3 1000 MG capsule   1,000 mg, Oral, Daily      pantoprazole 40 MG EC tablet  Commonly known as: Protonix   40 mg, Oral, Daily      ranolazine 1000 MG 12 hr tablet  Commonly known as: RANEXA   TAKE 1 TABLET BY MOUTH EVERY 12 HOURS      Vitamin D3 50 MCG (2000 UT) tablet   2,000 Units, Oral, Daily                 **Dragon Disclaimer:   Much of this encounter note is an electronic transcription/translation of spoken language to printed text. The electronic translation of spoken language may permit erroneous, or at times, nonsensical words or phrases to be inadvertently transcribed. Although I have reviewed the note for such errors, some may still exist.

## 2022-08-05 RX ORDER — PANTOPRAZOLE SODIUM 40 MG/1
40 TABLET, DELAYED RELEASE ORAL DAILY
Qty: 30 TABLET | Refills: 6 | Status: SHIPPED | OUTPATIENT
Start: 2022-08-05

## 2022-08-30 ENCOUNTER — OFFICE VISIT (OUTPATIENT)
Dept: INTERNAL MEDICINE | Facility: CLINIC | Age: 82
End: 2022-08-30

## 2022-08-30 VITALS
WEIGHT: 198 LBS | BODY MASS INDEX: 27.72 KG/M2 | HEIGHT: 71 IN | TEMPERATURE: 97.8 F | SYSTOLIC BLOOD PRESSURE: 122 MMHG | DIASTOLIC BLOOD PRESSURE: 82 MMHG

## 2022-08-30 DIAGNOSIS — N18.31 STAGE 3A CHRONIC KIDNEY DISEASE: Chronic | ICD-10-CM

## 2022-08-30 DIAGNOSIS — E78.49 OTHER HYPERLIPIDEMIA: Primary | Chronic | ICD-10-CM

## 2022-08-30 DIAGNOSIS — R91.8 MULTIPLE LUNG NODULES ON CT: ICD-10-CM

## 2022-08-30 DIAGNOSIS — I10 ESSENTIAL HYPERTENSION: Chronic | ICD-10-CM

## 2022-08-30 DIAGNOSIS — I25.10 CORONARY ARTERY DISEASE INVOLVING NATIVE CORONARY ARTERY OF NATIVE HEART WITHOUT ANGINA PECTORIS: ICD-10-CM

## 2022-08-30 PROCEDURE — 99214 OFFICE O/P EST MOD 30 MIN: CPT | Performed by: INTERNAL MEDICINE

## 2022-08-30 NOTE — PROGRESS NOTES
Subjective        Chief Complaint   Patient presents with   • Hyperlipidemia           Kenn Brito is a 81 y.o. male who presents for    Patient Active Problem List   Diagnosis   • CAD (coronary artery disease)   • GERD (gastroesophageal reflux disease)   • Other hyperlipidemia   • Essential hypertension   • EVELINA on CPAP   • Multiple lung nodules on CT   • PVC (premature ventricular contraction)   • Coronary artery disease involving native coronary artery of native heart without angina pectoris   • Sinus pause   • Stage 3a chronic kidney disease (HCC)       History of Present Illness     He denies reflux, chest pain or dyspnea. He has not been checking his BP. He uses his CPAP.  No Known Allergies    Current Outpatient Medications on File Prior to Visit   Medication Sig Dispense Refill   • aspirin 81 MG EC tablet Take 81 mg by mouth Daily.     • atorvastatin (LIPITOR) 80 MG tablet TAKE 1 TABLET BY MOUTH EVERY DAY 90 tablet 3   • Cholecalciferol (VITAMIN D3) 2000 units tablet Take 2,000 Units by mouth Daily.     • clopidogrel (PLAVIX) 75 MG tablet TAKE 1 TABLET BY MOUTH DAILY 30 tablet 11   • furosemide (LASIX) 40 MG tablet TAKE 1 TABLET BY MOUTH DAILY 90 tablet 3   • isosorbide mononitrate (IMDUR) 30 MG 24 hr tablet TAKE 1 TABLET BY MOUTH DAILY 90 tablet 3   • losartan (COZAAR) 50 MG tablet TAKE 1 TABLET BY MOUTH DAILY 90 tablet 3   • nitroglycerin (NITROSTAT) 0.4 MG SL tablet Place 1 tablet under the tongue As Needed for Chest Pain. 25 tablet 1   • Omega-3 1000 MG capsule Take 1,000 mg by mouth Daily.     • pantoprazole (PROTONIX) 40 MG EC tablet TAKE 1 TABLET BY MOUTH DAILY 30 tablet 6   • ranolazine (RANEXA) 1000 MG 12 hr tablet TAKE 1 TABLET BY MOUTH EVERY 12 HOURS 180 tablet 3     No current facility-administered medications on file prior to visit.       Past Medical History:   Diagnosis Date   • GERD (gastroesophageal reflux disease)    • Herpes zoster    • Hypercalcemia    • Increased PTH level 5/20/2019    • Renal cyst    • Sciatica    • Tubular adenoma of colon 2014    X3       Past Surgical History:   Procedure Laterality Date   • CARDIAC CATHETERIZATION  03/2016   • CARDIAC CATHETERIZATION Left 03/26/2013    x1 stent [facility], Dr. Andrei Lutz   • CARDIAC CATHETERIZATION N/A 1/28/2021    Procedure: Coronary angiography;  Surgeon: Jaguar Umana MD;  Location:  [facility] CATH INVASIVE LOCATION;  Service: Cardiovascular;  Laterality: N/A;   • CARDIAC CATHETERIZATION N/A 1/28/2021    Procedure: Left heart cath;  Surgeon: Jaguar Umana MD;  Location:  [facility] C[facility]VASIVE [facility];  Service: Cardiovascular;  Laterality: N/A;   • CARDIAC CATHETERIZATION N/A 1/28/2021    Procedure: Left ventriculography;  Surgeon: Jaguar Umana MD;  Location:  [facility] C[facility] INV[facility];  Service: Cardiovascular;  Laterality: N/A;   • CARDIAC CATHETERIZATION N/A 1/28/2021    Procedure: Saphenous Vein Graft;  Surgeon: Jaguar Umana MD;  [facility];  Service: Cardiovascular;  Laterality: N/A;   • CARDIAC CATHETERIZATION N/A 1/28/2021    Procedure: Resting Full Cycle Ratio;  Surgeon: Jaguar Umana MD;  Location:  [facility] C[facility] INVAS[facility];  Service: Cardiovascular;  Laterality: N/A;   • CARDIAC CATHETERIZATION N/A 1/28/2021    Procedure: Stent RANCHO coronary;  Surgeon: Jaguar Umana MD;  [facility];  Service: Cardiovascular;  Laterality: N/A;   • CARDIAC CATHETERIZATION N/A 1/28/2021    Procedure: Stent RANCHO bypass graft-RCA;  Surgeon: Jaguar Umana MD;  [facility];  Service: Cardiovascular;  Laterality: N/A;   • CARDIAC CATHETERIZATION N/A 5/11/2021    Procedure: CORONARY ANGIOGRAPHY;  Surgeon: Freedom Mccollum MD;  [facility];  Service: Cardiovascular;  Laterality: N/A;   • CARDIAC CATHETERIZATION N/A 5/11/2021    Procedure: Stent RANCHO bypass graft;  Surgeon: Freedom Mccollum  MD;  Location:  [facility];  Service: Cardiovascular;  Laterality: N/A;   • CARDIAC CATHETERIZATION  2021    Procedure: Saphenous Vein Graft;  Surgeon: Freedom Mccollum MD;  Location:  [facility] C[facility];  Service: Cardiovascular;;   • COLONOSCOPY  10/29/2014   • CORONARY ARTERY BYPASS GRAFT  2000    x3 vessel   • INTERVENTIONAL RADIOLOGY PROCEDURE N/A 2021    Procedure: Intravascular Ultrasound;  Surgeon: Jaguar Umana MD;  Location:  [facility] C[facility] INVASIVE LOC[facility];  Service: Cardiovascular;  Laterality: N/A;   • REPLACEMENT TOTAL KNEE Right 2019       Family History   Problem Relation Age of Onset   • Throat cancer Father    • Peripheral vascular disease Brother    • Diabetes Brother    • Heart disease Brother    • Crohn's disease Daughter        Social History     Socioeconomic History   • Marital status:    Tobacco Use   • Smoking status: Former Smoker     Packs/day: 0.50     Years: 20.00     Pack years: 10.00     Types: Cigarettes     Quit date: 1988     Years since quittin.6   • Smokeless tobacco: Never Used   Substance and Sexual Activity   • Alcohol use: Yes     Alcohol/week: 0.0 - 1.0 standard drinks     Comment: rare social   • Drug use: No   • Sexual activity: Defer           The following portions of the patient's history were reviewed and updated as appropriate: problem list, allergies, current medications, past medical history, past family history, past social history and past surgical history.    Review of Systems    Immunization History   Administered Date(s) Administered   • COVID-19 (MODERNA) 1st, 2nd, 3rd Dose Only 2021, 2021, 10/26/2021   • Fluad Quad 65+ 09/10/2020, 10/14/2021   • Fluzone High Dose =>65 Years ([employer] ONLY) 2018   • Pneumococcal Conjugate 13-Valent (PCV13) 2018   • Pneumococcal Polysaccharide (PPSV23) 2019   • Tdap 2018       Objective   Vitals:    22 0806   BP: 122/82  "  Temp: 97.8 °F (36.6 °C)   Weight: 89.8 kg (198 lb)   Height: 180.3 cm (71\")     Body mass index is 27.62 kg/m².  Physical Exam  Vitals reviewed.   Constitutional:       Appearance: He is well-developed.   HENT:      Head: Normocephalic and atraumatic.   Cardiovascular:      Rate and Rhythm: Normal rate and regular rhythm.      Heart sounds: Normal heart sounds, S1 normal and S2 normal.   Pulmonary:      Effort: Pulmonary effort is normal.      Breath sounds: Normal breath sounds.   Skin:     General: Skin is warm.   Neurological:      Mental Status: He is alert.   Psychiatric:         Behavior: Behavior normal.         Procedures    Assessment & Plan   Diagnoses and all orders for this visit:    1. Other hyperlipidemia (Primary)    2. Essential hypertension    3. Coronary artery disease involving native coronary artery of native heart without angina pectoris    4. Stage 3a chronic kidney disease (HCC)  -     Basic Metabolic Panel; Future    5. Multiple lung nodules on CT  -     CT Chest Without Contrast Diagnostic; Future      Reviewed psa., cmp and flp. LDL is at goal. Discussed exercising to increase HDL.  BP is good. Modify coronary risk factors. Repeat CT chest in the fall.           Return in about 6 months (around 2/28/2023) for Medicare Wellness, Lab Before FUP.  "

## 2022-10-03 RX ORDER — FUROSEMIDE 40 MG/1
40 TABLET ORAL DAILY
Qty: 90 TABLET | Refills: 3 | Status: SHIPPED | OUTPATIENT
Start: 2022-10-03

## 2022-10-10 ENCOUNTER — CLINICAL SUPPORT (OUTPATIENT)
Dept: INTERNAL MEDICINE | Facility: CLINIC | Age: 82
End: 2022-10-10

## 2022-10-10 DIAGNOSIS — Z23 FLU VACCINE NEED: Primary | ICD-10-CM

## 2022-10-10 PROCEDURE — 90662 IIV NO PRSV INCREASED AG IM: CPT | Performed by: INTERNAL MEDICINE

## 2022-10-10 PROCEDURE — G0008 ADMIN INFLUENZA VIRUS VAC: HCPCS | Performed by: INTERNAL MEDICINE

## 2022-10-31 ENCOUNTER — OFFICE VISIT (OUTPATIENT)
Dept: INTERNAL MEDICINE | Facility: CLINIC | Age: 82
End: 2022-10-31

## 2022-10-31 VITALS — WEIGHT: 198.2 LBS | BODY MASS INDEX: 27.75 KG/M2 | TEMPERATURE: 97.3 F | HEIGHT: 71 IN | RESPIRATION RATE: 18 BRPM

## 2022-10-31 DIAGNOSIS — R35.0 URINARY FREQUENCY: Primary | ICD-10-CM

## 2022-10-31 LAB
BILIRUB BLD-MCNC: NEGATIVE MG/DL
CLARITY, POC: CLEAR
COLOR UR: YELLOW
EXPIRATION DATE: NORMAL
GLUCOSE UR STRIP-MCNC: NEGATIVE MG/DL
KETONES UR QL: NEGATIVE
LEUKOCYTE EST, POC: NEGATIVE
Lab: NORMAL
NITRITE UR-MCNC: NEGATIVE MG/ML
PH UR: 7 [PH] (ref 5–8)
PROT UR STRIP-MCNC: NEGATIVE MG/DL
RBC # UR STRIP: NEGATIVE /UL
SP GR UR: 1.01 (ref 1–1.03)
UROBILINOGEN UR QL: NORMAL

## 2022-10-31 PROCEDURE — 99213 OFFICE O/P EST LOW 20 MIN: CPT | Performed by: INTERNAL MEDICINE

## 2022-10-31 PROCEDURE — 81003 URINALYSIS AUTO W/O SCOPE: CPT | Performed by: INTERNAL MEDICINE

## 2022-10-31 RX ORDER — CIPROFLOXACIN 250 MG/1
250 TABLET, FILM COATED ORAL 2 TIMES DAILY
Qty: 20 TABLET | Refills: 0 | Status: SHIPPED | OUTPATIENT
Start: 2022-10-31 | End: 2022-11-10

## 2022-10-31 RX ORDER — KETOROLAC TROMETHAMINE 5 MG/ML
SOLUTION OPHTHALMIC
COMMUNITY
Start: 2022-10-30 | End: 2023-03-20

## 2022-10-31 NOTE — PROGRESS NOTES
Subjective        Chief Complaint   Patient presents with   • Urinary Frequency           Kenn Brito is a 81 y.o. male who presents for    Patient Active Problem List   Diagnosis   • CAD (coronary artery disease)   • GERD (gastroesophageal reflux disease)   • Other hyperlipidemia   • Essential hypertension   • EVELINA on CPAP   • Multiple lung nodules on CT   • PVC (premature ventricular contraction)   • Coronary artery disease involving native coronary artery of native heart without angina pectoris   • Sinus pause   • Stage 3a chronic kidney disease (HCC)       History of Present Illness     He has had dysuria since Saturday. He has had some frequency without fever, chills, nausea, or hematuria.  No Known Allergies    Current Outpatient Medications on File Prior to Visit   Medication Sig Dispense Refill   • aspirin 81 MG EC tablet Take 81 mg by mouth Daily.     • atorvastatin (LIPITOR) 80 MG tablet TAKE 1 TABLET BY MOUTH EVERY DAY 90 tablet 3   • Cholecalciferol (VITAMIN D3) 2000 units tablet Take 2,000 Units by mouth Daily.     • clopidogrel (PLAVIX) 75 MG tablet TAKE 1 TABLET BY MOUTH DAILY 30 tablet 11   • furosemide (LASIX) 40 MG tablet TAKE 1 TABLET BY MOUTH DAILY 90 tablet 3   • isosorbide mononitrate (IMDUR) 30 MG 24 hr tablet TAKE 1 TABLET BY MOUTH DAILY 90 tablet 3   • ketorolac (ACULAR) 0.5 % ophthalmic solution INSTILL 1 DROP IN RIGHT EYE FOUR TIMES DAILY. START DROP 3 DAYS BEFORE SURGERY AND. CONTINUE ACCORDING TO POST-OP INSTRUCTIONS     • losartan (COZAAR) 50 MG tablet TAKE 1 TABLET BY MOUTH DAILY 90 tablet 3   • nitroglycerin (NITROSTAT) 0.4 MG SL tablet Place 1 tablet under the tongue As Needed for Chest Pain. 25 tablet 1   • Omega-3 1000 MG capsule Take 1,000 mg by mouth Daily.     • pantoprazole (PROTONIX) 40 MG EC tablet TAKE 1 TABLET BY MOUTH DAILY 30 tablet 6   • ranolazine (RANEXA) 1000 MG 12 hr tablet TAKE 1 TABLET BY MOUTH EVERY 12 HOURS 180 tablet 3     No current facility-administered  medications on file prior to visit.       Past Medical History:   Diagnosis Date   • GERD (gastroesophageal reflux disease)    • Herpes zoster    • Hypercalcemia    • Increased PTH level 5/20/2019   • Renal cyst    • Sciatica    • Tubular adenoma of colon 2014    X3       Past Surgical History:   Procedure Laterality Date   • CARDIAC CATHETERIZATION  03/2016   • CARDIAC CATHETERIZATION Left 03/26/2013    x1 stent [facility], Dr. Andrei Lutz   • CARDIAC CATHETERIZATION N/A 1/28/2021    Procedure: Coronary angiography;  Surgeon: Jaguar Umana MD;  Location:  [facility] C[facility]VAS[facility];  Service: Cardiovascular;  Laterality: N/A;   • CARDIAC CATHETERIZATION N/A 1/28/2021    Procedure: Left heart cath;  Surgeon: Jaguar Umana MD;  Location:  [facility];  Service: Cardiovascular;  Laterality: N/A;   • CARDIAC CATHETERIZATION N/A 1/28/2021    Procedure: Left ventriculography;  Surgeon: Jaguar Umana MD;  Location:  [facility];  Service: Cardiovascular;  Laterality: N/A;   • CARDIAC CATHETERIZATION N/A 1/28/2021    Procedure: Saphenous Vein Graft;  Surgeon: Jaguar Umana MD;  [facility];  Service: Cardiovascular;  Laterality: N/A;   • CARDIAC CATHETERIZATION N/A 1/28/2021    Procedure: Resting Full Cycle Ratio;  Surgeon: Jaguar Umana MD;  [facility];  Service: Cardiovascular;  Laterality: N/A;   • CARDIAC CATHETERIZATION N/A 1/28/2021    Procedure: Stent RANCHO coronary;  Surgeon: Jaguar Umana MD;  [facility];  Service: Cardiovascular;  Laterality: N/A;   • CARDIAC CATHETERIZATION N/A 1/28/2021    Procedure: Stent RANCHO bypass graft-RCA;  Surgeon: Jaguar Umana MD;  [facility];  Service: Cardiovascular;  Laterality: N/A;   • CARDIAC CATHETERIZATION N/A 5/11/2021    Procedure: CORONARY ANGIOGRAPHY;  Surgeon: Freedom Mccollum MD;   Location:  [facility];  Service: Cardiovascular;  Laterality: N/A;   • CARDIAC CATHETERIZATION N/A 2021    Procedure: Stent RANCHO bypass graft;  Surgeon: Freedom Mccollum MD;  Location:  [facility];  Service: Cardiovascular;  Laterality: N/A;   • CARDIAC CATHETERIZATION  2021    Procedure: Saphenous Vein Graft;  Surgeon: Freedom Mccollum MD;  Location:  [facility];  Service: Cardiovascular;;   • COLONOSCOPY  10/29/2014   • CORONARY ARTERY BYPASS GRAFT  2000    x3 vessel   • INTERVENTIONAL RADIOLOGY PROCEDURE N/A 2021    Procedure: Intravascular Ultrasound;  Surgeon: Jaguar Umana MD;  Location:  [facility] C[facility];  Service: Cardiovascular;  Laterality: N/A;   • REPLACEMENT TOTAL KNEE Right 2019       Family History   Problem Relation Age of Onset   • Throat cancer Father    • Peripheral vascular disease Brother    • Diabetes Brother    • Heart disease Brother    • Crohn's disease Daughter        Social History     Socioeconomic History   • Marital status:    Tobacco Use   • Smoking status: Former     Packs/day: 0.50     Years: 20.00     Pack years: 10.00     Types: Cigarettes     Quit date: 1988     Years since quittin.8   • Smokeless tobacco: Never   Vaping Use   • Vaping Use: Never used   Substance and Sexual Activity   • Alcohol use: Yes     Alcohol/week: 0.0 - 1.0 standard drinks     Comment: rare social   • Drug use: No   • Sexual activity: Defer           The following portions of the patient's history were reviewed and updated as appropriate: problem list, allergies, current medications, past medical history, past family history, past social history and past surgical history.    Review of Systems    Immunization History   Administered Date(s) Administered   • COVID-19 (MODERNA) 1st, 2nd, 3rd Dose Only 2021, 2021, 10/26/2021   • Fluad Quad 65+ 09/10/2020, 10/14/2021   • Fluzone High Dose  "=>65 Years (Vaxcare ONLY) 09/18/2018   • Fluzone High-Dose 65+yrs 10/10/2022   • Pneumococcal Conjugate 13-Valent (PCV13) 01/31/2018   • Pneumococcal Polysaccharide (PPSV23) 02/18/2019   • Tdap 09/18/2018       Objective   Vitals:    10/31/22 1019   Resp: 18   Temp: 97.3 °F (36.3 °C)   TempSrc: Temporal   Weight: 89.9 kg (198 lb 3.2 oz)   Height: 180.3 cm (71\")     Body mass index is 27.64 kg/m².  Physical Exam  Genitourinary:     Penis: Normal.       Prostate: Normal.      Rectum: Normal.      Comments: Slightly red skin on scrotum  Neurological:      Mental Status: He is alert and oriented to person, place, and time.   Psychiatric:         Mood and Affect: Mood normal.         Procedures    Assessment & Plan   Diagnoses and all orders for this visit:    1. Urinary frequency (Primary)  -     POCT urinalysis dipstick, automated  -     Urine Culture - Urine, Urine, Clean Catch  -     ciprofloxacin (Cipro) 250 MG tablet; Take 1 tablet by mouth 2 (Two) Times a Day.  Dispense: 20 tablet; Refill: 0               UA negative. Send urine for culture. Topical OTC for possible jock itch. Discussed risk with tendons and cipro.  No follow-ups on file.  "

## 2022-11-02 LAB
BACTERIA UR CULT: NORMAL
Lab: NORMAL
SPECIMEN STATUS: NORMAL

## 2022-11-10 ENCOUNTER — TELEPHONE (OUTPATIENT)
Dept: INTERNAL MEDICINE | Facility: CLINIC | Age: 82
End: 2022-11-10

## 2022-11-10 DIAGNOSIS — R35.0 URINARY FREQUENCY: ICD-10-CM

## 2022-11-10 RX ORDER — CIPROFLOXACIN 250 MG/1
TABLET, FILM COATED ORAL
Qty: 20 TABLET | Refills: 0 | Status: SHIPPED | OUTPATIENT
Start: 2022-11-10 | End: 2022-11-29

## 2022-11-10 NOTE — TELEPHONE ENCOUNTER
Called patient and he stated that he is feeling better but not 100% wanted to know if he could get another prescription

## 2022-11-17 ENCOUNTER — TELEPHONE (OUTPATIENT)
Dept: INTERNAL MEDICINE | Facility: CLINIC | Age: 82
End: 2022-11-17

## 2022-11-17 DIAGNOSIS — R35.0 URINARY FREQUENCY: Primary | ICD-10-CM

## 2022-11-17 NOTE — TELEPHONE ENCOUNTER
PATIENT'S WIFE MARIANA CALLED AND STATES HE HAS BEEN ON TWO ROUNDS OF ANTIBIOTICS FOR UTI, IT IS NOT GETTING ANY BETTER. AT LAST VISIT AN UROLOGY REFERRAL WAS DISCUSSED IF THE ANTIBIOTICS DID NOT WORK.    HE WOULD LIKE TO SEE AN UROLOGIST.     CALL BACK NUMBER [phone]

## 2022-11-28 ENCOUNTER — HOSPITAL ENCOUNTER (OUTPATIENT)
Dept: CT IMAGING | Facility: HOSPITAL | Age: 82
Discharge: HOME | End: 2022-11-28
Admitting: INTERNAL MEDICINE

## 2022-11-28 DIAGNOSIS — R91.8 MULTIPLE LUNG NODULES ON CT: ICD-10-CM

## 2022-11-28 PROCEDURE — 71250 CT THORAX DX C-: CPT

## 2022-11-29 ENCOUNTER — OFFICE VISIT (OUTPATIENT)
Dept: INTERNAL MEDICINE | Facility: CLINIC | Age: 82
End: 2022-11-29

## 2022-11-29 ENCOUNTER — TELEPHONE (OUTPATIENT)
Dept: INTERNAL MEDICINE | Facility: CLINIC | Age: 82
End: 2022-11-29

## 2022-11-29 VITALS
WEIGHT: 200 LBS | SYSTOLIC BLOOD PRESSURE: 124 MMHG | HEART RATE: 66 BPM | HEIGHT: 71 IN | TEMPERATURE: 97.8 F | BODY MASS INDEX: 28 KG/M2 | OXYGEN SATURATION: 98 % | DIASTOLIC BLOOD PRESSURE: 80 MMHG

## 2022-11-29 DIAGNOSIS — R10.9 ABDOMINAL PAIN, UNSPECIFIED ABDOMINAL LOCATION: Primary | ICD-10-CM

## 2022-11-29 PROCEDURE — 99213 OFFICE O/P EST LOW 20 MIN: CPT | Performed by: INTERNAL MEDICINE

## 2022-11-29 NOTE — TELEPHONE ENCOUNTER
Hub staff attempted to follow warm transfer process and was unsuccessful     Caller: Kenn Brito    Relationship to patient: Self    Best call back number: [phone]    Patient is needing: PATIENT AND WIFE CALLED ADALBERTO BACK STATING HE WILL BE HERE TODAY AT 3 PM FOR THE APPOINTMENT WITH DR SHANKAR.

## 2022-11-29 NOTE — PROGRESS NOTES
Subjective        Chief Complaint   Patient presents with   • GI Problem   • Abdominal Pain     Sx started sat            Kenn Brito is a 82 y.o. male who presents for    Patient Active Problem List   Diagnosis   • CAD (coronary artery disease)   • GERD (gastroesophageal reflux disease)   • Other hyperlipidemia   • Essential hypertension   • EVELINA on CPAP   • Multiple lung nodules on CT   • PVC (premature ventricular contraction)   • Coronary artery disease involving native coronary artery of native heart without angina pectoris   • Sinus pause   • Stage 3a chronic kidney disease (HCC)       History of Present Illness     He has had abdominal pain for 2 days.It is all over. Pepto bismol helps. Nothing makes it worse. He denies melena, hematochezia, diarrhea, fever or chills.  No Known Allergies    Current Outpatient Medications on File Prior to Visit   Medication Sig Dispense Refill   • aspirin 81 MG EC tablet Take 81 mg by mouth Daily.     • atorvastatin (LIPITOR) 80 MG tablet TAKE 1 TABLET BY MOUTH EVERY DAY 90 tablet 3   • Cholecalciferol (VITAMIN D3) 2000 units tablet Take 2,000 Units by mouth Daily.     • clopidogrel (PLAVIX) 75 MG tablet TAKE 1 TABLET BY MOUTH DAILY 30 tablet 11   • furosemide (LASIX) 40 MG tablet TAKE 1 TABLET BY MOUTH DAILY 90 tablet 3   • isosorbide mononitrate (IMDUR) 30 MG 24 hr tablet TAKE 1 TABLET BY MOUTH DAILY 90 tablet 3   • ketorolac (ACULAR) 0.5 % ophthalmic solution INSTILL 1 DROP IN RIGHT EYE FOUR TIMES DAILY. START DROP 3 DAYS BEFORE SURGERY AND. CONTINUE ACCORDING TO POST-OP INSTRUCTIONS     • losartan (COZAAR) 50 MG tablet TAKE 1 TABLET BY MOUTH DAILY 90 tablet 3   • nitroglycerin (NITROSTAT) 0.4 MG SL tablet Place 1 tablet under the tongue As Needed for Chest Pain. 25 tablet 1   • Omega-3 1000 MG capsule Take 1,000 mg by mouth Daily.     • pantoprazole (PROTONIX) 40 MG EC tablet TAKE 1 TABLET BY MOUTH DAILY 30 tablet 6   • ranolazine (RANEXA) 1000 MG 12 hr tablet TAKE 1  TABLET BY MOUTH EVERY 12 HOURS 180 tablet 3   • [DISCONTINUED] ciprofloxacin (CIPRO) 250 MG tablet TAKE 1 TABLET BY MOUTH TWICE DAILY 20 tablet 0     No current facility-administered medications on file prior to visit.       Past Medical History:   Diagnosis Date   • GERD (gastroesophageal reflux disease)    • Herpes zoster    • Hypercalcemia    • Increased PTH level 5/20/2019   • Renal cyst    • Sciatica    • Tubular adenoma of colon 2014    X3       Past Surgical History:   Procedure Laterality Date   • CARDIAC CATHETERIZATION  03/2016   • CARDIAC CATHETERIZATION Left 03/26/2013    x1 stent [facility], Dr. Andrei Lutz   • CARDIAC CATHETERIZATION N/A 1/28/2021    Procedure: Coronary angiography;  Surgeon: Jaguar Umana MD;  Location: [facility] CATH INVASIVE LOCATION;  Service: Cardiovascular;  Laterality: N/A;   • CARDIAC CATHETERIZATION N/A 1/28/2021    Procedure: Left heart cath;  Surgeon: Jaguar Umana MD;  Location: [facility] CATH INVASIVE LOCATION;  Service: Cardiovascular;  Laterality: N/A;   • CARDIAC CATHETERIZATION N/A 1/28/2021    Procedure: Left ventriculography;  Surgeon: Jaguar Umana MD;  Location: [facility] CATH INVASIVE LOCATION;  Service: Cardiovascular;  Laterality: N/A;   • CARDIAC CATHETERIZATION N/A 1/28/2021    Procedure: Saphenous Vein Graft;  Surgeon: Jaguar Umana MD;  Location: [facility] CATH INVASIVE LOCATION;  Service: Cardiovascular;  Laterality: N/A;   • CARDIAC CATHETERIZATION N/A 1/28/2021    Procedure: Resting Full Cycle Ratio;  Surgeon: Jaguar Umana MD;  Location: [facility] CATH INVASIVE LOCATION;  Service: Cardiovascular;  Laterality: N/A;   • CARDIAC CATHETERIZATION N/A 1/28/2021    Procedure: Stent RANCHO coronary;  Surgeon: Jaguar Umana MD;  Location: [facility] CATH INVASIVE LOCATION;  Service: Cardiovascular;  Laterality: N/A;   • CARDIAC CATHETERIZATION N/A 1/28/2021    Procedure: Stent RANCHO bypass graft-RCA;  Surgeon: Jaguar Umana MD;  Location: [facility]  CATH INVASIVE LOCATION;  Service: Cardiovascular;  Laterality: N/A;   • CARDIAC CATHETERIZATION N/A 2021    Procedure: CORONARY ANGIOGRAPHY;  Surgeon: Freedom Mccollum MD;  Location:  [facility] CATH INVAS[facility];  Service: Cardiovascular;  Laterality: N/A;   • CARDIAC CATHETERIZATION N/A 2021    Procedure: Stent RANCHO bypass graft;  Surgeon: Freedom Mccollum MD;  Location:  [facility] CATH INVASIVE LOC[facility];  Service: Cardiovascular;  Laterality: N/A;   • CARDIAC CATHETERIZATION  2021    Procedure: Saphenous Vein Graft;  Surgeon: Freedom Mccollum MD;  Location:  [facility] CATH INVASIVE LOC[facility];  Service: Cardiovascular;;   • COLONOSCOPY  10/29/2014   • CORONARY ARTERY BYPASS GRAFT  2000    x3 vessel   • INTERVENTIONAL RADIOLOGY PROCEDURE N/A 2021    Procedure: Intravascular Ultrasound;  Surgeon: Jaguar Umana MD;  Location: [facility] CATH INVASIVE LOCATION;  Service: Cardiovascular;  Laterality: N/A;   • REPLACEMENT TOTAL KNEE Right 2019       Family History   Problem Relation Age of Onset   • Throat cancer Father    • Peripheral vascular disease Brother    • Diabetes Brother    • Heart disease Brother    • Crohn's disease Daughter        Social History     Socioeconomic History   • Marital status:    Tobacco Use   • Smoking status: Former     Packs/day: 0.50     Years: 20.00     Pack years: 10.00     Types: Cigarettes     Quit date: 1988     Years since quittin.9   • Smokeless tobacco: Never   Vaping Use   • Vaping Use: Never used   Substance and Sexual Activity   • Alcohol use: Yes     Alcohol/week: 0.0 - 1.0 standard drinks     Comment: rare social   • Drug use: No   • Sexual activity: Defer           The following portions of the patient's history were reviewed and updated as appropriate: problem list, allergies, current medications, past medical history, past family history, past social history and past surgical history.    Review of Systems    Immunization  "History   Administered Date(s) Administered   • COVID-19 (MODERNA) 1st, 2nd, 3rd Dose Only 01/17/2021, 02/14/2021, 10/26/2021   • Fluad Quad 65+ 09/10/2020, 10/14/2021   • Fluzone High Dose =>65 Years (Vaxcare ONLY) 09/18/2018   • Fluzone High-Dose 65+yrs 10/10/2022   • Pneumococcal Conjugate 13-Valent (PCV13) 01/31/2018   • Pneumococcal Polysaccharide (PPSV23) 02/18/2019   • Tdap 09/18/2018       Objective   Vitals:    11/29/22 1454   BP: 124/80   Pulse: 66   Temp: 97.8 °F (36.6 °C)   SpO2: 98%   Weight: 90.7 kg (200 lb)   Height: 180.3 cm (70.98\")     Body mass index is 27.91 kg/m².  Physical Exam  Vitals reviewed.   Constitutional:       Appearance: He is well-developed.   HENT:      Head: Normocephalic and atraumatic.   Cardiovascular:      Rate and Rhythm: Normal rate and regular rhythm.      Heart sounds: Normal heart sounds, S1 normal and S2 normal.   Pulmonary:      Effort: Pulmonary effort is normal.      Breath sounds: Normal breath sounds.   Abdominal:      Palpations: Abdomen is soft. There is no mass.      Tenderness: There is no abdominal tenderness.   Skin:     General: Skin is warm.   Neurological:      Mental Status: He is alert.   Psychiatric:         Behavior: Behavior normal.         Procedures    Assessment & Plan   Diagnoses and all orders for this visit:    1. Abdominal pain, unspecified abdominal location (Primary)  -     CBC & Differential  -     Lipase  -     Comprehensive Metabolic Panel               Nl exam. Let's get some blood work. It is not affected by eating.  Return for Lab Today.  "

## 2022-11-29 NOTE — TELEPHONE ENCOUNTER
Patient's wife came in to the office and wanted to make an appointment for the patient, he is complaining of having stomach issues and is wanting to see Dr. Tabor as soon as possible, please call [phone].

## 2022-11-30 ENCOUNTER — TELEPHONE (OUTPATIENT)
Dept: INTERNAL MEDICINE | Facility: CLINIC | Age: 82
End: 2022-11-30

## 2022-11-30 ENCOUNTER — OFFICE VISIT (OUTPATIENT)
Dept: INTERNAL MEDICINE | Facility: CLINIC | Age: 82
End: 2022-11-30

## 2022-11-30 VITALS
SYSTOLIC BLOOD PRESSURE: 122 MMHG | BODY MASS INDEX: 28 KG/M2 | DIASTOLIC BLOOD PRESSURE: 70 MMHG | HEIGHT: 71 IN | WEIGHT: 200 LBS | TEMPERATURE: 98.2 F

## 2022-11-30 DIAGNOSIS — R19.5 DARK STOOLS: Primary | ICD-10-CM

## 2022-11-30 LAB
ALBUMIN SERPL-MCNC: 4 G/DL (ref 3.5–5.2)
ALBUMIN/GLOB SERPL: 2.2 G/DL
ALP SERPL-CCNC: 73 U/L (ref 39–117)
ALT SERPL-CCNC: 11 U/L (ref 1–41)
AST SERPL-CCNC: 20 U/L (ref 1–40)
BASOPHILS # BLD AUTO: 0.05 10*3/MM3 (ref 0–0.2)
BASOPHILS NFR BLD AUTO: 0.8 % (ref 0–1.5)
BILIRUB SERPL-MCNC: 0.5 MG/DL (ref 0–1.2)
BUN SERPL-MCNC: 21 MG/DL (ref 8–23)
BUN/CREAT SERPL: 14.7 (ref 7–25)
CALCIUM SERPL-MCNC: 9.4 MG/DL (ref 8.6–10.5)
CHLORIDE SERPL-SCNC: 104 MMOL/L (ref 98–107)
CO2 SERPL-SCNC: 24.5 MMOL/L (ref 22–29)
CREAT SERPL-MCNC: 1.43 MG/DL (ref 0.76–1.27)
EGFRCR SERPLBLD CKD-EPI 2021: 49.2 ML/MIN/1.73
EOSINOPHIL # BLD AUTO: 0.12 10*3/MM3 (ref 0–0.4)
EOSINOPHIL NFR BLD AUTO: 2 % (ref 0.3–6.2)
ERYTHROCYTE [DISTWIDTH] IN BLOOD BY AUTOMATED COUNT: 11.9 % (ref 12.3–15.4)
GLOBULIN SER CALC-MCNC: 1.8 GM/DL
GLUCOSE SERPL-MCNC: 103 MG/DL (ref 65–99)
HCT VFR BLD AUTO: 38.2 % (ref 37.5–51)
HGB BLD-MCNC: 12.7 G/DL (ref 13–17.7)
IMM GRANULOCYTES # BLD AUTO: 0.02 10*3/MM3 (ref 0–0.05)
IMM GRANULOCYTES NFR BLD AUTO: 0.3 % (ref 0–0.5)
LIPASE SERPL-CCNC: 37 U/L (ref 13–60)
LYMPHOCYTES # BLD AUTO: 1.38 10*3/MM3 (ref 0.7–3.1)
LYMPHOCYTES NFR BLD AUTO: 22.5 % (ref 19.6–45.3)
MCH RBC QN AUTO: 31.4 PG (ref 26.6–33)
MCHC RBC AUTO-ENTMCNC: 33.2 G/DL (ref 31.5–35.7)
MCV RBC AUTO: 94.3 FL (ref 79–97)
MONOCYTES # BLD AUTO: 0.69 10*3/MM3 (ref 0.1–0.9)
MONOCYTES NFR BLD AUTO: 11.2 % (ref 5–12)
NEUTROPHILS # BLD AUTO: 3.88 10*3/MM3 (ref 1.7–7)
NEUTROPHILS NFR BLD AUTO: 63.2 % (ref 42.7–76)
NRBC BLD AUTO-RTO: 0.2 /100 WBC (ref 0–0.2)
PLATELET # BLD AUTO: 177 10*3/MM3 (ref 140–450)
POTASSIUM SERPL-SCNC: 4.1 MMOL/L (ref 3.5–5.2)
PROT SERPL-MCNC: 5.8 G/DL (ref 6–8.5)
RBC # BLD AUTO: 4.05 10*6/MM3 (ref 4.14–5.8)
SODIUM SERPL-SCNC: 140 MMOL/L (ref 136–145)
WBC # BLD AUTO: 6.14 10*3/MM3 (ref 3.4–10.8)

## 2022-11-30 PROCEDURE — 99213 OFFICE O/P EST LOW 20 MIN: CPT | Performed by: PHYSICIAN ASSISTANT

## 2022-11-30 NOTE — TELEPHONE ENCOUNTER
ibeth     Caller: MARIANA CORTES    Relationship to patient: Emergency Contact    Best call back number:[phone]    Patient is needing: PATIENT'S WIFE IS CALLING TO STATE PATIENT STILL HAS THE STOMACH PAIN, BUT NOW HAS DIARRHEA, AND THE STOOL IS REAL DARK.  SHE IS WANTING TO KNOW WHAT DR SHANKAR RECOMMENDS.    [id number] [address] [place] [address] AT [facility] - [phone] [place] [phone]   [phone]    PLEASE ADVISE.

## 2023-01-06 ENCOUNTER — TELEPHONE (OUTPATIENT)
Dept: INTERNAL MEDICINE | Facility: CLINIC | Age: 83
End: 2023-01-06

## 2023-01-06 NOTE — TELEPHONE ENCOUNTER
Caller: MARIANA CORTES    Relationship: Emergency Contact    Best call back number:[phone]     What is the best time to reach you: ANYTIME    Who are you requesting to speak with (clinical staff, provider,  specific staff member): CLINICAL    Do you know the name of the person who called:MARIANA    What was the call regarding: MARIANA UROLOGIST @ [facility] UROLOGY 1 MONTH AGO, NOTHING WAS DONE AT THE VISIT WAS TOLD TO FOLLOW UP IN 2 MONTHS , HORTENSIA IS STILL ITCHING, PAINFUL URINATION. SHE WOULD LIKE TO KNOW WHAT DR SHANKAR SUGGEST    Do you require a callback: YES

## 2023-01-09 RX ORDER — CLOPIDOGREL BISULFATE 75 MG/1
75 TABLET ORAL DAILY
Qty: 30 TABLET | Refills: 11 | Status: SHIPPED | OUTPATIENT
Start: 2023-01-09

## 2023-03-20 ENCOUNTER — OFFICE VISIT (OUTPATIENT)
Dept: INTERNAL MEDICINE | Facility: CLINIC | Age: 83
End: 2023-03-20
Payer: MEDICARE

## 2023-03-20 ENCOUNTER — HOSPITAL ENCOUNTER (OUTPATIENT)
Dept: GENERAL RADIOLOGY | Facility: HOSPITAL | Age: 83
Discharge: HOME | End: 2023-03-20
Admitting: INTERNAL MEDICINE
Payer: MEDICARE

## 2023-03-20 VITALS
DIASTOLIC BLOOD PRESSURE: 82 MMHG | WEIGHT: 199.4 LBS | BODY MASS INDEX: 27.92 KG/M2 | SYSTOLIC BLOOD PRESSURE: 118 MMHG | HEIGHT: 71 IN

## 2023-03-20 DIAGNOSIS — M25.562 CHRONIC PAIN OF LEFT KNEE: ICD-10-CM

## 2023-03-20 DIAGNOSIS — Z99.89 OSA ON CPAP: ICD-10-CM

## 2023-03-20 DIAGNOSIS — G89.29 CHRONIC RIGHT HIP PAIN: ICD-10-CM

## 2023-03-20 DIAGNOSIS — N18.31 STAGE 3A CHRONIC KIDNEY DISEASE: Chronic | ICD-10-CM

## 2023-03-20 DIAGNOSIS — G47.33 OSA ON CPAP: ICD-10-CM

## 2023-03-20 DIAGNOSIS — G89.29 CHRONIC PAIN OF LEFT KNEE: ICD-10-CM

## 2023-03-20 DIAGNOSIS — M25.551 CHRONIC RIGHT HIP PAIN: ICD-10-CM

## 2023-03-20 DIAGNOSIS — Z00.00 ENCOUNTER FOR SUBSEQUENT ANNUAL WELLNESS VISIT (AWV) IN MEDICARE PATIENT: Primary | ICD-10-CM

## 2023-03-20 DIAGNOSIS — Z12.5 SCREENING FOR PROSTATE CANCER: ICD-10-CM

## 2023-03-20 DIAGNOSIS — I25.10 CORONARY ARTERY DISEASE INVOLVING NATIVE CORONARY ARTERY OF NATIVE HEART WITHOUT ANGINA PECTORIS: ICD-10-CM

## 2023-03-20 DIAGNOSIS — I10 ESSENTIAL HYPERTENSION: Chronic | ICD-10-CM

## 2023-03-20 DIAGNOSIS — E78.49 OTHER HYPERLIPIDEMIA: Chronic | ICD-10-CM

## 2023-03-20 PROBLEM — R91.8 MULTIPLE LUNG NODULES ON CT: Status: RESOLVED | Noted: 2019-11-21 | Resolved: 2023-03-20

## 2023-03-20 PROCEDURE — 73521 X-RAY EXAM HIPS BI 2 VIEWS: CPT

## 2023-03-20 NOTE — PROGRESS NOTES
The ABCs of the Annual Wellness Visit  Subsequent Medicare Wellness Visit    Subjective    Kenn Brito is a 82 y.o. male who presents for a Subsequent Medicare Wellness Visit.  He has seen Dr. Benson about his urination. He has a mass on the right kidney that is being monitored. He was given an abx for his dysuria and that has helped.It hurts on his right and left buttocks when he sits in a hard chair. He denies back pain. His left knee hurts. He has not injured it.  The following portions of the patient's history were reviewed and   updated as appropriate: allergies, current medications, past family history, past medical history, past social history, past surgical history and problem list.    Compared to one year ago, the patient feels his physical   health is the same.    Compared to one year ago, the patient feels his mental   health is the same.    Recent Hospitalizations:  He was not admitted to the hospital during the last year.       Current Medical Providers:  Patient Care Team:  Dante Tabor MD as PCP - General (Internal Medicine)    Outpatient Medications Prior to Visit   Medication Sig Dispense Refill   • aspirin 81 MG EC tablet Take 1 tablet by mouth Daily.     • atorvastatin (LIPITOR) 80 MG tablet TAKE 1 TABLET BY MOUTH EVERY DAY 90 tablet 3   • Cholecalciferol (VITAMIN D3) 2000 units tablet Take 1 tablet by mouth Daily.     • clopidogrel (PLAVIX) 75 MG tablet TAKE 1 TABLET BY MOUTH DAILY 30 tablet 11   • furosemide (LASIX) 40 MG tablet TAKE 1 TABLET BY MOUTH DAILY 90 tablet 3   • isosorbide mononitrate (IMDUR) 30 MG 24 hr tablet TAKE 1 TABLET BY MOUTH DAILY 90 tablet 3   • losartan (COZAAR) 50 MG tablet TAKE 1 TABLET BY MOUTH DAILY 90 tablet 3   • nitroglycerin (NITROSTAT) 0.4 MG SL tablet Place 1 tablet under the tongue As Needed for Chest Pain. 25 tablet 1   • Omega-3 1000 MG capsule Take 1,200 mg by mouth Daily.     • pantoprazole (PROTONIX) 40 MG EC tablet TAKE 1 TABLET BY MOUTH DAILY 30  "tablet 6   • ranolazine (RANEXA) 1000 MG 12 hr tablet TAKE 1 TABLET BY MOUTH EVERY 12 HOURS 180 tablet 3   • ketorolac (ACULAR) 0.5 % ophthalmic solution INSTILL 1 DROP IN RIGHT EYE FOUR TIMES DAILY. START DROP 3 DAYS BEFORE SURGERY AND. CONTINUE ACCORDING TO POST-OP INSTRUCTIONS (Patient not taking: Reported on 3/20/2023)       No facility-administered medications prior to visit.       No opioid medication identified on active medication list. I have reviewed chart for other potential  high risk medication/s and harmful drug interactions in the elderly.          Aspirin is on active medication list. Aspirin use is indicated based on review of current medical condition/s. Pros and cons of this therapy have been discussed today. Benefits of this medication outweigh potential harm.  Patient has been encouraged to continue taking this medication.  .      Patient Active Problem List   Diagnosis   • GERD (gastroesophageal reflux disease)   • Other hyperlipidemia   • Essential hypertension   • EVELINA on CPAP   • PVC (premature ventricular contraction)   • Coronary artery disease involving native coronary artery of native heart without angina pectoris   • Sinus pause   • Stage 3a chronic kidney disease (HCC)     Advance Care Planning  Advance Directive is not on file.  ACP discussion was held with the patient during this visit. Patient has an advance directive (not in EMR), copy requested.     Objective    Vitals:    03/20/23 0809   BP: 118/82   Weight: 90.4 kg (199 lb 6.4 oz)   Height: 180.3 cm (71\")   PainSc: 0-No pain     Estimated body mass index is 27.81 kg/m² as calculated from the following:    Height as of this encounter: 180.3 cm (71\").    Weight as of this encounter: 90.4 kg (199 lb 6.4 oz).    BMI is >= 25 and <30. (Overweight) The following options were offered after discussion;: exercise counseling/recommendations      Does the patient have evidence of cognitive impairment? No          HEALTH RISK " ASSESSMENT    Smoking Status:  Social History     Tobacco Use   Smoking Status Former   • Packs/day: 0.50   • Years: 20.00   • Pack years: 10.00   • Types: Cigarettes   • Quit date: 1988   • Years since quittin.2   Smokeless Tobacco Never     Alcohol Consumption:  Social History     Substance and Sexual Activity   Alcohol Use Yes   • Alcohol/week: 0.0 - 1.0 standard drinks    Comment: rare social     Fall Risk Screen:    STEADI Fall Risk Assessment was completed, and patient is at LOW risk for falls.Assessment completed on:3/20/2023    Depression Screening:  PHQ-2/PHQ-9 Depression Screening 3/20/2023   Little Interest or Pleasure in Doing Things 0-->not at all   Feeling Down, Depressed or Hopeless 0-->not at all   PHQ-9: Brief Depression Severity Measure Score 0       Health Habits and Functional and Cognitive Screening:  Functional & Cognitive Status 3/20/2023   Do you have difficulty preparing food and eating? No   Do you have difficulty bathing yourself, getting dressed or grooming yourself? No   Do you have difficulty using the toilet? No   Do you have difficulty moving around from place to place? No   Do you have trouble with steps or getting out of a bed or a chair? No   Current Diet Well Balanced Diet   Dental Exam Not up to date   Eye Exam Up to date   Exercise (times per week) 5 times per week   Current Exercises Include Walking   Current Exercise Activities Include -   Do you need help using the phone?  No   Are you deaf or do you have serious difficulty hearing?  Yes   Do you need help with transportation? No   Do you need help shopping? No   Do you need help preparing meals?  No   Do you need help with housework?  No   Do you need help with laundry? No   Do you need help taking your medications? No   Do you need help managing money? No   Do you ever drive or ride in a car without wearing a seat belt? No   Have you felt unusual stress, anger or loneliness in the last month? No   Who do you live  with? Spouse   If you need help, do you have trouble finding someone available to you? No   Have you been bothered in the last four weeks by sexual problems? No   Do you have difficulty concentrating, remembering or making decisions? No       Age-appropriate Screening Schedule:  Refer to the list below for future screening recommendations based on patient's age, sex and/or medical conditions. Orders for these recommended tests are listed in the plan section. The patient has been provided with a written plan.    Health Maintenance   Topic Date Due   • ZOSTER VACCINE (1 of 2) Never done   • COVID-19 Vaccine (4 - Booster for Moderna series) 12/21/2021   • LIPID PANEL  08/23/2023   • ANNUAL WELLNESS VISIT  03/20/2024   • TDAP/TD VACCINES (2 - Td or Tdap) 09/18/2028   • INFLUENZA VACCINE  Completed   • Pneumococcal Vaccine 65+  Completed                CMS Preventative Services Quick Reference  Risk Factors Identified During Encounter  Immunizations Discussed/Encouraged: Shingrix  The above risks/problems have been discussed with the patient.  Pertinent information has been shared with the patient in the After Visit Summary.  An After Visit Summary and PPPS were made available to the patient.    Follow Up:   Next Medicare Wellness visit to be scheduled in 1 year.       Additional E&M Note during same encounter follows:  Patient has multiple medical problems which are significant and separately identifiable that require additional work above and beyond the Medicare Wellness Visit.      Chief Complaint  Medicare Wellness-subsequent    Subjective        HPI  Kenn Brito is also being seen today for Medicare Wellness and HTN.  He has seen Dr. Benson about his urination. He has a mass on the right kidney that is being monitored. He was given an abx for his dysuria and that has helped.It hurts on his right and left buttocks when he sits in a hard chair. He denies back pain. His left knee hurts. He has not injured it.      "    Objective   Vital Signs:  /82   Ht 180.3 cm (71\")   Wt 90.4 kg (199 lb 6.4 oz)   BMI 27.81 kg/m²     Physical Exam  Vitals reviewed.   Constitutional:       Appearance: He is well-developed.   HENT:      Head: Normocephalic and atraumatic.   Neck:      Vascular: No carotid bruit.   Cardiovascular:      Rate and Rhythm: Normal rate and regular rhythm.      Heart sounds: Normal heart sounds, S1 normal and S2 normal.   Pulmonary:      Effort: Pulmonary effort is normal.      Breath sounds: Normal breath sounds.   Musculoskeletal:      Comments: Left knee FROM without crepitus    Right hip good ROM    No swelling of left foot.   Skin:     General: Skin is warm.   Neurological:      Mental Status: He is alert.   Psychiatric:         Behavior: Behavior normal.                         Assessment and Plan   Diagnoses and all orders for this visit:    1. Encounter for subsequent annual wellness visit (AWV) in Medicare patient (Primary)    2. EVELINA on CPAP  Comments:  Uses CPAP    3. Stage 3a chronic kidney disease (HCC)  Comments:  Aware to avoid NSAIDS  Orders:  -     Comprehensive Metabolic Panel; Future    4. Coronary artery disease involving native coronary artery of native heart without angina pectoris  Comments:  Modify risk factors.    5. Essential hypertension  Comments:  BP is good    6. Other hyperlipidemia  -     Lipid Panel With / Chol / HDL Ratio; Future    7. Chronic pain of left knee    8. Chronic right hip pain  -     XR Hips Bilateral With or Without Pelvis 2 View; Future    9. Screening for prostate cancer  -     PSA Screen; Future             Follow Up   Return in about 6 months (around 9/20/2023) for Lab Before FUP.  Patient was given instructions and counseling regarding his condition or for health maintenance advice. Please see specific information pulled into the AVS if appropriate.       Reviewed labs. Cr is stable. Xray hips. Declines xray left knee. Monitor edema left foot as I see no " abnormality today.

## 2023-04-06 DIAGNOSIS — E78.49 OTHER HYPERLIPIDEMIA: Chronic | ICD-10-CM

## 2023-04-06 RX ORDER — ATORVASTATIN CALCIUM 80 MG/1
TABLET, FILM COATED ORAL
Qty: 90 TABLET | Refills: 3 | Status: SHIPPED | OUTPATIENT
Start: 2023-04-06

## 2023-04-07 DIAGNOSIS — I51.9 HEART DISEASE: ICD-10-CM

## 2023-04-07 RX ORDER — RANOLAZINE 1000 MG/1
TABLET, EXTENDED RELEASE ORAL
Qty: 180 TABLET | Refills: 3 | Status: SHIPPED | OUTPATIENT
Start: 2023-04-07

## 2023-05-10 RX ORDER — ISOSORBIDE MONONITRATE 30 MG/1
TABLET, EXTENDED RELEASE ORAL
Qty: 90 TABLET | Refills: 3 | Status: SHIPPED | OUTPATIENT
Start: 2023-05-10

## 2023-05-29 DIAGNOSIS — I10 ESSENTIAL HYPERTENSION: ICD-10-CM

## 2023-05-30 RX ORDER — LOSARTAN POTASSIUM 50 MG/1
50 TABLET ORAL DAILY
Qty: 90 TABLET | Refills: 3 | Status: SHIPPED | OUTPATIENT
Start: 2023-05-30

## 2023-07-26 ENCOUNTER — OFFICE VISIT (OUTPATIENT)
Dept: CARDIOLOGY | Facility: CLINIC | Age: 83
End: 2023-07-26
Payer: MEDICARE

## 2023-07-26 VITALS
HEART RATE: 72 BPM | BODY MASS INDEX: 27.72 KG/M2 | HEIGHT: 71 IN | SYSTOLIC BLOOD PRESSURE: 120 MMHG | DIASTOLIC BLOOD PRESSURE: 78 MMHG | WEIGHT: 198 LBS

## 2023-07-26 DIAGNOSIS — I25.10 CORONARY ARTERY DISEASE INVOLVING NATIVE CORONARY ARTERY OF NATIVE HEART WITHOUT ANGINA PECTORIS: Primary | ICD-10-CM

## 2023-07-26 DIAGNOSIS — Z99.89 OSA ON CPAP: ICD-10-CM

## 2023-07-26 DIAGNOSIS — G47.33 OSA ON CPAP: ICD-10-CM

## 2023-07-26 DIAGNOSIS — I10 ESSENTIAL HYPERTENSION: ICD-10-CM

## 2023-07-26 DIAGNOSIS — Z95.1 HX OF CABG: ICD-10-CM

## 2023-07-26 DIAGNOSIS — E78.49 OTHER HYPERLIPIDEMIA: ICD-10-CM

## 2023-07-26 PROCEDURE — 1159F MED LIST DOCD IN RCRD: CPT | Performed by: NURSE PRACTITIONER

## 2023-07-26 PROCEDURE — 3078F DIAST BP <80 MM HG: CPT | Performed by: NURSE PRACTITIONER

## 2023-07-26 PROCEDURE — 1160F RVW MEDS BY RX/DR IN RCRD: CPT | Performed by: NURSE PRACTITIONER

## 2023-07-26 PROCEDURE — 99214 OFFICE O/P EST MOD 30 MIN: CPT | Performed by: NURSE PRACTITIONER

## 2023-07-26 PROCEDURE — 3074F SYST BP LT 130 MM HG: CPT | Performed by: NURSE PRACTITIONER

## 2023-07-26 PROCEDURE — 93000 ELECTROCARDIOGRAM COMPLETE: CPT | Performed by: NURSE PRACTITIONER

## 2023-07-26 RX ORDER — PANTOPRAZOLE SODIUM 40 MG/1
40 TABLET, DELAYED RELEASE ORAL DAILY
Qty: 90 TABLET | Refills: 3 | Status: SHIPPED | OUTPATIENT
Start: 2023-07-26

## 2023-07-26 RX ORDER — CLOPIDOGREL BISULFATE 75 MG/1
75 TABLET ORAL DAILY
Qty: 90 TABLET | Refills: 3 | Status: SHIPPED | OUTPATIENT
Start: 2023-07-26

## 2023-07-26 NOTE — PROGRESS NOTES
"    CARDIOLOGY        Patient Name: Kenn Brito  :1940  Age: 82 y.o.  Primary Cardiologist: Sam Wright MD  Encounter Provider:  FALLON Liu    Date of Service: 23        CHIEF COMPLAINT / REASON FOR OFFICE VISIT     Coronary Artery Disease (2022 Follow up)      HISTORY OF PRESENT ILLNESS       Coronary Artery Disease  Pertinent negatives include no leg swelling or weight gain.     Kenn Brito is a 82 y.o. male who presents today for annual follow up.     Pt has a  history significant for CAD, hyperlipidemia, PVC, hypertension, stage III renal disease.    Patient reports that he is done well for the past year.  Reports that he does try to walk whenever possible.  Admits that he does not check his blood pressure routinely but that it has always been controlled when he has it checked at doctors appointments.  Patient is currently asymptomatic and denies chest pain, dyspnea at rest or with exertion, palpitations, lightheadedness, edema, fatigue.    The following portions of the patient's history were reviewed and updated as appropriate: allergies, current medications, past family history, past medical history, past social history, past surgical history and problem list.      VITAL SIGNS     Visit Vitals  Ht 180.3 cm (71\")   BMI 27.81 kg/m²         Wt Readings from Last 3 Encounters:   23 90.4 kg (199 lb 6.4 oz)   22 90.7 kg (200 lb)   22 90.7 kg (200 lb)     Body mass index is 27.81 kg/m².      REVIEW OF SYSTEMS   Review of Systems   Constitutional: Negative for chills, fever, weight gain and weight loss.   Cardiovascular:  Negative for leg swelling.   Respiratory:  Negative for cough, snoring and wheezing.    Hematologic/Lymphatic: Negative for bleeding problem. Does not bruise/bleed easily.   Skin:  Negative for color change.   Musculoskeletal:  Negative for falls, joint pain and myalgias.   Gastrointestinal:  Negative for melena.   Genitourinary:  " Negative for hematuria.   Neurological:  Negative for excessive daytime sleepiness.   Psychiatric/Behavioral:  Negative for depression. The patient is not nervous/anxious.          PHYSICAL EXAMINATION     Constitutional:       Appearance: Normal appearance. Well-developed.   Eyes:      Conjunctiva/sclera: Conjunctivae normal.   Neck:      Vascular: No carotid bruit.   Pulmonary:      Effort: Pulmonary effort is normal.      Breath sounds: Normal breath sounds.   Cardiovascular:      Normal rate. Regular rhythm. Normal S1. Normal S2.       Murmurs: There is no murmur.      No gallop.  No click. No rub.   Edema:     Peripheral edema absent.   Musculoskeletal: Normal range of motion. Skin:     General: Skin is warm and dry.   Neurological:      Mental Status: Alert and oriented to person, place, and time.      GCS: GCS eye subscore is 4. GCS verbal subscore is 5. GCS motor subscore is 6.   Psychiatric:         Speech: Speech normal.         Behavior: Behavior normal.         Thought Content: Thought content normal.         Judgment: Judgment normal.         REVIEWED DATA       ECG 12 Lead    Date/Time: 7/26/2023 9:37 AM  Performed by: Yenny Tolliver APRN  Authorized by: Yenny Tolliver APRN   Comparison: compared with previous ECG from 7/26/2022  Rhythm: sinus rhythm  Rate: normal  BPM: 72  Conduction: right bundle branch block and 1st degree AV block  ST Segments: ST segments normal  T Waves: T waves normal  QRS axis: normal    Clinical impression: normal ECG        Cardiac Procedures:  Myocardial perfusion PET stress/12/2021.  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  Additional medium sized, moderate to severe area of ischemia in the apex.  Impressions consistent with high risk study.  Cardiac catheterization 1/28/2021.  Left main 70% origin lesion with dampening of the catheter.  LAD 10-20% proximal disease, normal at midportion and distally there is first diagonal that is  occluded.  Ramus is a large vessel with 30-40% disease.  Circumflex a small vessel with 70-80% lesion in the midportion not amenable to PCI.  RCA is dominant vessel 100% occluded.  LIMA widely patent not used his bypass.  SVG to diagonal was occluded.  SVG to OM1 is occluded.  SVG to RCA is widely patent but the distal portion there is a 70-80% degenerated lesion, native RCA has some luminal irregularities.  Patient treated with PCI and RANCHO.  ZIO monitor 2/24/2021.  Predominant rhythm was sinus.  PACs occurred occasionally.  PVCs occurred frequently with ventricular couplets bigeminy and trigeminy.  There was a 4 beat episode of nonsustained V. tach.  4 pauses with the longest pause lasting 5.3 seconds.  Cardiac catheterization 5/11/2021.  Left main.  It previously placed stent with a 10-20% in-stent waist.  LAD with 20% mid vessel stenosis.  Very small caliber D2 branch with discrete 90% ostial stenosis.  Left circumflex with  mid segment.  Fills via left to left collaterals.  OM1 small caliber with discrete 80% stenosis.  RCA  proximal segment with discrete 95% distal stenosis that is ulcerated with KIERA II flow.  SVG to RCA is normal.  SVG to diagonal and OM are known to be occluded.  LIMA not used.  Successful PCI of the distal RCA through the SVG to RCA graft with RANCHO.      Lipid Panel          8/23/2022    08:00   Lipid Panel   Total Cholesterol 109    Triglycerides 106    HDL Cholesterol 34    VLDL Cholesterol 20    LDL Cholesterol  55      BUN   Date Value Ref Range Status   03/14/2023 29 (H) 8 - 23 mg/dL Final   05/12/2021 23 8 - 23 mg/dL Final   12/28/2019 37 (H) 7 - 20 mg/dL Final     Creatinine   Date Value Ref Range Status   03/14/2023 1.50 (H) 0.76 - 1.27 mg/dL Final   05/12/2021 1.24 0.76 - 1.27 mg/dL Final   12/28/2019 1.5 0.7 - 1.5 mg/dL Final     Potassium   Date Value Ref Range Status   03/14/2023 4.2 3.5 - 5.2 mmol/L Final   05/12/2021 4.4 3.5 - 5.2 mmol/L Final     Comment:     Specimen  hemolyzed.  Results may be affected.   12/28/2019 4.4 3.5 - 5.1 mmol/L Final     ALT (SGPT)   Date Value Ref Range Status   11/29/2022 11 1 - 41 U/L Final   05/11/2021 15 1 - 41 U/L Final   10/18/2019 24 13 - 69 U/L Final     AST (SGOT)   Date Value Ref Range Status   11/29/2022 20 1 - 40 U/L Final     Comment:     Slight hemolysis detected by analyzer. Results may be  affected.     05/11/2021 22 1 - 40 U/L Final   10/18/2019 21 15 - 46 U/L Final           ASSESSMENT & PLAN     Diagnoses and all orders for this visit:    1. Coronary artery disease involving native coronary artery of native heart without angina pectoris (Primary)  -     ECG 12 Lead  2. Hx of CABG  Currently denies angina or dyspnea  Exercises in the form of walking when he is able to and when it is not too hot outside  Continue GDMT with aspirin, clopidogrel, atorvastatin, Ranexa, Imdur, losartan    3. Essential hypertension  BP controlled at 120/78  Continue losartan 50 mg/day, furosemide 40 mg/day    4. Other hyperlipidemia  Goal LDL less than 70, lipid panel documented and reviewed above.  LDL currently at goal  Continue atorvastatin    5. EVELINA on CPAP    Other orders  -     clopidogrel (PLAVIX) 75 MG tablet; Take 1 tablet by mouth Daily.  Dispense: 90 tablet; Refill: 3  -     pantoprazole (PROTONIX) 40 MG EC tablet; Take 1 tablet by mouth Daily.  Dispense: 90 tablet; Refill: 3        Return in about 1 year (around 7/26/2024) for Dr. Wright- Routine.    Future Appointments         Provider Department Center    9/22/2023 8:30 AM [facility]    9/28/2023 9:30 AM Dante Tabor MD [facility] PRIMARY CARE [facility]    7/29/2024 8:20 AM Sam Wright MD [facility] CARDIOLOGY [facility]                    MEDICATIONS         Discharge Medications            Accurate as of July 26, 2023  9:34 AM. If you have any questions, ask your nurse or doctor.                 Continue These Medications        Instructions Start Date   aspirin 81 MG EC tablet   81 mg, Oral, Daily      atorvastatin 80 MG tablet  Commonly known as: LIPITOR   TAKE 1 TABLET BY MOUTH EVERY DAY      clopidogrel 75 MG tablet  Commonly known as: PLAVIX   75 mg, Oral, Daily      furosemide 40 MG tablet  Commonly known as: LASIX   40 mg, Oral, Daily      isosorbide mononitrate 30 MG 24 hr tablet  Commonly known as: IMDUR   TAKE 1 TABLET BY MOUTH DAILY      losartan 50 MG tablet  Commonly known as: COZAAR   50 mg, Oral, Daily      nitroglycerin 0.4 MG SL tablet  Commonly known as: NITROSTAT   0.4 mg, Sublingual, As Needed      Omega-3 1000 MG capsule   1,200 mg, Oral, Daily      pantoprazole 40 MG EC tablet  Commonly known as: PROTONIX   40 mg, Oral, Daily      ranolazine 1000 MG 12 hr tablet  Commonly known as: RANEXA   TAKE 1 TABLET BY MOUTH EVERY 12 HOURS      Vitamin D3 50 MCG (2000 UT) tablet   2,000 Units, Oral, Daily                   **Dragon Disclaimer:   Much of this encounter note is an electronic transcription/translation of spoken language to printed text. The electronic translation of spoken language may permit erroneous, or at times, nonsensical words or phrases to be inadvertently transcribed. Although I have reviewed the note for such errors, some may still exist.

## 2023-09-26 RX ORDER — FUROSEMIDE 40 MG/1
40 TABLET ORAL DAILY
Qty: 90 TABLET | Refills: 3 | Status: SHIPPED | OUTPATIENT
Start: 2023-09-26

## 2023-10-10 ENCOUNTER — LAB (OUTPATIENT)
Facility: HOSPITAL | Age: 83
End: 2023-10-10
Payer: MEDICARE

## 2023-10-10 PROCEDURE — 80061 LIPID PANEL: CPT | Performed by: INTERNAL MEDICINE

## 2023-10-10 PROCEDURE — 80053 COMPREHEN METABOLIC PANEL: CPT | Performed by: INTERNAL MEDICINE

## 2023-10-10 PROCEDURE — G0103 PSA SCREENING: HCPCS | Performed by: INTERNAL MEDICINE

## 2023-10-19 ENCOUNTER — OFFICE VISIT (OUTPATIENT)
Dept: INTERNAL MEDICINE | Facility: CLINIC | Age: 83
End: 2023-10-19
Payer: MEDICARE

## 2023-10-19 VITALS
DIASTOLIC BLOOD PRESSURE: 78 MMHG | TEMPERATURE: 98 F | BODY MASS INDEX: 27.86 KG/M2 | HEIGHT: 71 IN | WEIGHT: 199 LBS | SYSTOLIC BLOOD PRESSURE: 122 MMHG

## 2023-10-19 DIAGNOSIS — Z23 NEED FOR INFLUENZA VACCINATION: ICD-10-CM

## 2023-10-19 DIAGNOSIS — E78.49 OTHER HYPERLIPIDEMIA: Primary | Chronic | ICD-10-CM

## 2023-10-19 DIAGNOSIS — I10 ESSENTIAL HYPERTENSION: Chronic | ICD-10-CM

## 2023-10-19 DIAGNOSIS — N18.31 STAGE 3A CHRONIC KIDNEY DISEASE: Chronic | ICD-10-CM

## 2023-10-19 RX ORDER — PANTOPRAZOLE SODIUM 40 MG/1
40 TABLET, DELAYED RELEASE ORAL DAILY
Qty: 90 TABLET | Refills: 3 | Status: SHIPPED | OUTPATIENT
Start: 2023-10-19

## 2023-10-19 NOTE — PROGRESS NOTES
Subjective        Chief Complaint   Patient presents with    Hyperlipidemia           Kenn Brito is a 82 y.o. male who presents for    Patient Active Problem List   Diagnosis    GERD (gastroesophageal reflux disease)    Other hyperlipidemia    Essential hypertension    EVELINA on CPAP    PVC (premature ventricular contraction)    Coronary artery disease involving native coronary artery of native heart without angina pectoris    Sinus pause    Stage 3a chronic kidney disease       History of Present Illness       He denies chest pain. He has dyspnea with activity that is not new. He does not wheeze or cough. He uses a CPAP. He saw urology yesterday and he was told that his Cts are unchanged. He has repeat CT in Feb.  No Known Allergies    Current Outpatient Medications on File Prior to Visit   Medication Sig Dispense Refill    aspirin 81 MG EC tablet Take 1 tablet by mouth Daily.      atorvastatin (LIPITOR) 80 MG tablet TAKE 1 TABLET BY MOUTH EVERY DAY 90 tablet 3    Cholecalciferol (VITAMIN D3) 2000 units tablet Take 1 tablet by mouth Daily.      clopidogrel (PLAVIX) 75 MG tablet Take 1 tablet by mouth Daily. 90 tablet 3    furosemide (LASIX) 40 MG tablet TAKE 1 TABLET BY MOUTH DAILY 90 tablet 3    isosorbide mononitrate (IMDUR) 30 MG 24 hr tablet TAKE 1 TABLET BY MOUTH DAILY 90 tablet 3    losartan (COZAAR) 50 MG tablet TAKE 1 TABLET BY MOUTH DAILY 90 tablet 3    nitroglycerin (NITROSTAT) 0.4 MG SL tablet Place 1 tablet under the tongue As Needed for Chest Pain. 25 tablet 1    Omega-3 1000 MG capsule Take 1,200 mg by mouth Daily.      ranolazine (RANEXA) 1000 MG 12 hr tablet TAKE 1 TABLET BY MOUTH EVERY 12 HOURS 180 tablet 3    [DISCONTINUED] pantoprazole (PROTONIX) 40 MG EC tablet Take 1 tablet by mouth Daily. 90 tablet 3     No current facility-administered medications on file prior to visit.       Past Medical History:   Diagnosis Date    GERD (gastroesophageal reflux disease)     Herpes zoster      Hypercalcemia     Increased PTH level 5/20/2019    Multiple lung nodules on CT 11/21/2019    Renal cyst     Sciatica     Tubular adenoma of colon 2014    X3       Past Surgical History:   Procedure Laterality Date    CARDIAC CATHETERIZATION  03/2016    CARDIAC CATHETERIZATION Left 03/26/2013    x1 stent [facility], Dr. Andrei Lutz    CARDIAC CATHETERIZATION N/A 1/28/2021    Procedure: Coronary angiography;  Surgeon: Jaguar Umana MD;  Location:  [facility] CATH INVASIVE [facility];  Service: Cardiovascular;  Laterality: N/A;    CARDIAC CATHETERIZATION N/A 1/28/2021    Procedure: Left heart cath;  Surgeon: Jaguar Umana MD;  Location:  [facility];  Service: Cardiovascular;  Laterality: N/A;    CARDIAC CATHETERIZATION N/A 1/28/2021    Procedure: Left ventriculography;  Surgeon: Jaguar Umana MD;  Location:  [facility];  Service: Cardiovascular;  Laterality: N/A;    CARDIAC CATHETERIZATION N/A 1/28/2021    Procedure: Saphenous Vein Graft;  Surgeon: Jaguar Umana MD;  [facility];  Service: Cardiovascular;  Laterality: N/A;    CARDIAC CATHETERIZATION N/A 1/28/2021    Procedure: Resting Full Cycle Ratio;  Surgeon: Jaguar Umana MD;  [facility];  Service: Cardiovascular;  Laterality: N/A;    CARDIAC CATHETERIZATION N/A 1/28/2021    Procedure: Stent RANCHO coronary;  Surgeon: Jaguar Umana MD;  [facility];  Service: Cardiovascular;  Laterality: N/A;    CARDIAC CATHETERIZATION N/A 1/28/2021    Procedure: Stent RANCHO bypass graft-RCA;  Surgeon: Jaguar Umana MD;  [facility];  Service: Cardiovascular;  Laterality: N/A;    CARDIAC CATHETERIZATION N/A 5/11/2021    Procedure: CORONARY ANGIOGRAPHY;  Surgeon: Freedom Mccollum MD;  Location:  [facility];  Service: Cardiovascular;  Laterality: N/A;    CARDIAC CATHETERIZATION N/A  2021    Procedure: Stent RANCHO bypass graft;  Surgeon: Freedom Mccollum MD;  Location:  [facility];  Service: Cardiovascular;  Laterality: N/A;    CARDIAC CATHETERIZATION  2021    Procedure: Saphenous Vein Graft;  Surgeon: Freedom Mccollum MD;  Location:  [facility];  Service: Cardiovascular;;    COLONOSCOPY  10/29/2014    CORONARY ARTERY BYPASS GRAFT  2000    x3 vessel    INTERVENTIONAL RADIOLOGY PROCEDURE N/A 2021    Procedure: Intravascular Ultrasound;  Surgeon: Jaguar Umana MD;  Location:  [facility];  Service: Cardiovascular;  Laterality: N/A;    REPLACEMENT TOTAL KNEE Right 2019       Family History   Problem Relation Age of Onset    Throat cancer Father     Peripheral vascular disease Brother     Diabetes Brother     Heart disease Brother     Crohn's disease Daughter        Social History     Socioeconomic History    Marital status:    Tobacco Use    Smoking status: Former     Packs/day: 0.50     Years: 20.00     Additional pack years: 0.00     Total pack years: 10.00     Types: Cigarettes     Quit date: 1988     Years since quittin.8    Smokeless tobacco: Never   Vaping Use    Vaping Use: Never used   Substance and Sexual Activity    Alcohol use: Yes     Alcohol/week: 0.0 - 1.0 standard drinks of alcohol     Comment: rare social    Drug use: No    Sexual activity: Defer           The following portions of the patient's history were reviewed and updated as appropriate: problem list, allergies, current medications, past medical history, past family history, past social history, and past surgical history.    Review of Systems    Immunization History   Administered Date(s) Administered    COVID-19 (MODERNA) 1st,2nd,3rd Dose Monovalent 2021, 2021, 10/26/2021    Fluad Quad 65+ 09/10/2020, 10/14/2021    Fluzone High Dose =>65 Years ([employer] ONLY) 2018    Fluzone High-Dose 65+yrs 10/10/2022     "Pneumococcal Conjugate 13-Valent (PCV13) 01/31/2018    Pneumococcal Polysaccharide (PPSV23) 02/18/2019    Tdap 09/18/2018       Objective   Vitals:    10/19/23 0806   BP: 122/78   Weight: 90.3 kg (199 lb)   Height: 180.3 cm (71\")     Body mass index is 27.75 kg/m².  Physical Exam  Vitals reviewed.   Constitutional:       Appearance: He is well-developed.   HENT:      Head: Normocephalic and atraumatic.   Cardiovascular:      Rate and Rhythm: Normal rate and regular rhythm.      Heart sounds: Normal heart sounds, S1 normal and S2 normal.   Pulmonary:      Effort: Pulmonary effort is normal.      Breath sounds: Normal breath sounds.   Skin:     General: Skin is warm.   Neurological:      Mental Status: He is alert.   Psychiatric:         Behavior: Behavior normal.         Procedures    Assessment & Plan   Diagnoses and all orders for this visit:    1. Other hyperlipidemia (Primary)  Comments:  LDL is excellent    2. Essential hypertension    3. Stage 3a chronic kidney disease  Comments:  stable  Orders:  -     Basic Metabolic Panel; Future    Other orders  -     pantoprazole (PROTONIX) 40 MG EC tablet; Take 1 tablet by mouth Daily.  Dispense: 90 tablet; Refill: 3                 Reviewed cmp, flp, and psa. BP is excellent. Flu shot. [facility] skin check with derm given sun damage on head.  Return in about 6 months (around 4/19/2024) for Medicare Wellness, Lab Before FUP.  "

## 2024-01-08 RX ORDER — CLOPIDOGREL BISULFATE 75 MG/1
75 TABLET ORAL DAILY
Qty: 30 TABLET | Refills: 4 | Status: SHIPPED | OUTPATIENT
Start: 2024-01-08

## 2024-02-13 ENCOUNTER — HOSPITAL ENCOUNTER (EMERGENCY)
Facility: HOSPITAL | Age: 84
Discharge: HOME | End: 2024-02-13
Attending: EMERGENCY MEDICINE | Admitting: EMERGENCY MEDICINE
Payer: MEDICARE

## 2024-02-13 ENCOUNTER — APPOINTMENT (OUTPATIENT)
Dept: GENERAL RADIOLOGY | Facility: HOSPITAL | Age: 84
End: 2024-02-13
Payer: MEDICARE

## 2024-02-13 VITALS
HEART RATE: 74 BPM | RESPIRATION RATE: 16 BRPM | BODY MASS INDEX: 27.86 KG/M2 | HEIGHT: 71 IN | WEIGHT: 199 LBS | DIASTOLIC BLOOD PRESSURE: 86 MMHG | OXYGEN SATURATION: 97 % | SYSTOLIC BLOOD PRESSURE: 107 MMHG | TEMPERATURE: 97.9 F

## 2024-02-13 DIAGNOSIS — R06.00 ACUTE DYSPNEA: Primary | ICD-10-CM

## 2024-02-13 DIAGNOSIS — I48.91 ATRIAL FIBRILLATION, UNSPECIFIED TYPE: ICD-10-CM

## 2024-02-13 LAB
ALBUMIN SERPL-MCNC: 4.4 G/DL (ref 3.5–5.2)
ALBUMIN/GLOB SERPL: 2.3 G/DL
ALP SERPL-CCNC: 55 U/L (ref 39–117)
ALT SERPL W P-5'-P-CCNC: 14 U/L (ref 1–41)
ANION GAP SERPL CALCULATED.3IONS-SCNC: 10 MMOL/L (ref 5–15)
AST SERPL-CCNC: 16 U/L (ref 1–40)
BASOPHILS # BLD AUTO: 0.06 10*3/MM3 (ref 0–0.2)
BASOPHILS NFR BLD AUTO: 0.9 % (ref 0–1.5)
BILIRUB SERPL-MCNC: 1.2 MG/DL (ref 0–1.2)
BUN SERPL-MCNC: 27 MG/DL (ref 8–23)
BUN/CREAT SERPL: 17.1 (ref 7–25)
CALCIUM SPEC-SCNC: 10.2 MG/DL (ref 8.6–10.5)
CHLORIDE SERPL-SCNC: 101 MMOL/L (ref 98–107)
CO2 SERPL-SCNC: 25 MMOL/L (ref 22–29)
CREAT SERPL-MCNC: 1.58 MG/DL (ref 0.76–1.27)
D DIMER PPP FEU-MCNC: <0.27 MCGFEU/ML (ref 0–0.82)
DEPRECATED RDW RBC AUTO: 43.2 FL (ref 37–54)
EGFRCR SERPLBLD CKD-EPI 2021: 43.4 ML/MIN/1.73
EOSINOPHIL # BLD AUTO: 0.08 10*3/MM3 (ref 0–0.4)
EOSINOPHIL NFR BLD AUTO: 1.1 % (ref 0.3–6.2)
ERYTHROCYTE [DISTWIDTH] IN BLOOD BY AUTOMATED COUNT: 12.3 % (ref 12.3–15.4)
GLOBULIN UR ELPH-MCNC: 1.9 GM/DL
GLUCOSE SERPL-MCNC: 120 MG/DL (ref 65–99)
HCT VFR BLD AUTO: 42.3 % (ref 37.5–51)
HGB BLD-MCNC: 14 G/DL (ref 13–17.7)
HOLD SPECIMEN: NORMAL
IMM GRANULOCYTES # BLD AUTO: 0.02 10*3/MM3 (ref 0–0.05)
IMM GRANULOCYTES NFR BLD AUTO: 0.3 % (ref 0–0.5)
LYMPHOCYTES # BLD AUTO: 1.29 10*3/MM3 (ref 0.7–3.1)
LYMPHOCYTES NFR BLD AUTO: 18.4 % (ref 19.6–45.3)
MCH RBC QN AUTO: 31.8 PG (ref 26.6–33)
MCHC RBC AUTO-ENTMCNC: 33.1 G/DL (ref 31.5–35.7)
MCV RBC AUTO: 96.1 FL (ref 79–97)
MONOCYTES # BLD AUTO: 0.73 10*3/MM3 (ref 0.1–0.9)
MONOCYTES NFR BLD AUTO: 10.4 % (ref 5–12)
NEUTROPHILS NFR BLD AUTO: 4.83 10*3/MM3 (ref 1.7–7)
NEUTROPHILS NFR BLD AUTO: 68.9 % (ref 42.7–76)
NRBC BLD AUTO-RTO: 0 /100 WBC (ref 0–0.2)
NT-PROBNP SERPL-MCNC: 659 PG/ML (ref 0–1800)
PLATELET # BLD AUTO: 179 10*3/MM3 (ref 140–450)
PMV BLD AUTO: 10.8 FL (ref 6–12)
POTASSIUM SERPL-SCNC: 4 MMOL/L (ref 3.5–5.2)
PROT SERPL-MCNC: 6.3 G/DL (ref 6–8.5)
QT INTERVAL: 382 MS
QTC INTERVAL: 457 MS
RBC # BLD AUTO: 4.4 10*6/MM3 (ref 4.14–5.8)
SODIUM SERPL-SCNC: 136 MMOL/L (ref 136–145)
TROPONIN T SERPL HS-MCNC: 21 NG/L
TROPONIN T SERPL HS-MCNC: 21 NG/L
WBC NRBC COR # BLD AUTO: 7.01 10*3/MM3 (ref 3.4–10.8)
WHOLE BLOOD HOLD COAG: NORMAL
WHOLE BLOOD HOLD SPECIMEN: NORMAL

## 2024-02-13 PROCEDURE — 84484 ASSAY OF TROPONIN QUANT: CPT | Performed by: EMERGENCY MEDICINE

## 2024-02-13 PROCEDURE — 85379 FIBRIN DEGRADATION QUANT: CPT | Performed by: EMERGENCY MEDICINE

## 2024-02-13 PROCEDURE — 83880 ASSAY OF NATRIURETIC PEPTIDE: CPT | Performed by: EMERGENCY MEDICINE

## 2024-02-13 PROCEDURE — 93005 ELECTROCARDIOGRAM TRACING: CPT | Performed by: EMERGENCY MEDICINE

## 2024-02-13 PROCEDURE — 71045 X-RAY EXAM CHEST 1 VIEW: CPT

## 2024-02-13 PROCEDURE — 80053 COMPREHEN METABOLIC PANEL: CPT | Performed by: EMERGENCY MEDICINE

## 2024-02-13 PROCEDURE — 99284 EMERGENCY DEPT VISIT MOD MDM: CPT

## 2024-02-13 PROCEDURE — 36415 COLL VENOUS BLD VENIPUNCTURE: CPT

## 2024-02-13 PROCEDURE — 85025 COMPLETE CBC W/AUTO DIFF WBC: CPT | Performed by: EMERGENCY MEDICINE

## 2024-02-13 PROCEDURE — 93005 ELECTROCARDIOGRAM TRACING: CPT

## 2024-02-13 RX ORDER — SODIUM CHLORIDE 0.9 % (FLUSH) 0.9 %
10 SYRINGE (ML) INJECTION AS NEEDED
Status: DISCONTINUED | OUTPATIENT
Start: 2024-02-13 | End: 2024-02-13 | Stop reason: HOSPADM

## 2024-02-13 RX ORDER — METOPROLOL SUCCINATE 25 MG/1
12.5 TABLET, EXTENDED RELEASE ORAL DAILY
Qty: 15 TABLET | Refills: 0 | Status: SHIPPED | OUTPATIENT
Start: 2024-02-13 | End: 2024-03-14

## 2024-02-20 ENCOUNTER — OFFICE VISIT (OUTPATIENT)
Age: 84
End: 2024-02-20
Payer: MEDICARE

## 2024-02-20 VITALS
BODY MASS INDEX: 27.44 KG/M2 | SYSTOLIC BLOOD PRESSURE: 144 MMHG | HEART RATE: 103 BPM | WEIGHT: 196 LBS | DIASTOLIC BLOOD PRESSURE: 84 MMHG | HEIGHT: 71 IN

## 2024-02-20 DIAGNOSIS — Z95.1 HX OF CABG: ICD-10-CM

## 2024-02-20 DIAGNOSIS — E78.49 OTHER HYPERLIPIDEMIA: ICD-10-CM

## 2024-02-20 DIAGNOSIS — I48.19 PERSISTENT ATRIAL FIBRILLATION: Primary | ICD-10-CM

## 2024-02-20 DIAGNOSIS — I10 ESSENTIAL HYPERTENSION: ICD-10-CM

## 2024-02-20 DIAGNOSIS — G47.33 OSA ON CPAP: ICD-10-CM

## 2024-02-20 DIAGNOSIS — I25.10 CORONARY ARTERY DISEASE INVOLVING NATIVE CORONARY ARTERY OF NATIVE HEART WITHOUT ANGINA PECTORIS: ICD-10-CM

## 2024-02-20 PROCEDURE — 93000 ELECTROCARDIOGRAM COMPLETE: CPT | Performed by: NURSE PRACTITIONER

## 2024-02-20 PROCEDURE — 99214 OFFICE O/P EST MOD 30 MIN: CPT | Performed by: NURSE PRACTITIONER

## 2024-02-20 PROCEDURE — 3079F DIAST BP 80-89 MM HG: CPT | Performed by: NURSE PRACTITIONER

## 2024-02-20 PROCEDURE — 3077F SYST BP >= 140 MM HG: CPT | Performed by: NURSE PRACTITIONER

## 2024-02-20 RX ORDER — METOPROLOL SUCCINATE 25 MG/1
25 TABLET, EXTENDED RELEASE ORAL EVERY EVENING
Qty: 90 TABLET | Refills: 3 | Status: SHIPPED | OUTPATIENT
Start: 2024-02-20 | End: 2025-02-14

## 2024-02-20 NOTE — ASSESSMENT & PLAN NOTE
Patient with fairly new onset atrial fibrillation.  This was diagnosed in the ED setting  Patient is compliant with CPAP device.  He does not have known thyroid disease.  Denies daily alcohol use  ECG in clinic today reveals atrial fibrillation with heart rate in the low 100s  Recommended increasing metoprolol succinate to 25 mg/day  Patient anticoagulated with apixaban 2.5 mg for age and renal function, he denies bleeding complications  Patient educated on atrial fibrillation and its implications.    CHADS-VASc Risk Assessment              3 Total Score    1 Hypertension    2 Age >/= 75        Criteria that do not apply:    CHF    DM    PRIOR STROKE/TIA/THROMBO    Vascular Disease    Age 65-74    Sex: Female

## 2024-02-20 NOTE — PROGRESS NOTES
"    CARDIOLOGY        Patient Name: Kenn Brito  :1940  Age: 83 y.o.  Primary Cardiologist: Sam Wright MD  Encounter Provider:  FALLON Liu    Date of Service: 24        CHIEF COMPLAINT / REASON FOR OFFICE VISIT     Atrial Fibrillation      HISTORY OF PRESENT ILLNESS       Coronary Artery Disease  Pertinent negatives include no leg swelling or weight gain.   Atrial Fibrillation  Past medical history includes atrial fibrillation and CAD.       Kenn Brito is a 83 y.o. male who presents today for ED follow up.    Pt has a  history significant for CAD, hyperlipidemia, PVC, hypertension, stage III renal disease.    Medical record review reveals that patient was evaluated in the ED 2024.  Patient was complaining of dyspnea.  Patient was found to be in new onset atrial fibrillation.  Heart rate was controlled.  Patient was placed on metoprolol succinate as well as low-dose apixaban for weights and creatinine level.    Patient reports that he has been compliant with medications.  Denies any bleeding complications with apixaban.  He currently denies chest pain, dyspnea at rest or with exertion, palpitations, lightheadedness, edema, fatigue.  He reports compliance with CPAP device for sleep apnea.  He denies daily alcohol use.    The following portions of the patient's history were reviewed and updated as appropriate: allergies, current medications, past family history, past medical history, past social history, past surgical history and problem list.      VITAL SIGNS     Visit Vitals  /84   Pulse 103   Ht 180.3 cm (71\")   Wt 88.9 kg (196 lb)   BMI 27.34 kg/m²         Wt Readings from Last 3 Encounters:   24 88.9 kg (196 lb)   24 90.3 kg (199 lb)   10/19/23 90.3 kg (199 lb)     Body mass index is 27.34 kg/m².      REVIEW OF SYSTEMS   Review of Systems   Constitutional: Negative for chills, fever, weight gain and weight loss.   Cardiovascular:  Negative for leg " swelling.   Respiratory:  Negative for cough, snoring and wheezing.    Hematologic/Lymphatic: Negative for bleeding problem. Does not bruise/bleed easily.   Skin:  Negative for color change.   Musculoskeletal:  Negative for falls, joint pain and myalgias.   Gastrointestinal:  Negative for melena.   Genitourinary:  Negative for hematuria.   Neurological:  Negative for excessive daytime sleepiness.   Psychiatric/Behavioral:  Negative for depression. The patient is not nervous/anxious.            PHYSICAL EXAMINATION     Constitutional:       Appearance: Normal appearance. Well-developed.   Eyes:      Conjunctiva/sclera: Conjunctivae normal.   Neck:      Vascular: No carotid bruit.   Pulmonary:      Effort: Pulmonary effort is normal.      Breath sounds: Normal breath sounds.   Cardiovascular:      Tachycardia present. Irregularly irregular rhythm. Normal S1. Normal S2.       Murmurs: There is no murmur.      No gallop.  No click. No rub.   Edema:     Peripheral edema absent.   Musculoskeletal: Normal range of motion. Skin:     General: Skin is warm and dry.   Neurological:      Mental Status: Alert and oriented to person, place, and time.      GCS: GCS eye subscore is 4. GCS verbal subscore is 5. GCS motor subscore is 6.   Psychiatric:         Speech: Speech normal.         Behavior: Behavior normal.         Thought Content: Thought content normal.         Judgment: Judgment normal.           REVIEWED DATA       ECG 12 Lead    Date/Time: 2/20/2024 2:01 PM  Performed by: Yenny Tolliver APRN    Authorized by: Yenny Tolliver APRN  Comparison: compared with previous ECG from 2/13/2024  Rhythm: atrial fibrillation  Rate: tachycardic  BPM: 103  Conduction: right bundle branch block  ST Segments: ST segments normal  T Waves: T waves normal    Clinical impression: abnormal EKG          Cardiac Procedures:  Myocardial perfusion PET stress/12/2021.  Medium sized infarct in the lateral wall with severe sammy-infarct  ischemia.  Additional medium sized, moderate to severe area of ischemia in the apex.  Impressions consistent with high risk study.  Cardiac catheterization 1/28/2021.  Left main 70% origin lesion with dampening of the catheter.  LAD 10-20% proximal disease, normal at midportion and distally there is first diagonal that is occluded.  Ramus is a large vessel with 30-40% disease.  Circumflex a small vessel with 70-80% lesion in the midportion not amenable to PCI.  RCA is dominant vessel 100% occluded.  LIMA widely patent not used his bypass.  SVG to diagonal was occluded.  SVG to OM1 is occluded.  SVG to RCA is widely patent but the distal portion there is a 70-80% degenerated lesion, native RCA has some luminal irregularities.  Patient treated with PCI and RANCHO.  ZIO monitor 2/24/2021.  Predominant rhythm was sinus.  PACs occurred occasionally.  PVCs occurred frequently with ventricular couplets bigeminy and trigeminy.  There was a 4 beat episode of nonsustained V. tach.  4 pauses with the longest pause lasting 5.3 seconds.  Cardiac catheterization 5/11/2021.  Left main.  It previously placed stent with a 10-20% in-stent waist.  LAD with 20% mid vessel stenosis.  Very small caliber D2 branch with discrete 90% ostial stenosis.  Left circumflex with  mid segment.  Fills via left to left collaterals.  OM1 small caliber with discrete 80% stenosis.  RCA  proximal segment with discrete 95% distal stenosis that is ulcerated with KIERA II flow.  SVG to RCA is normal.  SVG to diagonal and OM are known to be occluded.  LIMA not used.  Successful PCI of the distal RCA through the SVG to RCA graft with RANCHO.      Lipid Panel          10/10/2023    08:24   Lipid Panel   Total Cholesterol 112    Triglycerides 96    HDL Cholesterol 37    VLDL Cholesterol 18    LDL Cholesterol  57      BUN   Date Value Ref Range Status   02/13/2024 27 (H) 8 - 23 mg/dL Final   12/28/2019 37 (H) 7 - 20 mg/dL Final     Creatinine   Date Value Ref  Range Status   02/13/2024 1.58 (H) 0.76 - 1.27 mg/dL Final   12/28/2019 1.5 0.7 - 1.5 mg/dL Final     Potassium   Date Value Ref Range Status   02/13/2024 4.0 3.5 - 5.2 mmol/L Final     Comment:     Slight hemolysis detected by analyzer. Result may be falsely elevated.   12/28/2019 4.4 3.5 - 5.1 mmol/L Final     ALT (SGPT)   Date Value Ref Range Status   02/13/2024 14 1 - 41 U/L Final   10/18/2019 24 13 - 69 U/L Final     AST (SGOT)   Date Value Ref Range Status   02/13/2024 16 1 - 40 U/L Final   10/18/2019 21 15 - 46 U/L Final           ASSESSMENT & PLAN     Diagnoses and all orders for this visit:    1. Persistent atrial fibrillation (Primary)  Assessment & Plan:  Patient with fairly new onset atrial fibrillation.  This was diagnosed in the ED setting  Patient is compliant with CPAP device.  He does not have known thyroid disease.  Denies daily alcohol use  ECG in clinic today reveals atrial fibrillation with heart rate in the low 100s  Recommended increasing metoprolol succinate to 25 mg/day  Patient anticoagulated with apixaban 2.5 mg for age and renal function, he denies bleeding complications  Patient educated on atrial fibrillation and its implications.    CHADS-VASc Risk Assessment              3 Total Score    1 Hypertension    2 Age >/= 75        Criteria that do not apply:    CHF    DM    PRIOR STROKE/TIA/THROMBO    Vascular Disease    Age 65-74    Sex: Female                2. Coronary artery disease involving native coronary artery of native heart without angina pectoris  Assessment & Plan:  Denies angina or dyspnea  Exercises in the form of walking  Continue GDMT:  Aspirin  Clopidogrel has been held with initiation of apixaban  Atorvastatin  Ranexa  Imdur  Losartan      3. Hx of CABG    4. Essential hypertension  Assessment & Plan:  Blood pressure controlled at 144/84  Continue losartan 50 mg/day  Continue furosemide 40 mg/day  Continue metoprolol succinate 25 mg/day      5. Other  hyperlipidemia  Assessment & Plan:   Goal LDL less than 70, LDL currently at goal  Continue atorvastatin      6. EVELINA on CPAP    Other orders  -     apixaban (ELIQUIS) 2.5 MG tablet tablet; Take 1 tablet by mouth 2 (Two) Times a Day.  Dispense: 180 tablet; Refill: 3  -     metoprolol succinate XL (TOPROL-XL) 25 MG 24 hr tablet; Take 1 tablet by mouth Every Evening for 360 days.  Dispense: 90 tablet; Refill: 3  -     ECG 12 Lead          Future Appointments         Provider Department Center    5/1/2024 8:45 AM (Arrive by 8:15 AM) [facility] PRIMARY CARE [facility]    5/7/2024 8:30 AM (Arrive by 8:00 AM) Dante Tabor MD [facility] PRIMARY CARE [facility]    7/31/2024 8:20 AM Sam Wright MD [facility] CARDIOLOGY [facility]              MEDICATIONS         Discharge Medications            Accurate as of February 20, 2024  2:09 PM. If you have any questions, ask your nurse or doctor.                Continue These Medications        Instructions Start Date   apixaban 2.5 MG tablet tablet  Commonly known as: ELIQUIS   2.5 mg, Oral, 2 Times Daily      aspirin 81 MG EC tablet   81 mg, Oral, Daily      atorvastatin 80 MG tablet  Commonly known as: LIPITOR   TAKE 1 TABLET BY MOUTH EVERY DAY      furosemide 40 MG tablet  Commonly known as: LASIX   40 mg, Oral, Daily      isosorbide mononitrate 30 MG 24 hr tablet  Commonly known as: IMDUR   TAKE 1 TABLET BY MOUTH DAILY      losartan 50 MG tablet  Commonly known as: COZAAR   50 mg, Oral, Daily      metoprolol succinate XL 25 MG 24 hr tablet  Commonly known as: TOPROL-XL   12.5 mg, Oral, Daily      nitroglycerin 0.4 MG SL tablet  Commonly known as: NITROSTAT   0.4 mg, Sublingual, As Needed      Omega-3 1000 MG capsule   1,200 mg, Oral, Daily      pantoprazole 40 MG EC tablet  Commonly known as: PROTONIX   40 mg, Oral, Daily      ranolazine 1000 MG 12 hr tablet  Commonly known as: RANEXA   TAKE 1 TABLET BY MOUTH  EVERY 12 HOURS      Vitamin D3 50 MCG (2000 UT) tablet   2,000 Units, Oral, Daily                   **Dragon Disclaimer:   Much of this encounter note is an electronic transcription/translation of spoken language to printed text. The electronic translation of spoken language may permit erroneous, or at times, nonsensical words or phrases to be inadvertently transcribed. Although I have reviewed the note for such errors, some may still exist.

## 2024-02-20 NOTE — ASSESSMENT & PLAN NOTE
Blood pressure controlled at 144/84  Continue losartan 50 mg/day  Continue furosemide 40 mg/day  Continue metoprolol succinate 25 mg/day

## 2024-02-20 NOTE — ASSESSMENT & PLAN NOTE
Denies angina or dyspnea  Exercises in the form of walking  Continue GDMT:  Aspirin  Clopidogrel has been held with initiation of apixaban  Atorvastatin  Ranexa  Imdur  Losartan

## 2024-03-04 DIAGNOSIS — I10 ESSENTIAL HYPERTENSION: ICD-10-CM

## 2024-03-04 RX ORDER — LOSARTAN POTASSIUM 50 MG/1
50 TABLET ORAL DAILY
Qty: 90 TABLET | Refills: 3 | Status: SHIPPED | OUTPATIENT
Start: 2024-03-04

## 2024-03-26 DIAGNOSIS — E78.49 OTHER HYPERLIPIDEMIA: Chronic | ICD-10-CM

## 2024-03-26 RX ORDER — ATORVASTATIN CALCIUM 80 MG/1
TABLET, FILM COATED ORAL
Qty: 90 TABLET | Refills: 3 | Status: SHIPPED | OUTPATIENT
Start: 2024-03-26

## 2024-04-01 ENCOUNTER — TELEPHONE (OUTPATIENT)
Age: 84
End: 2024-04-01
Payer: MEDICARE

## 2024-04-01 NOTE — TELEPHONE ENCOUNTER
Spoke to patient. He can't really tell me how long his SOA with exertion has been going on for. When he was seen on 2/20 he was in afib and his HR was low 100s, so his metoprolol was increased. He denies any swelling or weight gain and he does not monitor his HR or BP. I told him that he needs to start checking this 1-2 hours after AM meds so that way we can see if he needs further med adjustments. Encouraged him to do this and call us back in a week. He verbalized understanding.     Cheryl Romero RN  Triage [facility] [place]

## 2024-04-01 NOTE — TELEPHONE ENCOUNTER
The patient's daughter called to find out if her father has to increase the metoprolol he is taking because  he continues to have shortness of breath mainly when he does physical activity but he does not have chest pain any more.    Adilia 4/1 /2024

## 2024-04-01 NOTE — TELEPHONE ENCOUNTER
This encounter was created in error - please disregard.    Assisted Elliot with erroneous encounter    Az graves  4/1/24  122pm

## 2024-04-01 NOTE — TELEPHONE ENCOUNTER
The patient's daughter called to find out if her father has to increase the metoprolol that he is taking because he is continues to having  shortness of breath mainly when he does physical activity but he does not have chest pain.

## 2024-04-05 ENCOUNTER — TELEPHONE (OUTPATIENT)
Age: 84
End: 2024-04-05
Payer: MEDICARE

## 2024-04-05 NOTE — TELEPHONE ENCOUNTER
I reviewed his log which shows blood pressure anywhere between 95/69 up to 143/93.  Most values are between 100-130/70-80.  His pulse is between 64-91.  I do not feel he needs a higher dose of metoprolol and should continue the full tablet of 25 mg each night.  Okay to keep his July appointment with Dr. Wright to follow-up

## 2024-04-05 NOTE — TELEPHONE ENCOUNTER
Notified patient of results/recommendations. Patient verbalized understanding.    Cheryl Romero RN  [place] [facility] [place]

## 2024-04-18 ENCOUNTER — TELEPHONE (OUTPATIENT)
Dept: CARDIOLOGY | Facility: CLINIC | Age: 84
End: 2024-04-18
Payer: MEDICARE

## 2024-04-18 ENCOUNTER — HOSPITAL ENCOUNTER (EMERGENCY)
Facility: HOSPITAL | Age: 84
Discharge: HOME | End: 2024-04-18
Attending: EMERGENCY MEDICINE
Payer: MEDICARE

## 2024-04-18 ENCOUNTER — APPOINTMENT (OUTPATIENT)
Dept: GENERAL RADIOLOGY | Facility: HOSPITAL | Age: 84
End: 2024-04-18
Payer: MEDICARE

## 2024-04-18 VITALS
DIASTOLIC BLOOD PRESSURE: 83 MMHG | HEART RATE: 66 BPM | TEMPERATURE: 97.2 F | WEIGHT: 195.99 LBS | SYSTOLIC BLOOD PRESSURE: 120 MMHG | BODY MASS INDEX: 27.44 KG/M2 | OXYGEN SATURATION: 98 % | RESPIRATION RATE: 16 BRPM | HEIGHT: 71 IN

## 2024-04-18 DIAGNOSIS — N18.9 CHRONIC RENAL IMPAIRMENT, UNSPECIFIED CKD STAGE: ICD-10-CM

## 2024-04-18 DIAGNOSIS — R07.9 ACUTE CHEST PAIN: Primary | ICD-10-CM

## 2024-04-18 DIAGNOSIS — D64.9 ANEMIA, UNSPECIFIED TYPE: ICD-10-CM

## 2024-04-18 LAB
ALBUMIN SERPL-MCNC: 4.1 G/DL (ref 3.5–5.2)
ALBUMIN/GLOB SERPL: 2 G/DL
ALP SERPL-CCNC: 52 U/L (ref 39–117)
ALT SERPL W P-5'-P-CCNC: 11 U/L (ref 1–41)
ANION GAP SERPL CALCULATED.3IONS-SCNC: 9.8 MMOL/L (ref 5–15)
APTT PPP: 27.7 SECONDS (ref 22.7–35.4)
AST SERPL-CCNC: 15 U/L (ref 1–40)
BASOPHILS # BLD AUTO: 0.06 10*3/MM3 (ref 0–0.2)
BASOPHILS NFR BLD AUTO: 0.9 % (ref 0–1.5)
BILIRUB SERPL-MCNC: 1 MG/DL (ref 0–1.2)
BUN SERPL-MCNC: 38 MG/DL (ref 8–23)
BUN/CREAT SERPL: 22.9 (ref 7–25)
CALCIUM SPEC-SCNC: 10.1 MG/DL (ref 8.6–10.5)
CHLORIDE SERPL-SCNC: 105 MMOL/L (ref 98–107)
CO2 SERPL-SCNC: 26.2 MMOL/L (ref 22–29)
CREAT SERPL-MCNC: 1.66 MG/DL (ref 0.76–1.27)
DEPRECATED RDW RBC AUTO: 42.7 FL (ref 37–54)
EGFRCR SERPLBLD CKD-EPI 2021: 40.9 ML/MIN/1.73
EOSINOPHIL # BLD AUTO: 0.05 10*3/MM3 (ref 0–0.4)
EOSINOPHIL NFR BLD AUTO: 0.8 % (ref 0.3–6.2)
ERYTHROCYTE [DISTWIDTH] IN BLOOD BY AUTOMATED COUNT: 12.3 % (ref 12.3–15.4)
GEN 5 2HR TROPONIN T REFLEX: 23 NG/L
GLOBULIN UR ELPH-MCNC: 2.1 GM/DL
GLUCOSE SERPL-MCNC: 106 MG/DL (ref 65–99)
HCT VFR BLD AUTO: 39.1 % (ref 37.5–51)
HGB BLD-MCNC: 12.7 G/DL (ref 13–17.7)
IMM GRANULOCYTES # BLD AUTO: 0.02 10*3/MM3 (ref 0–0.05)
IMM GRANULOCYTES NFR BLD AUTO: 0.3 % (ref 0–0.5)
INR PPP: 1.51 (ref 0.9–1.1)
LYMPHOCYTES # BLD AUTO: 1.07 10*3/MM3 (ref 0.7–3.1)
LYMPHOCYTES NFR BLD AUTO: 16.7 % (ref 19.6–45.3)
MAGNESIUM SERPL-MCNC: 2.2 MG/DL (ref 1.6–2.4)
MCH RBC QN AUTO: 30.8 PG (ref 26.6–33)
MCHC RBC AUTO-ENTMCNC: 32.5 G/DL (ref 31.5–35.7)
MCV RBC AUTO: 94.7 FL (ref 79–97)
MONOCYTES # BLD AUTO: 0.63 10*3/MM3 (ref 0.1–0.9)
MONOCYTES NFR BLD AUTO: 9.8 % (ref 5–12)
NEUTROPHILS NFR BLD AUTO: 4.57 10*3/MM3 (ref 1.7–7)
NEUTROPHILS NFR BLD AUTO: 71.5 % (ref 42.7–76)
NRBC BLD AUTO-RTO: 0 /100 WBC (ref 0–0.2)
NT-PROBNP SERPL-MCNC: 1042 PG/ML (ref 0–1800)
PLATELET # BLD AUTO: 166 10*3/MM3 (ref 140–450)
PMV BLD AUTO: 10.6 FL (ref 6–12)
POTASSIUM SERPL-SCNC: 4.3 MMOL/L (ref 3.5–5.2)
PROT SERPL-MCNC: 6.2 G/DL (ref 6–8.5)
PROTHROMBIN TIME: 18.4 SECONDS (ref 11.7–14.2)
RBC # BLD AUTO: 4.13 10*6/MM3 (ref 4.14–5.8)
SODIUM SERPL-SCNC: 141 MMOL/L (ref 136–145)
TROPONIN T DELTA: -2 NG/L
TROPONIN T SERPL HS-MCNC: 25 NG/L
WBC NRBC COR # BLD AUTO: 6.4 10*3/MM3 (ref 3.4–10.8)

## 2024-04-18 PROCEDURE — 36415 COLL VENOUS BLD VENIPUNCTURE: CPT

## 2024-04-18 PROCEDURE — 93005 ELECTROCARDIOGRAM TRACING: CPT

## 2024-04-18 PROCEDURE — 85025 COMPLETE CBC W/AUTO DIFF WBC: CPT | Performed by: PHYSICIAN ASSISTANT

## 2024-04-18 PROCEDURE — 83735 ASSAY OF MAGNESIUM: CPT | Performed by: PHYSICIAN ASSISTANT

## 2024-04-18 PROCEDURE — 85610 PROTHROMBIN TIME: CPT | Performed by: PHYSICIAN ASSISTANT

## 2024-04-18 PROCEDURE — 71045 X-RAY EXAM CHEST 1 VIEW: CPT

## 2024-04-18 PROCEDURE — 84484 ASSAY OF TROPONIN QUANT: CPT | Performed by: PHYSICIAN ASSISTANT

## 2024-04-18 PROCEDURE — 83880 ASSAY OF NATRIURETIC PEPTIDE: CPT | Performed by: PHYSICIAN ASSISTANT

## 2024-04-18 PROCEDURE — 80053 COMPREHEN METABOLIC PANEL: CPT | Performed by: PHYSICIAN ASSISTANT

## 2024-04-18 PROCEDURE — 99284 EMERGENCY DEPT VISIT MOD MDM: CPT

## 2024-04-18 PROCEDURE — 93010 ELECTROCARDIOGRAM REPORT: CPT | Performed by: INTERNAL MEDICINE

## 2024-04-18 PROCEDURE — 85730 THROMBOPLASTIN TIME PARTIAL: CPT | Performed by: PHYSICIAN ASSISTANT

## 2024-04-18 RX ORDER — SODIUM CHLORIDE 0.9 % (FLUSH) 0.9 %
10 SYRINGE (ML) INJECTION AS NEEDED
Status: DISCONTINUED | OUTPATIENT
Start: 2024-04-18 | End: 2024-04-18 | Stop reason: HOSPADM

## 2024-04-18 NOTE — DISCHARGE INSTRUCTIONS
Follow-up with Dr. Wright or someone in his office in the next week.  Follow-up with primary care provider.  Take all medications as prescribed.  Return to emergency department for any worsening symptoms.

## 2024-04-18 NOTE — ED PROVIDER NOTES
EMERGENCY DEPARTMENT ENCOUNTER  Room Number:  08/08  PCP: Dante Tabor MD  Independent Historians: Patient      HPI:  Chief Complaint: had concerns including Chest Pain.       A complete HPI/ROS/PMH/PSH/SH/FH are unobtainable due to: None    Chronic or social conditions impacting patient care (Social Determinants of Health): None      Context: Kenn Brito is a 83 y.o. male with a medical history of hypertension, hyperlipidemia, chronic kidney disease, and CAD with CABG and stents who presents to the ED c/o acute chest pain intermittently since 10 PM last night. He describes the pain as an ache in the center of his chest without radiation. Patient states that putting pressure on the area of pain provides relief. He cannot determine any other alleviating or provoking factors. Patient does not have any pain currently. He notes that this pain feels different than his previous MI.       Review of prior external notes (non-ED) -and- Review of prior external test results outside of this encounter:  Patient seen in office by cardiology on 2/20/2024 for persistent atrial fibrillation.  Reviewed assessment and plan.  Patient will transition from Plavix to Eliquis.  Patient will follow-up in office as scheduled.  Reviewed labs collected on 2/13/2024.  CBC with hemoglobin 14.0, CMP with creatinine 1.58.    Prescription drug monitoring program review:     N/A    PAST MEDICAL HISTORY  Active Ambulatory Problems     Diagnosis Date Noted    GERD (gastroesophageal reflux disease) 05/20/2019    Other hyperlipidemia 05/20/2019    Essential hypertension 05/20/2019    EVELINA on CPAP 05/20/2019    PVC (premature ventricular contraction) 11/23/2020    Coronary artery disease involving native coronary artery of native heart without angina pectoris 01/25/2021    Sinus pause 03/04/2021    Stage 3a chronic kidney disease 06/17/2021    Persistent atrial fibrillation 02/20/2024    Hx of CABG 02/20/2024     Resolved Ambulatory Problems      Diagnosis Date Noted    Increased PTH level 05/20/2019    Multiple lung nodules on CT 11/21/2019     Past Medical History:   Diagnosis Date    Abnormal ECG     Atrial fibrillation     Coronary artery disease     Herpes zoster     Hypercalcemia     Hyperlipidemia     Hypertension     Myocardial infarction     Renal cyst     Sciatica     Sleep apnea     Tubular adenoma of colon 2014         PAST SURGICAL HISTORY  Past Surgical History:   Procedure Laterality Date    CARDIAC CATHETERIZATION  03/2016    CARDIAC CATHETERIZATION Left 03/26/2013    x1 stent [facility], Dr. Andrei Lutz    CARDIAC CATHETERIZATION N/A 01/28/2021    Procedure: Coronary angiography;  Surgeon: Jaguar Umana MD;  Location:  [facility];  Service: Cardiovascular;  Laterality: N/A;    CARDIAC CATHETERIZATION N/A 01/28/2021    Procedure: Left heart cath;  Surgeon: Jaguar Umana MD;  Location:  [facility];  Service: Cardiovascular;  Laterality: N/A;    CARDIAC CATHETERIZATION N/A 01/28/2021    Procedure: Left ventriculography;  Surgeon: Jaguar Umana MD;  Location:  [facility];  Service: Cardiovascular;  Laterality: N/A;    CARDIAC CATHETERIZATION N/A 01/28/2021    Procedure: Saphenous Vein Graft;  Surgeon: Jaguar Umana MD;  Location:  [facility];  Service: Cardiovascular;  Laterality: N/A;    CARDIAC CATHETERIZATION N/A 01/28/2021    Procedure: Resting Full Cycle Ratio;  Surgeon: Jaguar Umana MD;  Location: [facility];  Service: Cardiovascular;  Laterality: N/A;    CARDIAC CATHETERIZATION N/A 01/28/2021    Procedure: Stent RANCHO coronary;  Surgeon: Jaguar Umana MD;  Location: [facility] C[facility] LOC[facility];  Service: Cardiovascular;  Laterality: N/A;    CARDIAC CATHETERIZATION N/A 01/28/2021    Procedure: Stent RANCHO bypass graft-RCA;  Surgeon: Jaguar Umana MD;  Location: [facility] C[facility] LOC[facility];  Service:  Cardiovascular;  Laterality: N/A;    CARDIAC CATHETERIZATION N/A 2021    Procedure: CORONARY ANGIOGRAPHY;  Surgeon: Freedom Mccollum MD;  Location:  [facility];  Service: Cardiovascular;  Laterality: N/A;    CARDIAC CATHETERIZATION N/A 2021    Procedure: Stent RANCHO bypass graft;  Surgeon: Freedom Mccollum MD;  Location:  [facility];  Service: Cardiovascular;  Laterality: N/A;    CARDIAC CATHETERIZATION  2021    Procedure: Saphenous Vein Graft;  Surgeon: Freedom Mccollum MD;  Location:  [facility];  Service: Cardiovascular;;    COLONOSCOPY  10/29/2014    CORONARY ARTERY BYPASS GRAFT  2000    x3 vessel    INTERVENTIONAL RADIOLOGY PROCEDURE N/A 2021    Procedure: Intravascular Ultrasound;  Surgeon: Jaguar Umana MD;  Location:  [facility];  Service: Cardiovascular;  Laterality: N/A;    REPLACEMENT TOTAL KNEE Right 2019         FAMILY HISTORY  Family History   Problem Relation Age of Onset    Throat cancer Father     Peripheral vascular disease Brother     Diabetes Brother     Heart disease Brother     Heart attack Brother     Crohn's disease Daughter          SOCIAL HISTORY  Social History     Socioeconomic History    Marital status:    Tobacco Use    Smoking status: Former     Current packs/day: 0.00     Average packs/day: 0.5 packs/day for 20.0 years (10.0 ttl pk-yrs)     Types: Cigarettes     Start date: 1968     Quit date: 1988     Years since quittin.3    Smokeless tobacco: Never   Vaping Use    Vaping status: Never Used   Substance and Sexual Activity    Alcohol use: Yes     Alcohol/week: 0.0 - 1.0 standard drinks of alcohol     Comment: rare social    Drug use: No    Sexual activity: Defer         ALLERGIES  Patient has no known allergies.      REVIEW OF SYSTEMS  Review of Systems   Constitutional:  Negative for chills, diaphoresis and fever.   HENT:  Negative for ear pain  and sore throat.    Respiratory:  Negative for cough and shortness of breath.    Cardiovascular:  Positive for chest pain. Negative for palpitations.   Gastrointestinal:  Negative for abdominal pain and vomiting.   Genitourinary:  Negative for dysuria and hematuria.   Musculoskeletal:  Negative for arthralgias and joint swelling.   Skin:  Negative for pallor and rash.   Neurological:  Negative for syncope and headaches.   Psychiatric/Behavioral:  Negative for confusion and hallucinations.      Included in HPI  All systems reviewed and negative except for those discussed in HPI.      PHYSICAL EXAM    I have reviewed the triage vital signs and nursing notes.    ED Triage Vitals [04/18/24 0932]   Temp Heart Rate Resp BP SpO2   97.2 °F (36.2 °C) 86 16 -- 96 %      Temp src Heart Rate Source Patient Position BP Location FiO2 (%)   -- -- -- -- --       Physical Exam  Constitutional:       General: He is not in acute distress.     Appearance: Normal appearance.   HENT:      Head: Normocephalic and atraumatic.      Nose: Nose normal.      Mouth/Throat:      Mouth: Mucous membranes are moist.   Eyes:      Conjunctiva/sclera: Conjunctivae normal.      Pupils: Pupils are equal, round, and reactive to light.   Cardiovascular:      Rate and Rhythm: Normal rate. Rhythm irregular.      Pulses: Normal pulses.      Heart sounds: Normal heart sounds.      Comments: Atrial fibrillation  Pulmonary:      Effort: Pulmonary effort is normal.      Breath sounds: Normal breath sounds.   Abdominal:      General: There is no distension.   Musculoskeletal:         General: Normal range of motion.      Cervical back: Normal range of motion and neck supple.   Skin:     General: Skin is warm.      Capillary Refill: Capillary refill takes less than 2 seconds.   Neurological:      General: No focal deficit present.      Mental Status: He is alert and oriented to person, place, and time.   Psychiatric:         Mood and Affect: Mood normal.            LAB RESULTS  Recent Results (from the past 24 hour(s))   ECG 12 Lead Chest Pain    Collection Time: 04/18/24  9:35 AM   Result Value Ref Range    QT Interval 427 ms    QTC Interval 505 ms   High Sensitivity Troponin T    Collection Time: 04/18/24 10:30 AM    Specimen: Arm, Left; Blood   Result Value Ref Range    HS Troponin T 25 (H) <22 ng/L   BNP    Collection Time: 04/18/24 10:30 AM    Specimen: Arm, Left; Blood   Result Value Ref Range    proBNP 1,042.0 0.0 - 1,800.0 pg/mL   Comprehensive Metabolic Panel    Collection Time: 04/18/24 10:30 AM    Specimen: Arm, Left; Blood   Result Value Ref Range    Glucose 106 (H) 65 - 99 mg/dL    BUN 38 (H) 8 - 23 mg/dL    Creatinine 1.66 (H) 0.76 - 1.27 mg/dL    Sodium 141 136 - 145 mmol/L    Potassium 4.3 3.5 - 5.2 mmol/L    Chloride 105 98 - 107 mmol/L    CO2 26.2 22.0 - 29.0 mmol/L    Calcium 10.1 8.6 - 10.5 mg/dL    Total Protein 6.2 6.0 - 8.5 g/dL    Albumin 4.1 3.5 - 5.2 g/dL    ALT (SGPT) 11 1 - 41 U/L    AST (SGOT) 15 1 - 40 U/L    Alkaline Phosphatase 52 39 - 117 U/L    Total Bilirubin 1.0 0.0 - 1.2 mg/dL    Globulin 2.1 gm/dL    A/G Ratio 2.0 g/dL    BUN/Creatinine Ratio 22.9 7.0 - 25.0    Anion Gap 9.8 5.0 - 15.0 mmol/L    eGFR 40.9 (L) >60.0 mL/min/1.73   Protime-INR    Collection Time: 04/18/24 10:30 AM    Specimen: Arm, Left; Blood   Result Value Ref Range    Protime 18.4 (H) 11.7 - 14.2 Seconds    INR 1.51 (H) 0.90 - 1.10   aPTT    Collection Time: 04/18/24 10:30 AM    Specimen: Arm, Left; Blood   Result Value Ref Range    PTT 27.7 22.7 - 35.4 seconds   Magnesium    Collection Time: 04/18/24 10:30 AM    Specimen: Arm, Left; Blood   Result Value Ref Range    Magnesium 2.2 1.6 - 2.4 mg/dL   CBC Auto Differential    Collection Time: 04/18/24 10:30 AM    Specimen: Arm, Left; Blood   Result Value Ref Range    WBC 6.40 3.40 - 10.80 10*3/mm3    RBC 4.13 (L) 4.14 - 5.80 10*6/mm3    Hemoglobin 12.7 (L) 13.0 - 17.7 g/dL    Hematocrit 39.1 37.5 - 51.0 %    MCV  94.7 79.0 - 97.0 fL    MCH 30.8 26.6 - 33.0 pg    MCHC 32.5 31.5 - 35.7 g/dL    RDW 12.3 12.3 - 15.4 %    RDW-SD 42.7 37.0 - 54.0 fl    MPV 10.6 6.0 - 12.0 fL    Platelets 166 140 - 450 10*3/mm3    Neutrophil % 71.5 42.7 - 76.0 %    Lymphocyte % 16.7 (L) 19.6 - 45.3 %    Monocyte % 9.8 5.0 - 12.0 %    Eosinophil % 0.8 0.3 - 6.2 %    Basophil % 0.9 0.0 - 1.5 %    Immature Grans % 0.3 0.0 - 0.5 %    Neutrophils, Absolute 4.57 1.70 - 7.00 10*3/mm3    Lymphocytes, Absolute 1.07 0.70 - 3.10 10*3/mm3    Monocytes, Absolute 0.63 0.10 - 0.90 10*3/mm3    Eosinophils, Absolute 0.05 0.00 - 0.40 10*3/mm3    Basophils, Absolute 0.06 0.00 - 0.20 10*3/mm3    Immature Grans, Absolute 0.02 0.00 - 0.05 10*3/mm3    nRBC 0.0 0.0 - 0.2 /100 WBC   High Sensitivity Troponin T 2Hr    Collection Time: 04/18/24 12:34 PM    Specimen: Blood   Result Value Ref Range    HS Troponin T 23 (H) <22 ng/L    Troponin T Delta -2 >=-4 - <+4 ng/L         RADIOLOGY  XR Chest 1 View    Result Date: 4/18/2024  XR CHEST 1 VW-  DATE OF EXAM: 4/18/2024 10:34 AM  INDICATION: Chest pain.  COMPARISON: Radiographs 2/13/2024 and 5/11/2021. CT 11/28/2022.  TECHNIQUE: A single portable AP view of the chest was obtained.  FINDINGS: Lordotic positioning. Overlying artifacts. Stable sternotomy wires and postoperative changes from CABG. Calcified remnants of prior granulomatous disease. Similar-appearing scarring in the subpleural mid right lung. No new focal lung consolidation. No pneumothorax. Unchanged cardiac and mediastinal contours. Calcified atherosclerotic disease in the tortuous thoracic aorta. Incompletely evaluated chronic changes in the thoracic spine and both shoulders. No acute osseous abnormality is identified.      Stable chronic findings with no active disease. No significant interval change.  This report was finalized on 4/18/2024 10:54 AM by Bill Joseph MD on Workstation: [id number]         MEDICATIONS GIVEN IN ER  Medications   sodium chloride 0.9 %  flush 10 mL (has no administration in time range)         ORDERS PLACED DURING THIS VISIT:  Orders Placed This Encounter   Procedures    XR Chest 1 View    High Sensitivity Troponin T    BNP    Comprehensive Metabolic Panel    Protime-INR    aPTT    Magnesium    CBC Auto Differential    High Sensitivity Troponin T 2Hr    Inpatient Cardiology Consult    ECG 12 Lead Chest Pain    Insert Peripheral IV    CBC & Differential         OUTPATIENT MEDICATION MANAGEMENT:  Current Facility-Administered Medications Ordered in Epic   Medication Dose Route Frequency Provider Last Rate Last Admin    sodium chloride 0.9 % flush 10 mL  10 mL Intravenous PRN Melissa Anderson PA-C         Current Outpatient Medications Ordered in Epic   Medication Sig Dispense Refill    apixaban (ELIQUIS) 2.5 MG tablet tablet Take 1 tablet by mouth 2 (Two) Times a Day. 180 tablet 3    aspirin 81 MG EC tablet Take 1 tablet by mouth Daily.      atorvastatin (LIPITOR) 80 MG tablet TAKE 1 TABLET BY MOUTH EVERY DAY 90 tablet 3    Cholecalciferol (VITAMIN D3) 2000 units tablet Take 1 tablet by mouth Daily.      furosemide (LASIX) 40 MG tablet TAKE 1 TABLET BY MOUTH DAILY 90 tablet 3    isosorbide mononitrate (IMDUR) 30 MG 24 hr tablet TAKE 1 TABLET BY MOUTH DAILY 90 tablet 3    losartan (COZAAR) 50 MG tablet TAKE 1 TABLET BY MOUTH DAILY 90 tablet 3    metoprolol succinate XL (TOPROL-XL) 25 MG 24 hr tablet Take 1 tablet by mouth Every Evening for 360 days. 90 tablet 3    nitroglycerin (NITROSTAT) 0.4 MG SL tablet Place 1 tablet under the tongue As Needed for Chest Pain. 25 tablet 1    Omega-3 1000 MG capsule Take 1,200 mg by mouth Daily.      pantoprazole (PROTONIX) 40 MG EC tablet Take 1 tablet by mouth Daily. 90 tablet 3    ranolazine (RANEXA) 1000 MG 12 hr tablet TAKE 1 TABLET BY MOUTH EVERY 12 HOURS 180 tablet 3           PROGRESS, DATA ANALYSIS, CONSULTS, AND MEDICAL DECISION MAKING  All labs have been independently interpreted by me.  All radiology  studies have been reviewed by me. All EKG's have been independently viewed and interpreted by me.  Discussion below represents my analysis of pertinent findings related to patient's condition, differential diagnosis, treatment plan and final disposition.    Differential diagnosis includes but is not limited to ACS, GERD, musculoskeletal pain.    Clinical Scores: HEART Score: 5                   ED Course as of 04/18/24 1459   Thu Apr 18, 2024   2508 I spoke with Dr. Wright with cardiology.  Reviewed patient presentation and ED findings.  He recommends repeating troponin.  If repeat troponin is flat, patient is okay for discharge with plan for close follow-up in the office. [MP]   3297 Patient presents to emergency department with episode of chest pain that resolved upon arrival to the ED.  Worked up with labs, chest x-ray, EKG.  Serial troponins flat, EKG nonischemic.  I discussed with cardiology who state patient can follow-up in office.  Patient remained chest pain-free throughout emergency department visit.  I encouraged the patient to follow-up closely with cardiology.  Discussed ED return precautions.  She is otherwise well-appearing, hemodynamically stable, and therefore appropriate for discharge. [MP]      ED Course User Index  [MP] Melissa Anderson PA-C             AS OF 14:58 EDT VITALS:    BP - 120/83  HR - 66  TEMP - 97.2 °F (36.2 °C)  O2 SATS - 98%    COMPLEXITY OF CARE  Admission was considered but after careful review of the patient's presentation, physical examination, diagnostic results, and response to treatment the patient may be safely discharged with outpatient follow-up.      DIAGNOSIS  Final diagnoses:   Acute chest pain   Chronic renal impairment, unspecified CKD stage   Anemia, unspecified type         DISPOSITION  ED Disposition       ED Disposition   Discharge    Condition   Stable    Comment   --                Please note that portions of this document were completed with a voice  recognition program.    Note Disclaimer: At [facility], we believe that sharing information builds trust and better relationships. You are receiving this note because you recently visited [facility]. It is possible you will see health information before a provider has talked with you about it. This kind of information can be easy to misunderstand. To help you fully understand what it means for your health, we urge you to discuss this note with your provider.         Melissa Anderson PA-C  04/18/24 1451

## 2024-04-18 NOTE — TELEPHONE ENCOUNTER
Dr. Wright/Nicki,    Pt's wife called and wanted you to know that patient went to ER with chest pain and is currently still there.    Thank you,    Lexis Hill, RN  Triage [facility] [place]  04/18/24 12:31 EDT

## 2024-04-18 NOTE — ED PROVIDER NOTES
SHARED VISIT: This visit was performed by BOTH a physician and an APC. The substantive portion of the medical decision making was performed by this attesting physician who made or approved the management plan and takes responsibility for patient management. All studies in the APC note (if performed) were independently interpreted by me.      The ADITYA and I have discussed this patient's history, physical exam, and treatment plan.  I have reviewed the documentation and personally had a face to face interaction with the patient. I affirm the documentation and agree with the treatment and plan.  The attached note describes my personal findings.       I provided a substantive portion of the care of the patient.  I personally performed the physical exam in its entirety, and below are my findings.        History  83-year-old male with history of A-fib, chronic renal failure presents with intermittent chest pain off and on since last night.  Currently pain-free.  Pain is improved with pushing down on the lower part of his sternum.    Physical Exam  Vital Signs reviewed  GENERAL: Alert pleasant male no obvious distress.  Triage vitals reviewed and are fairly benign.  Blood pressure 131/80 and heart rate 69.  HENT: nares patent  EYES: no scleral icterus  CV: Irregularly irregular with pulse in the 70s  RESPIRATORY: normal effort-O2 sats upper 90s room air  ABDOMEN: soft  MUSCULOSKELETAL: no deformity  NEURO: Strength sensation and coordination are grossly intact.  Speech and mentation are unremarkable  SKIN: warm, dry      Assessment and Data Review    ED Course as of 04/18/24 1613   Thu Apr 18, 2024   7749 I spoke with Dr. Wright with cardiology.  Reviewed patient presentation and ED findings.  He recommends repeating troponin.  If repeat troponin is flat, patient is okay for discharge with plan for close follow-up in the office. [MP]   0075 Patient presents to emergency department with episode of chest pain that resolved  upon arrival to the ED.  Worked up with labs, chest x-ray, EKG.  Serial troponins flat, EKG nonischemic.  I discussed with cardiology who state patient can follow-up in office.  Patient remained chest pain-free throughout emergency department visit.  I encouraged the patient to follow-up closely with cardiology.  Discussed ED return precautions.  She is otherwise well-appearing, hemodynamically stable, and therefore appropriate for discharge. [MP]      ED Course User Index  [MP] Melissa Anderson PA-C     EKG independently interpreted by me  Time 9:35 AM  A-fib 84  P waves-A-fib  QRS-normal axis, IVCD  ST, T waves-nonspecific changes  Not significant change when compared to 2/2024      Chest x-ray independently interpreted by me shows no obvious acute disease.    I discussed evaluation treatment of this patient with JHONY Anderson.  Labs notable for mildly elevated high-sensitivity troponin of 25 (21 when measured several months ago).  Troponin elevation may be related to chronic renal failure, A-fib.  Will get repeat troponin and reassess.     Archie Yeboah MD  04/18/24 4081

## 2024-04-19 LAB
QT INTERVAL: 427 MS
QTC INTERVAL: 505 MS

## 2024-04-25 ENCOUNTER — OFFICE VISIT (OUTPATIENT)
Dept: CARDIOLOGY | Facility: CLINIC | Age: 84
End: 2024-04-25
Payer: MEDICARE

## 2024-04-25 VITALS
HEIGHT: 60 IN | HEART RATE: 86 BPM | OXYGEN SATURATION: 97 % | WEIGHT: 202 LBS | DIASTOLIC BLOOD PRESSURE: 80 MMHG | SYSTOLIC BLOOD PRESSURE: 130 MMHG | BODY MASS INDEX: 39.66 KG/M2

## 2024-04-25 DIAGNOSIS — I48.19 PERSISTENT ATRIAL FIBRILLATION: ICD-10-CM

## 2024-04-25 DIAGNOSIS — Z95.1 HX OF CABG: ICD-10-CM

## 2024-04-25 DIAGNOSIS — E78.49 OTHER HYPERLIPIDEMIA: ICD-10-CM

## 2024-04-25 DIAGNOSIS — I10 ESSENTIAL HYPERTENSION: ICD-10-CM

## 2024-04-25 DIAGNOSIS — I25.10 CORONARY ARTERY DISEASE INVOLVING NATIVE CORONARY ARTERY OF NATIVE HEART WITHOUT ANGINA PECTORIS: Primary | ICD-10-CM

## 2024-04-25 DIAGNOSIS — G47.33 OSA ON CPAP: ICD-10-CM

## 2024-04-25 NOTE — ASSESSMENT & PLAN NOTE
BP controlled at home averaging in the 120s  Continue losartan 50 mg/day  Continue furosemide 40 mg/day  Continue metoprolol succinate 25 mg/day

## 2024-04-25 NOTE — ASSESSMENT & PLAN NOTE
"Recent emergency department visit for episodes of midsternal/epigastric chest pain that patient describes as intermittent and indigestion  High-sensitivity troponins were slightly elevated in the 20s but remained flat on repeat in the setting of chronic renal disease  ECG is nonischemic, I personally reviewed tracings  Patient had most recent cardiac catheterization in 2021, we have reviewed those results.  Offered patient a stress test to evaluate for possible ischemia, at this point patient would prefer to take a \"watch and wait\" approach and will call the office for any recurrent symptoms.  "

## 2024-04-25 NOTE — PROGRESS NOTES
CARDIOLOGY        Patient Name: Kenn Brito  :1940  Age: 83 y.o.  Primary Cardiologist: Sam Wright MD  Encounter Provider:  FALLON Liu    Date of Service: 24        CHIEF COMPLAINT / REASON FOR OFFICE VISIT     Hospital Follow Up Visit, Chest Pain, Coronary Artery Disease, and Atrial Fibrillation      HISTORY OF PRESENT ILLNESS       Coronary Artery Disease  Pertinent negatives include no leg swelling or weight gain.   Atrial Fibrillation  Past medical history includes atrial fibrillation and CAD.       Kenn Brito is a 83 y.o. male who presents today for ED follow up.    Pt has a  history significant for CAD with prior CABG, hyperlipidemia, PVC, hypertension, stage III renal disease.    Medical record review reveals that patient was evaluated in the ED 2024.  Patient was complaining of dyspnea.  Patient was found to be in new onset atrial fibrillation.  Heart rate was controlled.  Patient was placed on metoprolol succinate as well as low-dose apixaban for weights and creatinine level.    Medical record review reveals that patient was evaluated in the emergency department 2024 with acute chest pain that was described as an ache in the center of the chest.  Denies pain at the time of the visit.  Reported that this pain was different than previous MI.  High-sensitivity troponin 25, proBNP 1000, creatinine 1.66, hemoglobin 12.7.  I personally reviewed ECG tracings and my interpretation is normal sinus rhythm with no ischemic changes.  Patient was discharged with recommendations for close follow-up in our clinic.    Patient reports that on the date of his emergency department visit he had had a 1 to 2-day history of midsternal epigastric pain that he describes as indigestion.  Reports that when he woke with that morning and it had not resolved he worried and went to the emergency department.  He states that this is not similar to prior coronary interventional pain.   "Pain is currently resolved and he has had no more episodes.  He does report occasional dyspnea with exertion.    The following portions of the patient's history were reviewed and updated as appropriate: allergies, current medications, past family history, past medical history, past social history, past surgical history and problem list.      VITAL SIGNS     Visit Vitals  /80 (BP Location: Right arm, Patient Position: Sitting)   Pulse 86   Ht 71 cm (27.95\")   Wt 91.6 kg (202 lb)   SpO2 97%   .86 kg/m²           Wt Readings from Last 3 Encounters:   04/25/24 91.6 kg (202 lb)   04/18/24 88.9 kg (195 lb 15.8 oz)   02/20/24 88.9 kg (196 lb)     Body mass index is 181.86 kg/m².      REVIEW OF SYSTEMS   Review of Systems   Constitutional: Negative for chills, fever, weight gain and weight loss.   Cardiovascular:  Negative for leg swelling.   Respiratory:  Negative for cough, snoring and wheezing.    Hematologic/Lymphatic: Negative for bleeding problem. Does not bruise/bleed easily.   Skin:  Negative for color change.   Musculoskeletal:  Negative for falls, joint pain and myalgias.   Gastrointestinal:  Negative for melena.   Genitourinary:  Negative for hematuria.   Neurological:  Negative for excessive daytime sleepiness.   Psychiatric/Behavioral:  Negative for depression. The patient is not nervous/anxious.            PHYSICAL EXAMINATION     Constitutional:       Appearance: Normal appearance. Well-developed.   Eyes:      Conjunctiva/sclera: Conjunctivae normal.   Neck:      Vascular: No carotid bruit.   Pulmonary:      Effort: Pulmonary effort is normal.      Breath sounds: Normal breath sounds.   Cardiovascular:      Irregularly irregular rhythm. Normal S1. Normal S2.       Murmurs: There is no murmur.      No gallop.  No click. No rub.   Edema:     Peripheral edema absent.   Musculoskeletal: Normal range of motion. Skin:     General: Skin is warm and dry.   Neurological:      Mental Status: Alert and " oriented to person, place, and time.      GCS: GCS eye subscore is 4. GCS verbal subscore is 5. GCS motor subscore is 6.   Psychiatric:         Speech: Speech normal.         Behavior: Behavior normal.         Thought Content: Thought content normal.         Judgment: Judgment normal.           REVIEWED DATA     Procedures    Cardiac Procedures:  Myocardial perfusion PET stress/12/2021.  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  Additional medium sized, moderate to severe area of ischemia in the apex.  Impressions consistent with high risk study.  Cardiac catheterization 1/28/2021.  Left main 70% origin lesion with dampening of the catheter.  LAD 10-20% proximal disease, normal at midportion and distally there is first diagonal that is occluded.  Ramus is a large vessel with 30-40% disease.  Circumflex a small vessel with 70-80% lesion in the midportion not amenable to PCI.  RCA is dominant vessel 100% occluded.  LIMA widely patent not used his bypass.  SVG to diagonal was occluded.  SVG to OM1 is occluded.  SVG to RCA is widely patent but the distal portion there is a 70-80% degenerated lesion, native RCA has some luminal irregularities.  Patient treated with PCI and RANCHO.  ZIO monitor 2/24/2021.  Predominant rhythm was sinus.  PACs occurred occasionally.  PVCs occurred frequently with ventricular couplets bigeminy and trigeminy.  There was a 4 beat episode of nonsustained V. tach.  4 pauses with the longest pause lasting 5.3 seconds.  Cardiac catheterization 5/11/2021.  Left main.  It previously placed stent with a 10-20% in-stent waist.  LAD with 20% mid vessel stenosis.  Very small caliber D2 branch with discrete 90% ostial stenosis.  Left circumflex with  mid segment.  Fills via left to left collaterals.  OM1 small caliber with discrete 80% stenosis.  RCA  proximal segment with discrete 95% distal stenosis that is ulcerated with KIERA II flow.  SVG to RCA is normal.  SVG  "to diagonal and OM are known to be occluded.  LIMA not used.  Successful PCI of the distal RCA through the SVG to RCA graft with RANCHO.      Lipid Panel          10/10/2023    08:24   Lipid Panel   Total Cholesterol 112    Triglycerides 96    HDL Cholesterol 37    VLDL Cholesterol 18    LDL Cholesterol  57      BUN   Date Value Ref Range Status   04/18/2024 38 (H) 8 - 23 mg/dL Final   12/28/2019 37 (H) 7 - 20 mg/dL Final     Creatinine   Date Value Ref Range Status   04/18/2024 1.66 (H) 0.76 - 1.27 mg/dL Final   12/28/2019 1.5 0.7 - 1.5 mg/dL Final     Potassium   Date Value Ref Range Status   04/18/2024 4.3 3.5 - 5.2 mmol/L Final   12/28/2019 4.4 3.5 - 5.1 mmol/L Final     ALT (SGPT)   Date Value Ref Range Status   04/18/2024 11 1 - 41 U/L Final   10/18/2019 24 13 - 69 U/L Final     AST (SGOT)   Date Value Ref Range Status   04/18/2024 15 1 - 40 U/L Final   10/18/2019 21 15 - 46 U/L Final           ASSESSMENT & PLAN     Diagnoses and all orders for this visit:    1. Coronary artery disease involving native coronary artery of native heart without angina pectoris (Primary)  Assessment & Plan:  Recent emergency department visit for episodes of midsternal/epigastric chest pain that patient describes as intermittent and indigestion  High-sensitivity troponins were slightly elevated in the 20s but remained flat on repeat in the setting of chronic renal disease  ECG is nonischemic, I personally reviewed tracings  Patient had most recent cardiac catheterization in 2021, we have reviewed those results.  Offered patient a stress test to evaluate for possible ischemia, at this point patient would prefer to take a \"watch and wait\" approach and will call the office for any recurrent symptoms.      2. Hx of CABG    3. Persistent atrial fibrillation  Assessment & Plan:  Heart rate controlled in clinic  Patient compliant with CPAP  Continue metoprolol succinate 25 mg/day  Anticoagulated with apixaban 2.5 mg/day      4. Essential " hypertension  Assessment & Plan:  BP controlled at home averaging in the 120s  Continue losartan 50 mg/day  Continue furosemide 40 mg/day  Continue metoprolol succinate 25 mg/day      5. EVELINA on CPAP    6. Other hyperlipidemia  Assessment & Plan:  Continue atorvastatin            Future Appointments         Provider Department Center    5/1/2024 8:45 AM (Arrive by 8:15 AM) [facility] PRIMARY CARE [place]    5/7/2024 8:30 AM (Arrive by 8:00 AM) Dante Tabor MD [facility] PRIMARY CARE [place]    7/31/2024 9:00 AM Sam Wright MD [facility] CARDIOLOGY [place]              MEDICATIONS         Discharge Medications            Accurate as of April 25, 2024 10:46 AM. If you have any questions, ask your nurse or doctor.                Continue These Medications        Instructions Start Date   apixaban 2.5 MG tablet tablet  Commonly known as: ELIQUIS   2.5 mg, Oral, 2 Times Daily      aspirin 81 MG EC tablet   81 mg, Oral, Daily      atorvastatin 80 MG tablet  Commonly known as: LIPITOR   TAKE 1 TABLET BY MOUTH EVERY DAY      furosemide 40 MG tablet  Commonly known as: LASIX   40 mg, Oral, Daily      isosorbide mononitrate 30 MG 24 hr tablet  Commonly known as: IMDUR   TAKE 1 TABLET BY MOUTH DAILY      losartan 50 MG tablet  Commonly known as: COZAAR   50 mg, Oral, Daily      metoprolol succinate XL 25 MG 24 hr tablet  Commonly known as: TOPROL-XL   25 mg, Oral, Every Evening      nitroglycerin 0.4 MG SL tablet  Commonly known as: NITROSTAT   0.4 mg, Sublingual, As Needed      Omega-3 1000 MG capsule   1,200 mg, Oral, Daily      pantoprazole 40 MG EC tablet  Commonly known as: PROTONIX   40 mg, Oral, Daily      ranolazine 1000 MG 12 hr tablet  Commonly known as: RANEXA   TAKE 1 TABLET BY MOUTH EVERY 12 HOURS      Vitamin D3 50 MCG (2000 UT) tablet   2,000 Units, Oral, Daily                   **Dragon Disclaimer:   Much of this encounter note is an  electronic transcription/translation of spoken language to printed text. The electronic translation of spoken language may permit erroneous, or at times, nonsensical words or phrases to be inadvertently transcribed. Although I have reviewed the note for such errors, some may still exist.

## 2024-04-25 NOTE — ASSESSMENT & PLAN NOTE
Heart rate controlled in clinic  Patient compliant with CPAP  Continue metoprolol succinate 25 mg/day  Anticoagulated with apixaban 2.5 mg/day

## 2024-05-02 ENCOUNTER — TELEPHONE (OUTPATIENT)
Dept: CARDIOLOGY | Facility: CLINIC | Age: 84
End: 2024-05-02
Payer: MEDICARE

## 2024-05-02 DIAGNOSIS — I48.19 PERSISTENT ATRIAL FIBRILLATION: Primary | ICD-10-CM

## 2024-05-02 NOTE — TELEPHONE ENCOUNTER
Pt's PCP Dr. Brito called the office today requesting a callback from you today on this pt.  She saw him in the office today.    Thank you,    Sammie KIM RN  Triage [facility] [place]  05/02/24 12:52 EDT

## 2024-05-10 ENCOUNTER — TELEPHONE (OUTPATIENT)
Dept: INTERNAL MEDICINE | Facility: CLINIC | Age: 84
End: 2024-05-10
Payer: MEDICARE

## 2024-05-10 RX ORDER — ISOSORBIDE MONONITRATE 30 MG/1
TABLET, EXTENDED RELEASE ORAL
Qty: 90 TABLET | Refills: 3 | OUTPATIENT
Start: 2024-05-10

## 2024-05-15 DIAGNOSIS — I51.9 HEART DISEASE: ICD-10-CM

## 2024-05-15 RX ORDER — RANOLAZINE 1000 MG/1
1000 TABLET, EXTENDED RELEASE ORAL EVERY 12 HOURS
Qty: 180 TABLET | Refills: 3 | Status: SHIPPED | OUTPATIENT
Start: 2024-05-15

## 2024-06-05 NOTE — TELEPHONE ENCOUNTER
Caller: MARIANA CORTES    Relationship: Emergency Contact    Best call back number: [phone]    Medication needed:   Requested Prescriptions     Pending Prescriptions Disp Refills   • furosemide (LASIX) 20 MG tablet 90 tablet 1     Sig: Take 1 tablet by mouth Daily.       When do you need the refill by: 9/16/20    What details did the patient provide when requesting the medication: Pharmacy states they have contacted for refill. Patient is now out of meds and would like today.     Does the patient have less than a 3 day supply:  [x] Yes  [] No    What is the patient's preferred pharmacy:    [id number] [address] [place] [address] AT [facility] - [phone] [facility] [phone]   [phone]           Attending Attestation (For Attendings USE Only)...

## 2024-07-31 ENCOUNTER — OFFICE VISIT (OUTPATIENT)
Dept: CARDIOLOGY | Facility: CLINIC | Age: 84
End: 2024-07-31
Payer: MEDICARE

## 2024-07-31 VITALS
SYSTOLIC BLOOD PRESSURE: 130 MMHG | HEIGHT: 71 IN | DIASTOLIC BLOOD PRESSURE: 80 MMHG | WEIGHT: 189 LBS | BODY MASS INDEX: 26.46 KG/M2 | HEART RATE: 77 BPM | OXYGEN SATURATION: 98 %

## 2024-07-31 DIAGNOSIS — I25.10 CORONARY ARTERY DISEASE INVOLVING NATIVE CORONARY ARTERY OF NATIVE HEART WITHOUT ANGINA PECTORIS: ICD-10-CM

## 2024-07-31 DIAGNOSIS — I49.3 PVC (PREMATURE VENTRICULAR CONTRACTION): Primary | ICD-10-CM

## 2024-07-31 DIAGNOSIS — I10 ESSENTIAL HYPERTENSION: Chronic | ICD-10-CM

## 2024-07-31 DIAGNOSIS — I48.19 PERSISTENT ATRIAL FIBRILLATION: ICD-10-CM

## 2024-07-31 NOTE — PROGRESS NOTES
"      CARDIOLOGY    Sam Wright MD    ENCOUNTER DATE:  07/31/2024    Kenn Brito / 83 y.o. / male        CHIEF COMPLAINT / REASON FOR OFFICE VISIT     Coronary Artery Disease (04/25/2024 Follow up)  PVCs  Hypertension  Atrial fibrillation  HISTORY OF PRESENT ILLNESS       Coronary Artery Disease      Kenn Brito is a 83 y.o. male who presents today for reevaluation.  Overall he is doing much better.  He says that since lowering his beta-blocker he feels much better.  He is not complaining of any shortness of breath.  He said he parked is far away from the building as he could walk in and did well with this with no issues.  He denies chest pains palpitations syncope or near syncope.      The following portions of the patient's history were reviewed and updated as appropriate: allergies, current medications, past family history, past medical history, past social history, past surgical history and problem list.      VITAL SIGNS     Visit Vitals  /80 (BP Location: Left arm)   Pulse 77   Ht 180.3 cm (71\")   Wt 85.7 kg (189 lb)   SpO2 98%   BMI 26.36 kg/m²         Wt Readings from Last 3 Encounters:   07/31/24 85.7 kg (189 lb)   04/25/24 91.6 kg (202 lb)   04/18/24 88.9 kg (195 lb 15.8 oz)     Body mass index is 26.36 kg/m².      REVIEW OF SYSTEMS   ROS        PHYSICAL EXAMINATION     Vitals reviewed.   Constitutional:       Appearance: Healthy appearance.   Cardiovascular:      Normal rate. Irregularly irregular rhythm. Normal S1. Normal S2.       Murmurs: There is no murmur.      No gallop.  No click. No rub.   Pulses:     Intact distal pulses.   Edema:     Peripheral edema absent.           REVIEWED DATA     Procedures    Cardiac Procedures:      Lipid Panel          10/10/2023    08:24   Lipid Panel   Total Cholesterol 112    Triglycerides 96    HDL Cholesterol 37    VLDL Cholesterol 18    LDL Cholesterol  57          ASSESSMENT & PLAN      Diagnosis Plan   1. PVC (premature ventricular " contraction)        2. Persistent atrial fibrillation        3. Coronary artery disease involving native coronary artery of native heart without angina pectoris        4. Essential hypertension              SUMMARY/DISCUSSION  History of coronary artery disease.  Clinically doing well no issues.  Patient was having shortness of breath this has resolved with decreasing his beta-blocker  Hypertension blood pressures.  History of PVCs asymptomatic  Chronic atrial fibrillation.  Patient does have renal disease so he is on the lower dose of the Eliquis.  Continues to do well no issues.  Follow-up 1 year continuoue to provide longitudinal care.        MEDICATIONS         Discharge Medications            Accurate as of July 31, 2024  9:26 AM. If you have any questions, ask your nurse or doctor.                Continue These Medications        Instructions Start Date   apixaban 2.5 MG tablet tablet  Commonly known as: ELIQUIS   2.5 mg, Oral, 2 Times Daily      aspirin 81 MG EC tablet   81 mg, Oral, Daily      atorvastatin 80 MG tablet  Commonly known as: LIPITOR   TAKE 1 TABLET BY MOUTH EVERY DAY      furosemide 40 MG tablet  Commonly known as: LASIX   40 mg, Oral, Daily      isosorbide mononitrate 30 MG 24 hr tablet  Commonly known as: IMDUR   TAKE 1 TABLET BY MOUTH DAILY      losartan 50 MG tablet  Commonly known as: COZAAR   50 mg, Oral, Daily      metoprolol succinate XL 25 MG 24 hr tablet  Commonly known as: TOPROL-XL   25 mg, Oral, Every Evening      nitroglycerin 0.4 MG SL tablet  Commonly known as: NITROSTAT   0.4 mg, Sublingual, As Needed      Omega-3 1000 MG capsule   1,200 mg, Oral, Daily      pantoprazole 40 MG EC tablet  Commonly known as: PROTONIX   40 mg, Oral, Daily      ranolazine 1000 MG 12 hr tablet  Commonly known as: RANEXA   1,000 mg, Oral, Every 12 Hours      Vitamin D3 50 MCG (2000 UT) tablet   2,000 Units, Oral, Daily                   **Dragon Disclaimer:   Much of this encounter note is an  electronic transcription/translation of spoken language to printed text. The electronic translation of spoken language may permit erroneous, or at times, nonsensical words or phrases to be inadvertently transcribed. Although I have reviewed the note for such errors, some may still exist.

## 2024-09-06 ENCOUNTER — TELEPHONE (OUTPATIENT)
Dept: CARDIOLOGY | Facility: CLINIC | Age: 84
End: 2024-09-06
Payer: MEDICARE

## 2024-10-02 ENCOUNTER — TELEPHONE (OUTPATIENT)
Dept: CARDIOLOGY | Facility: CLINIC | Age: 84
End: 2024-10-02
Payer: MEDICARE

## 2024-10-02 NOTE — TELEPHONE ENCOUNTER
Caller Name: Kenn Brito SERENITY      Relationship: Self      Best Contact Number: [phone] DO NOT LEAVE VM        Patient is requesting samples of ELIQUIS 2.5 MG DO YOU HAVE RANEXA 1000 MG?      How many days of medication do you have left? 4 DAYS        Additional Information:PLEASE CALL PT

## 2024-10-25 NOTE — TELEPHONE ENCOUNTER
Patient called for samples.  I called lefty back to inform him I would leave them at the  but he stated he had already came by the office and picked some up./ RAYMOND

## 2024-11-18 ENCOUNTER — TELEPHONE (OUTPATIENT)
Dept: CARDIOLOGY | Facility: CLINIC | Age: 84
End: 2024-11-18

## 2024-11-18 NOTE — TELEPHONE ENCOUNTER
Caller Name: Kenn Brito SERENITY      Relationship: Self      Best Contact Number: [phone]      Patient is requesting samples of ELIQUIS 2.5 MG      How many days of medication do you have left? 2 DAYS        Additional Information:PLEASE CALL PT

## 2025-03-18 RX ORDER — METOPROLOL SUCCINATE 25 MG/1
25 TABLET, EXTENDED RELEASE ORAL EVERY EVENING
Qty: 90 TABLET | Refills: 3 | Status: SHIPPED | OUTPATIENT
Start: 2025-03-18

## 2025-03-31 ENCOUNTER — TRANSCRIBE ORDERS (OUTPATIENT)
Dept: ADMINISTRATIVE | Facility: HOSPITAL | Age: 85
End: 2025-03-31
Payer: MEDICARE

## 2025-03-31 DIAGNOSIS — E83.52 HYPERCALCEMIA: Primary | ICD-10-CM

## 2025-04-03 ENCOUNTER — HOSPITAL ENCOUNTER (OUTPATIENT)
Dept: NUCLEAR MEDICINE | Facility: HOSPITAL | Age: 85
Discharge: HOME | End: 2025-04-03
Payer: MEDICARE

## 2025-04-03 DIAGNOSIS — E83.52 HYPERCALCEMIA: ICD-10-CM

## 2025-04-03 PROCEDURE — A9500 TC99M SESTAMIBI: HCPCS | Performed by: INTERNAL MEDICINE

## 2025-04-03 PROCEDURE — 34310000005 TECHNETIUM SESTAMIBI: Performed by: INTERNAL MEDICINE

## 2025-04-03 PROCEDURE — 78072 PARATHYRD PLANAR W/SPECT&CT: CPT

## 2025-04-03 RX ADMIN — TECHNETIUM TC 99M SESTAMIBI 1 DOSE: 1 INJECTION INTRAVENOUS at 11:11

## 2025-06-30 NOTE — PROGRESS NOTES
RM:________     PCP: Jo Brito MD Last EKG : 2024  : 1940  AGE: 84 y.o.    REASON FOR VISIT: __________________________________________    ____________________________________________________________                                                   Wt Readings from Last 3 Encounters:   24 85.7 kg (189 lb)   24 91.6 kg (202 lb)   24 88.9 kg (195 lb 15.8 oz)      BP Readings from Last 3 Encounters:   24 130/80   24 130/80   24 120/83      WT: ____________ HT: ______ BP: __________ HR ______   02% _______               Lipid: 2021

## 2025-08-01 ENCOUNTER — OFFICE VISIT (OUTPATIENT)
Dept: CARDIOLOGY | Age: 85
End: 2025-08-01
Payer: MEDICARE

## 2025-08-01 VITALS
WEIGHT: 181.4 LBS | SYSTOLIC BLOOD PRESSURE: 112 MMHG | DIASTOLIC BLOOD PRESSURE: 68 MMHG | BODY MASS INDEX: 25.4 KG/M2 | HEIGHT: 71 IN | OXYGEN SATURATION: 99 % | HEART RATE: 88 BPM

## 2025-08-01 DIAGNOSIS — Z95.1 HX OF CABG: ICD-10-CM

## 2025-08-01 DIAGNOSIS — I10 ESSENTIAL HYPERTENSION: ICD-10-CM

## 2025-08-01 DIAGNOSIS — Z01.810 PRE-OPERATIVE CARDIOVASCULAR EXAMINATION: ICD-10-CM

## 2025-08-01 DIAGNOSIS — I25.10 CORONARY ARTERY DISEASE INVOLVING NATIVE CORONARY ARTERY OF NATIVE HEART WITHOUT ANGINA PECTORIS: ICD-10-CM

## 2025-08-01 DIAGNOSIS — I48.19 PERSISTENT ATRIAL FIBRILLATION: Primary | ICD-10-CM

## 2025-08-01 RX ORDER — RANOLAZINE 1000 MG/1
1000 TABLET, EXTENDED RELEASE ORAL EVERY 12 HOURS
Qty: 180 TABLET | Refills: 3 | Status: SHIPPED | OUTPATIENT
Start: 2025-08-01

## 2025-08-01 RX ORDER — METOPROLOL SUCCINATE 25 MG/1
25 TABLET, EXTENDED RELEASE ORAL EVERY EVENING
Qty: 90 TABLET | Refills: 3 | Status: SHIPPED | OUTPATIENT
Start: 2025-08-01

## 2025-08-01 RX ORDER — LANOLIN ALCOHOL/MO/W.PET/CERES
1000 CREAM (GRAM) TOPICAL DAILY
COMMUNITY

## 2025-08-01 RX ORDER — ROSUVASTATIN CALCIUM 10 MG/1
10 TABLET, COATED ORAL DAILY
COMMUNITY
Start: 2025-06-16

## 2025-08-01 NOTE — ASSESSMENT & PLAN NOTE
We can told in clinic  Patient compliant with CPAP  Continue metoprolol succinate 25 mg/day  Anticoagulated with apixaban 2.5 mg/day (age and renal function)

## 2025-08-01 NOTE — ASSESSMENT & PLAN NOTE
Patient need of a parathyroidectomy  Patient is of low risk of major cardiovascular episode in the perioperative setting.  No further cardiac testing is warranted.  Hold apixaban 2 days prior to procedure.  Hold aspirin 3-5 days prior to procedure

## 2025-08-01 NOTE — LETTER
Patient Name: Kenn Brito  :1940  Age: 84 y.o.    25    To whom it may concern:    Kenn Brito was referred to my office for cardiovascular risk assessment exam for upcoming parathyroidectomy.    From a cardiovascular standpoint, the patient is at the following risk of major cardiovascular event in the sammy-operative setting:  [x] Low         [] Modifiable  [] Low-Moderate       [x] Non-Modifiable   [] Moderate  [] Moderare - high  [] High    Cardiac Testing:  [] Needs further cardiac testing  [x] Does not need further cardiac testing    Anticoagulant Status:  [] No anticoagulants    [x] The patient is on  [] ASA; hold for ___ days.  [] Coumadin; hold for ___ days.  [] Plavix; hold for ___ days.  [x] Eliquis; hold for 2-3 days.  [] Brilinta; hold for ___ days.  [] Xarelto; hold for ___ days.  [] Effient; hold for ___ days.    [] The patient requires Lovenox bridging, which has been prescribed.     Beta Blockers:  [] The patient will need pre-op beta blockers which will be prescribed by my clinic.    If there are any problems during surgery, please do not hesitate to contact my office.    Thank you for allowing me to participate in this patient's care.    Sincerely,    FALLON Liu

## 2025-08-01 NOTE — PROGRESS NOTES
CARDIOLOGY        Patient Name: Kenn Brito  :1940  Age: 84 y.o.  Primary Cardiologist: Sam Wright MD  Encounter Provider:  FALLON Liu    Date of Service: 25        CHIEF COMPLAINT / REASON FOR OFFICE VISIT     Follow-up (CAD/HLD) and Pre-op Exam      Coronary Artery Disease  Pertinent negatives include no leg swelling or weight gain.   Atrial Fibrillation  Past medical history includes atrial fibrillation and CAD.       Kenn Brito is a 84 y.o. male who presents today for annual follow up.    Pt has a  history significant for CAD with prior CABG, hyperlipidemia, PVC, hypertension, stage III renal disease.    Patient reports that he has done well since his last assessment.  He is tolerating all medications without any adverse effects.  He specifically denies any bleeding while on apixaban.  BP at home has been stable.  He is currently asymptomatic and denies any episodes of chest pain, shortness of breath, palpitations, lightheadedness, swelling or fatigue.  Send need of a parathyroidectomy which is scheduled for 2025, he needs a preoperative cardiovascular risk assessment letter.      HISTORY OF PRESENT ILLNESS       Myocardial perfusion PET stress.  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  Additional medium sized, moderate to severe area of ischemia in the apex.  Impressions consistent with high risk study.    Cardiac catheterization 2021.  Left main 70% origin lesion with dampening of the catheter.  LAD 10-20% proximal disease, normal at midportion and distally there is first diagonal that is occluded.  Ramus is a large vessel with 30-40% disease.  Circumflex a small vessel with 70-80% lesion in the midportion not amenable to PCI.  RCA is dominant vessel 100% occluded.  LIMA widely patent not used his bypass.  SVG to diagonal was occluded.  SVG to OM1 is occluded.  SVG to RCA is widely patent but the distal portion  there is a 70-80% degenerated lesion, native RCA has some luminal irregularities.  Patient treated with PCI and RANCHO.    ZIO monitor 2/24/2021.  Predominant rhythm was sinus.  PACs occurred occasionally.  PVCs occurred frequently with ventricular couplets bigeminy and trigeminy.  There was a 4 beat episode of nonsustained V. tach.  4 pauses with the longest pause lasting 5.3 seconds.    Cardiac catheterization 5/11/2021.  Left main.  It previously placed stent with a 10-20% in-stent waist.  LAD with 20% mid vessel stenosis.  Very small caliber D2 branch with discrete 90% ostial stenosis.  Left circumflex with  mid segment.  Fills via left to left collaterals.  OM1 small caliber with discrete 80% stenosis.  RCA  proximal segment with discrete 95% distal stenosis that is ulcerated with KIERA II flow.  SVG to RCA is normal.  SVG to diagonal and OM are known to be occluded.  LIMA not used.  Successful PCI of the distal RCA through the SVG to RCA graft with RANCHO.    Medical record review reveals that patient was evaluated in the ED 2/13/2024.  Patient was complaining of dyspnea.  Patient was found to be in new onset atrial fibrillation.  Heart rate was controlled.  Patient was placed on metoprolol succinate as well as low-dose apixaban for weights and creatinine level.    Medical record review reveals that patient was evaluated in the emergency department 4/18/2024 with acute chest pain that was described as an ache in the center of the chest.  Denies pain at the time of the visit.  Reported that this pain was different than previous MI.  High-sensitivity troponin 25, proBNP 1000, creatinine 1.66, hemoglobin 12.7.  I personally reviewed ECG tracings and my interpretation is normal sinus rhythm with no ischemic changes.  Patient was discharged with recommendations for close follow-up in our clinic.      The following portions of the patient's history were reviewed and updated as appropriate: allergies, current  "medications, past family history, past medical history, past social history, past surgical history and problem list.      VITAL SIGNS     Visit Vitals  /68 (BP Location: Left arm, Patient Position: Sitting, Cuff Size: Adult)   Pulse 88   Ht 180.3 cm (71\")   Wt 82.3 kg (181 lb 6.4 oz)   SpO2 99%   BMI 25.30 kg/m²           Wt Readings from Last 3 Encounters:   08/01/25 82.3 kg (181 lb 6.4 oz)   07/31/24 85.7 kg (189 lb)   04/25/24 91.6 kg (202 lb)     Body mass index is 25.3 kg/m².      REVIEW OF SYSTEMS   Review of Systems   Constitutional: Negative for chills, fever, weight gain and weight loss.   Cardiovascular:  Negative for leg swelling.   Respiratory:  Negative for cough, snoring and wheezing.    Hematologic/Lymphatic: Negative for bleeding problem. Does not bruise/bleed easily.   Skin:  Negative for color change.   Musculoskeletal:  Negative for falls, joint pain and myalgias.   Gastrointestinal:  Negative for melena.   Genitourinary:  Negative for hematuria.   Neurological:  Negative for excessive daytime sleepiness.   Psychiatric/Behavioral:  Negative for depression. The patient is not nervous/anxious.            PHYSICAL EXAMINATION     Constitutional:       Appearance: Normal appearance. Well-developed.   Eyes:      Conjunctiva/sclera: Conjunctivae normal.   Neck:      Vascular: No carotid bruit.   Pulmonary:      Effort: Pulmonary effort is normal.      Breath sounds: Normal breath sounds.   Cardiovascular:      Irregularly irregular rhythm. Normal S1. Normal S2.       Murmurs: There is no murmur.      No gallop.  No click. No rub.   Edema:     Peripheral edema absent.   Musculoskeletal: Normal range of motion. Skin:     General: Skin is warm and dry.   Neurological:      Mental Status: Alert and oriented to person, place, and time.      GCS: GCS eye subscore is 4. GCS verbal subscore is 5. GCS motor subscore is 6.   Psychiatric:         Speech: Speech normal.         Behavior: Behavior normal.    "      Thought Content: Thought content normal.         Judgment: Judgment normal.           REVIEWED DATA       ECG 12 Lead    Date/Time: 8/1/2025 10:03 AM  Performed by: Yenny Tolliver APRN    Authorized by: Yenny Tolliver APRN  Comparison: compared with previous ECG from 4/18/2024  Rhythm: atrial fibrillation  Ectopy: infrequent PVCs  Rate: normal  BPM: 84  Conduction: right bundle branch block and left anterior fascicular block  QRS axis: left    Clinical impression: abnormal EKG              BUN   Date Value Ref Range Status   04/18/2024 38 (H) 8 - 23 mg/dL Final   12/28/2019 37 (H) 7 - 20 mg/dL Final     Creatinine   Date Value Ref Range Status   04/18/2024 1.66 (H) 0.76 - 1.27 mg/dL Final   12/28/2019 1.5 0.7 - 1.5 mg/dL Final     Potassium   Date Value Ref Range Status   04/18/2024 4.3 3.5 - 5.2 mmol/L Final   12/28/2019 4.4 3.5 - 5.1 mmol/L Final     ALT (SGPT)   Date Value Ref Range Status   04/18/2024 11 1 - 41 U/L Final   10/18/2019 24 13 - 69 U/L Final     AST (SGOT)   Date Value Ref Range Status   04/18/2024 15 1 - 40 U/L Final   10/18/2019 21 15 - 46 U/L Final           ASSESSMENT & PLAN     Diagnoses and all orders for this visit:    1. Persistent atrial fibrillation (Primary)  Assessment & Plan:  We can told in clinic  Patient compliant with CPAP  Continue metoprolol succinate 25 mg/day  Anticoagulated with apixaban 2.5 mg/day (age and renal function)    Orders:  -     apixaban (ELIQUIS) 2.5 MG tablet tablet; Take 1 tablet by mouth 2 (Two) Times a Day.  Dispense: 180 tablet; Refill: 3  -     metoprolol succinate XL (TOPROL-XL) 25 MG 24 hr tablet; Take 1 tablet by mouth Every Evening.  Dispense: 90 tablet; Refill: 3    2. Coronary artery disease involving native coronary artery of native heart without angina pectoris  Assessment & Plan:  Patient denies any angina or dyspnea  Continue the following regimen:  Aspirin  Jardiance  Isosorbide mononitrate  Losartan  Metoprolol  succinate  Ranolazine  Rosuvastatin    Orders:  -     metoprolol succinate XL (TOPROL-XL) 25 MG 24 hr tablet; Take 1 tablet by mouth Every Evening.  Dispense: 90 tablet; Refill: 3  -     ranolazine (RANEXA) 1000 MG 12 hr tablet; Take 1 tablet by mouth Every 12 (Twelve) Hours.  Dispense: 180 tablet; Refill: 3    3. Essential hypertension  Assessment & Plan:  Hypertension is stable and controlled  Continue current treatment regimen.  Dietary sodium restriction.  Regular aerobic exercise.  Ambulatory blood pressure monitoring.  Blood pressure will be reassessed in 1 year.    Orders:  -     metoprolol succinate XL (TOPROL-XL) 25 MG 24 hr tablet; Take 1 tablet by mouth Every Evening.  Dispense: 90 tablet; Refill: 3    4. Hx of CABG    5. Pre-operative cardiovascular examination  Assessment & Plan:  Patient need of a parathyroidectomy  Patient is of low risk of major cardiovascular episode in the perioperative setting.  No further cardiac testing is warranted.  Hold apixaban 2 days prior to procedure.  Hold aspirin 3-5 days prior to procedure      Other orders  -     ECG 12 Lead            Future Appointments         Provider Department Center    8/3/2026 10:40 AM (Arrive by 10:25 AM) Sam Wright MD [facility] CARDIOLOG[facility]                MEDICATIONS         Discharge Medications            Accurate as of August 1, 2025 12:53 PM. If you have any questions, ask your nurse or doctor.                Continue These Medications        Instructions Start Date   apixaban 2.5 MG tablet tablet  Commonly known as: ELIQUIS   2.5 mg, Oral, 2 Times Daily      aspirin 81 MG EC tablet   81 mg, Daily      empagliflozin 10 MG tablet tablet  Commonly known as: JARDIANCE   Take  by mouth.      furosemide 40 MG tablet  Commonly known as: LASIX   40 mg, Oral, Daily      isosorbide mononitrate 30 MG 24 hr tablet  Commonly known as: IMDUR   TAKE 1 TABLET BY MOUTH DAILY      losartan 50 MG tablet  Commonly known as:  COZAAR   50 mg, Oral, Daily      metoprolol succinate XL 25 MG 24 hr tablet  Commonly known as: TOPROL-XL   25 mg, Oral, Every Evening      nitroglycerin 0.4 MG SL tablet  Commonly known as: NITROSTAT   0.4 mg, Sublingual, As Needed      Omega-3 1000 MG capsule   1,200 mg, Daily      pantoprazole 40 MG EC tablet  Commonly known as: PROTONIX   40 mg, Oral, Daily      ranolazine 1000 MG 12 hr tablet  Commonly known as: RANEXA   1,000 mg, Oral, Every 12 Hours      rosuvastatin 10 MG tablet  Commonly known as: CRESTOR   10 mg, Daily      vitamin B-12 1000 MCG tablet  Commonly known as: CYANOCOBALAMIN   1,000 mcg, Daily      Vitamin D3 50 MCG (2000 UT) tablet   2,000 Units, Daily                   **Dragon Disclaimer:   Much of this encounter note is an electronic transcription/translation of spoken language to printed text. The electronic translation of spoken language may permit erroneous, or at times, nonsensical words or phrases to be inadvertently transcribed. Although I have reviewed the note for such errors, some may still exist.

## 2025-08-01 NOTE — ASSESSMENT & PLAN NOTE
Patient denies any angina or dyspnea  Continue the following regimen:  Aspirin  Jardiance  Isosorbide mononitrate  Losartan  Metoprolol succinate  Ranolazine  Rosuvastatin

## (undated) DEVICE — PK CATH CARD 40

## (undated) DEVICE — GW PRESSUREWIRE AERIS W/ AGILE TP 175CM

## (undated) DEVICE — RUNTHROUGH NS EXTRA FLOPPY PTCA GUIDEWIRE: Brand: RUNTHROUGH

## (undated) DEVICE — CATH DIAG IMPULSE FL3.5 5F 100CM

## (undated) DEVICE — GLIDESHEATH BASIC HYDROPHILIC COATED INTRODUCER SHEATH: Brand: GLIDESHEATH

## (undated) DEVICE — 6F .070 MPA 1 100CM: Brand: VISTA BRITE TIP

## (undated) DEVICE — DEV INDEFLATOR P/N 580289

## (undated) DEVICE — GW HITORQUE/BAL MID/WT J W/HCOAT .014 3X190CM

## (undated) DEVICE — CATH DIAG IMPULSE PIG 5F 100CM

## (undated) DEVICE — CORONARY IMAGING CATHETER: Brand: OPTICROSS™ 6 HD

## (undated) DEVICE — CATH DIAG IMPULSE FR4 5F 100CM

## (undated) DEVICE — NC TREK CORONARY DILATATION CATHETER 3.75 MM X 12 MM / RAPID-EXCHANGE: Brand: NC TREK

## (undated) DEVICE — GUIDE CATHETER: Brand: MACH1™

## (undated) DEVICE — TREK CORONARY DILATATION CATHETER 2.50 MM X 15 MM / RAPID-EXCHANGE: Brand: TREK

## (undated) DEVICE — NC TREK CORONARY DILATATION CATHETER 3.5 MM X 12 MM / RAPID-EXCHANGE: Brand: NC TREK

## (undated) DEVICE — CATH DIAG IMPULSE FL4 5F 100CM

## (undated) DEVICE — CATH DIAG IMPULSE AL1 5F 100CM

## (undated) DEVICE — CATH DIAG IMPULSE MPA2 SH 5F 100CM

## (undated) DEVICE — KT MANIFLD CARDIAC

## (undated) DEVICE — GLIDESHEATH SLENDER STAINLESS STEEL KIT: Brand: GLIDESHEATH SLENDER

## (undated) DEVICE — GW EMR FIX EXCHG J STD .035 3MM 260CM

## (undated) DEVICE — BND PRESS RADL COMFRT 14IN STRL

## (undated) DEVICE — CATH DIAG IMPULSE IMT 5F 100CM

## (undated) DEVICE — CATH4F INF PIG 145Â° MOD 110CM: Brand: INFINITI

## (undated) DEVICE — NC TREK CORONARY DILATATION CATHETER 2.75 MM X 15 MM / RAPID-EXCHANGE: Brand: NC TREK

## (undated) DEVICE — SKIN PREP TRAY W/CHG: Brand: MEDLINE INDUSTRIES, INC.

## (undated) DEVICE — 6F .070 AL 1 100CM: Brand: CORDIS

## (undated) DEVICE — CATH DIAG IMPULSE FR5 5F 100CM